# Patient Record
Sex: FEMALE | Race: WHITE | Employment: OTHER | URBAN - METROPOLITAN AREA
[De-identification: names, ages, dates, MRNs, and addresses within clinical notes are randomized per-mention and may not be internally consistent; named-entity substitution may affect disease eponyms.]

---

## 2017-03-01 ENCOUNTER — APPOINTMENT (OUTPATIENT)
Dept: GENERAL RADIOLOGY | Age: 82
DRG: 203 | End: 2017-03-01
Attending: EMERGENCY MEDICINE
Payer: MEDICARE

## 2017-03-01 ENCOUNTER — HOSPITAL ENCOUNTER (INPATIENT)
Age: 82
LOS: 2 days | Discharge: HOME HEALTH | DRG: 203 | End: 2017-03-03
Attending: EMERGENCY MEDICINE | Admitting: INTERNAL MEDICINE
Payer: MEDICARE

## 2017-03-01 DIAGNOSIS — R09.02 HYPOXIA: ICD-10-CM

## 2017-03-01 DIAGNOSIS — J18.9 COMMUNITY ACQUIRED PNEUMONIA: ICD-10-CM

## 2017-03-01 DIAGNOSIS — R06.2 WHEEZING: Primary | ICD-10-CM

## 2017-03-01 DIAGNOSIS — R60.9 EDEMA, UNSPECIFIED TYPE: ICD-10-CM

## 2017-03-01 PROBLEM — J84.112 IDIOPATHIC PULMONARY FIBROSIS (HCC): Status: ACTIVE | Noted: 2017-03-01

## 2017-03-01 PROBLEM — N18.30 CKD (CHRONIC KIDNEY DISEASE) STAGE 3, GFR 30-59 ML/MIN (HCC): Status: ACTIVE | Noted: 2017-03-01

## 2017-03-01 PROBLEM — I10 HTN (HYPERTENSION): Status: ACTIVE | Noted: 2017-03-01

## 2017-03-01 LAB
ANION GAP BLD CALC-SCNC: 10 MMOL/L (ref 5–15)
APTT PPP: 26.9 SEC (ref 22.1–32.5)
BASOPHILS # BLD AUTO: 0 K/UL (ref 0–0.1)
BASOPHILS # BLD: 0 % (ref 0–1)
BNP SERPL-MCNC: 617 PG/ML (ref 0–450)
BUN SERPL-MCNC: 20 MG/DL (ref 6–20)
BUN/CREAT SERPL: 14 (ref 12–20)
CALCIUM SERPL-MCNC: 8.5 MG/DL (ref 8.5–10.1)
CHLORIDE SERPL-SCNC: 99 MMOL/L (ref 97–108)
CK SERPL-CCNC: 41 U/L (ref 26–192)
CO2 SERPL-SCNC: 25 MMOL/L (ref 21–32)
CREAT SERPL-MCNC: 1.41 MG/DL (ref 0.55–1.02)
EOSINOPHIL # BLD: 0.1 K/UL (ref 0–0.4)
EOSINOPHIL NFR BLD: 1 % (ref 0–7)
ERYTHROCYTE [DISTWIDTH] IN BLOOD BY AUTOMATED COUNT: 20.5 % (ref 11.5–14.5)
GLUCOSE SERPL-MCNC: 173 MG/DL (ref 65–100)
HCT VFR BLD AUTO: 33.8 % (ref 35–47)
HGB BLD-MCNC: 10.1 G/DL (ref 11.5–16)
INR PPP: 1.4 (ref 0.9–1.1)
LACTATE SERPL-SCNC: 1.3 MMOL/L (ref 0.4–2)
LYMPHOCYTES # BLD AUTO: 17 % (ref 12–49)
LYMPHOCYTES # BLD: 2.2 K/UL (ref 0.8–3.5)
MCH RBC QN AUTO: 23.9 PG (ref 26–34)
MCHC RBC AUTO-ENTMCNC: 29.9 G/DL (ref 30–36.5)
MCV RBC AUTO: 80.1 FL (ref 80–99)
MONOCYTES # BLD: 0.8 K/UL (ref 0–1)
MONOCYTES NFR BLD AUTO: 6 % (ref 5–13)
NEUTS SEG # BLD: 9.6 K/UL (ref 1.8–8)
NEUTS SEG NFR BLD AUTO: 76 % (ref 32–75)
PLATELET # BLD AUTO: 243 K/UL (ref 150–400)
POTASSIUM SERPL-SCNC: 3.9 MMOL/L (ref 3.5–5.1)
PROTHROMBIN TIME: 14.7 SEC (ref 9–11.1)
RBC # BLD AUTO: 4.22 M/UL (ref 3.8–5.2)
RBC MORPH BLD: ABNORMAL
RBC MORPH BLD: ABNORMAL
SODIUM SERPL-SCNC: 134 MMOL/L (ref 136–145)
THERAPEUTIC RANGE,PTTT: NORMAL SECS (ref 58–77)
TROPONIN I SERPL-MCNC: <0.04 NG/ML
WBC # BLD AUTO: 12.7 K/UL (ref 3.6–11)

## 2017-03-01 PROCEDURE — 74011000250 HC RX REV CODE- 250: Performed by: EMERGENCY MEDICINE

## 2017-03-01 PROCEDURE — 87040 BLOOD CULTURE FOR BACTERIA: CPT | Performed by: EMERGENCY MEDICINE

## 2017-03-01 PROCEDURE — 65270000029 HC RM PRIVATE

## 2017-03-01 PROCEDURE — 93005 ELECTROCARDIOGRAM TRACING: CPT

## 2017-03-01 PROCEDURE — 82550 ASSAY OF CK (CPK): CPT | Performed by: EMERGENCY MEDICINE

## 2017-03-01 PROCEDURE — 96374 THER/PROPH/DIAG INJ IV PUSH: CPT

## 2017-03-01 PROCEDURE — 85730 THROMBOPLASTIN TIME PARTIAL: CPT | Performed by: EMERGENCY MEDICINE

## 2017-03-01 PROCEDURE — 36415 COLL VENOUS BLD VENIPUNCTURE: CPT | Performed by: EMERGENCY MEDICINE

## 2017-03-01 PROCEDURE — 85610 PROTHROMBIN TIME: CPT | Performed by: EMERGENCY MEDICINE

## 2017-03-01 PROCEDURE — 94762 N-INVAS EAR/PLS OXIMTRY CONT: CPT

## 2017-03-01 PROCEDURE — 85025 COMPLETE CBC W/AUTO DIFF WBC: CPT | Performed by: EMERGENCY MEDICINE

## 2017-03-01 PROCEDURE — 80048 BASIC METABOLIC PNL TOTAL CA: CPT | Performed by: EMERGENCY MEDICINE

## 2017-03-01 PROCEDURE — 83605 ASSAY OF LACTIC ACID: CPT | Performed by: EMERGENCY MEDICINE

## 2017-03-01 PROCEDURE — 99285 EMERGENCY DEPT VISIT HI MDM: CPT

## 2017-03-01 PROCEDURE — 77030013140 HC MSK NEB VYRM -A

## 2017-03-01 PROCEDURE — 94640 AIRWAY INHALATION TREATMENT: CPT

## 2017-03-01 PROCEDURE — 84484 ASSAY OF TROPONIN QUANT: CPT | Performed by: EMERGENCY MEDICINE

## 2017-03-01 PROCEDURE — 74011000258 HC RX REV CODE- 258: Performed by: EMERGENCY MEDICINE

## 2017-03-01 PROCEDURE — 74011250636 HC RX REV CODE- 250/636: Performed by: EMERGENCY MEDICINE

## 2017-03-01 PROCEDURE — 96375 TX/PRO/DX INJ NEW DRUG ADDON: CPT

## 2017-03-01 PROCEDURE — 83880 ASSAY OF NATRIURETIC PEPTIDE: CPT | Performed by: EMERGENCY MEDICINE

## 2017-03-01 PROCEDURE — 96361 HYDRATE IV INFUSION ADD-ON: CPT

## 2017-03-01 PROCEDURE — 71010 XR CHEST PORT: CPT

## 2017-03-01 RX ORDER — ONDANSETRON 2 MG/ML
4 INJECTION INTRAMUSCULAR; INTRAVENOUS
Status: COMPLETED | OUTPATIENT
Start: 2017-03-01 | End: 2017-03-01

## 2017-03-01 RX ORDER — SODIUM CHLORIDE 0.9 % (FLUSH) 0.9 %
5-10 SYRINGE (ML) INJECTION EVERY 8 HOURS
Status: DISCONTINUED | OUTPATIENT
Start: 2017-03-01 | End: 2017-03-03 | Stop reason: HOSPADM

## 2017-03-01 RX ORDER — SODIUM CHLORIDE 0.9 % (FLUSH) 0.9 %
5-10 SYRINGE (ML) INJECTION AS NEEDED
Status: DISCONTINUED | OUTPATIENT
Start: 2017-03-01 | End: 2017-03-03 | Stop reason: HOSPADM

## 2017-03-01 RX ORDER — NALOXONE HYDROCHLORIDE 0.4 MG/ML
0.4 INJECTION, SOLUTION INTRAMUSCULAR; INTRAVENOUS; SUBCUTANEOUS AS NEEDED
Status: DISCONTINUED | OUTPATIENT
Start: 2017-03-01 | End: 2017-03-03 | Stop reason: HOSPADM

## 2017-03-01 RX ADMIN — ALBUTEROL SULFATE 1 DOSE: 2.5 SOLUTION RESPIRATORY (INHALATION) at 22:43

## 2017-03-01 RX ADMIN — ONDANSETRON 4 MG: 2 INJECTION INTRAMUSCULAR; INTRAVENOUS at 22:43

## 2017-03-01 RX ADMIN — CEFTRIAXONE SODIUM 1 G: 1 INJECTION, POWDER, FOR SOLUTION INTRAMUSCULAR; INTRAVENOUS at 23:37

## 2017-03-01 RX ADMIN — SODIUM CHLORIDE 1000 ML: 900 INJECTION, SOLUTION INTRAVENOUS at 22:40

## 2017-03-01 NOTE — IP AVS SNAPSHOT
Current Discharge Medication List  
  
Take these medications at their scheduled times Dose & Instructions Dispensing Information Comments Morning Noon Evening Bedtime  
 amLODIPine 5 mg tablet Commonly known as:  Jessika Fraction Your next dose is: Today, Tomorrow Other:  ____________ Dose:  5 mg Take 1 Tab by mouth daily. Quantity:  30 Tab Refills:  0  
     
   
   
   
  
 azithromycin 250 mg tablet Commonly known as:  Concetta Maloney Your next dose is: Today, Tomorrow Other:  ____________ Dose:  500 mg Take 2 Tabs by mouth daily for 3 days. Quantity:  3 Tab Refills:  0  
     
   
   
   
  
 BYSTOLIC 5 mg tablet Generic drug:  nebivolol Your next dose is: Today, Tomorrow Other:  ____________ Dose:  5 mg Take 5 mg by mouth daily. Indications: hypertension Refills:  0  
     
   
   
   
  
 ferrous sulfate 325 mg (65 mg iron) tablet Your next dose is: Today, Tomorrow Other:  ____________ Dose:  325 mg Take 325 mg by mouth Daily (before breakfast). Refills:  0  
     
   
   
   
  
 folic acid 1 mg tablet Commonly known as:  Ann Marie Your next dose is: Today, Tomorrow Other:  ____________ Dose:  1 mg Take 1 Tab by mouth daily. Quantity:  30 Tab Refills:  0  
     
   
   
   
  
 guaiFENesin  mg ER tablet Commonly known as:  Robert & Robert Your next dose is: Today, Tomorrow Other:  ____________ Dose:  600 mg Take 1 Tab by mouth every twelve (12) hours for 5 days. Can use over the counter forms Quantity:  10 Tab Refills:  0  
     
   
   
   
  
 predniSONE 5 mg tablet Commonly known as:  Pee Wallace Your next dose is: Today, Tomorrow Other:  ____________ Dose:  5 mg Take 5 mg by mouth daily. Refills:  0 PREMARIN 0.625 mg tablet Generic drug:  conjugated estrogens Your next dose is: Today, Tomorrow Other:  ____________ Dose:  0.625 mg Take 0.625 mg by mouth daily. Refills:  0 PROTONIX 40 mg tablet Generic drug:  pantoprazole Your next dose is: Today, Tomorrow Other:  ____________ Dose:  40 mg Take 40 mg by mouth daily. Refills:  0 REMICADE 100 mg injection Generic drug:  inFLIXimab Your next dose is: Today, Tomorrow Other:  ____________ Dose:  5 mg/kg 5 mg/kg by IntraVENous route once. Every 6 weeks Refills:  0  
     
   
   
   
  
 warfarin 2.5 mg tablet Commonly known as:  COUMADIN Your next dose is: Today, Tomorrow Other:  ____________ Dose:  5 mg Take 5 mg by mouth daily. Refills:  0 Take these medications as needed Dose & Instructions Dispensing Information Comments Morning Noon Evening Bedtime  
 albuterol-ipratropium 2.5 mg-0.5 mg/3 ml Nebu Commonly known as:  [place] Your next dose is: Today, Tomorrow Other:  ____________ Dose:  3 mL  
3 mL by Nebulization route every six (6) hours as needed. Quantity:  30 Nebule Refills:  0 Nebulizer & Compressor machine Your next dose is: Today, Tomorrow Other:  ____________ Dose:  1 Each  
1 Each by Does Not Apply route three (3) times daily as needed. Quantity:  1 Each Refills:  0 Where to Get Your Medications Information about where to get these medications is not yet available ! Ask your nurse or doctor about these medications  
  albuterol-ipratropium 2.5 mg-0.5 mg/3 ml Nebu  
 azithromycin 250 mg tablet  
 guaiFENesin  mg ER tablet Nebulizer & Compressor machine

## 2017-03-01 NOTE — IP AVS SNAPSHOT
[address] [place] [address] 
[phone] Patient: Obi Craft MRN: [id number] NSK:[id number] You are allergic to the following Allergen Reactions Ciprofloxacin Other (comments) \"Makes her act weird\" per son Recent Documentation Height  
  
  
  
  
  
 1.676 m Unresulted Labs Order Current Status LEGIONELLA PNEUMOPHILA AG, URINE  In process MYCOPLASMA AB, IGG/IGM In process S. PNEUMO AG, URINE/CSF ONLY In process CULTURE, BLOOD, PAIRED Preliminary result Emergency Contacts Name Discharge Info Relation Home Work Mobile Fabián Coburn  Child [2]   [phone] About your hospitalization You were admitted on:  March 1, 2017 You last received care in the:  [facility] 4M POST SURG ORT 1 You were discharged on:  March 3, 2017 Unit phone number:  [phone] Why you were hospitalized Your primary diagnosis was:  Bronchitis Your diagnoses also included:  Cap (Community Acquired Pneumonia), Idiopathic Pulmonary Fibrosis (Hcc), Anemia, Unspecified, Esophageal Reflux, Leukocytosis, Pulmonary Embolism (Hcc), Hx Of Deep Venous Thrombosis, Anemia, Rheumatoid Arthritis (Hcc), Htn (Hypertension), Ckd (Chronic Kidney Disease) Stage 3, Gfr 30-59 Ml/Min, Ipf (Idiopathic Pulmonary Fibrosis) (Hcc), Hypertension, Acid Reflux, Arthritis, Hx Pulmonary Embolism, Acute On Chronic Respiratory Failure With Hypoxia (Hcc) Providers Seen During Your Hospitalizations Provider Role Specialty Primary office phone Eh Miller MD Attending Provider Emergency Medicine [phone] Tianna Boss MD Attending Provider Internal Medicine [phone] Lizz Maria MD Attending Provider Internal Medicine [phone] Jon Doan DO Attending Provider Internal Medicine [phone] Your Primary Care Physician (PCP) Primary Care Physician Office Phone Office Fax Cesar Nieto [phone] Follow-up Information Follow up With Details Comments Contact Info Laurel Wilkinson MD   [address] [place] [address] [address] [facility] 
[phone] Johana Shea MD Schedule an appointment as soon as possible for a visit  [address] [facility] [address] [address] 
[phone] Current Discharge Medication List  
  
START taking these medications Dose & Instructions Dispensing Information Comments Morning Noon Evening Bedtime  
 albuterol-ipratropium 2.5 mg-0.5 mg/3 ml Nebu Commonly known as:  Meron Yañez Your next dose is: Today, Tomorrow Other:  _________ Dose:  3 mL  
3 mL by Nebulization route every six (6) hours as needed. Quantity:  30 Nebule Refills:  0  
     
   
   
   
  
 azithromycin 250 mg tablet Commonly known as:  Kandice Koenig Your next dose is: Today, Tomorrow Other:  _________ Dose:  500 mg Take 2 Tabs by mouth daily for 3 days. Quantity:  3 Tab Refills:  0  
     
   
   
   
  
 guaiFENesin  mg ER tablet Commonly known as:  Robert & Robert Your next dose is: Today, Tomorrow Other:  _________ Dose:  600 mg Take 1 Tab by mouth every twelve (12) hours for 5 days. Can use over the counter forms Quantity:  10 Tab Refills:  0 Nebulizer & Compressor machine Your next dose is: Today, Tomorrow Other:  _________ Dose:  1 Each  
1 Each by Does Not Apply route three (3) times daily as needed. Quantity:  1 Each Refills:  0 CONTINUE these medications which have NOT CHANGED Dose & Instructions Dispensing Information Comments Morning Noon Evening Bedtime  
 amLODIPine 5 mg tablet Commonly known as:  Charles Morgan Your next dose is: Today, Tomorrow Other:  _________ Dose:  5 mg Take 1 Tab by mouth daily. Quantity:  30 Tab Refills:  0  
     
   
   
   
  
 BYSTOLIC 5 mg tablet Generic drug:  nebivolol Your next dose is: Today, Tomorrow Other:  _________ Dose:  5 mg Take 5 mg by mouth daily. Indications: hypertension Refills:  0  
     
   
   
   
  
 ferrous sulfate 325 mg (65 mg iron) tablet Your next dose is: Today, Tomorrow Other:  _________ Dose:  325 mg Take 325 mg by mouth Daily (before breakfast). Refills:  0  
     
   
   
   
  
 folic acid 1 mg tablet Commonly known as:  [employer] Your next dose is: Today, Tomorrow Other:  _________ Dose:  1 mg Take 1 Tab by mouth daily. Quantity:  30 Tab Refills:  0  
     
   
   
   
  
 predniSONE 5 mg tablet Commonly known as:  Rach Brink Your next dose is: Today, Tomorrow Other:  _________ Dose:  5 mg Take 5 mg by mouth daily. Refills:  0 PREMARIN 0.625 mg tablet Generic drug:  conjugated estrogens Your next dose is: Today, Tomorrow Other:  _________ Dose:  0.625 mg Take 0.625 mg by mouth daily. Refills:  0 PROTONIX 40 mg tablet Generic drug:  pantoprazole Your next dose is: Today, Tomorrow Other:  _________ Dose:  40 mg Take 40 mg by mouth daily. Refills:  0 REMICADE 100 mg injection Generic drug:  inFLIXimab Your next dose is: Today, Tomorrow Other:  _________ Dose:  5 mg/kg 5 mg/kg by IntraVENous route once. Every 6 weeks Refills:  0  
     
   
   
   
  
 warfarin 2.5 mg tablet Commonly known as:  COUMADIN Your next dose is: Today, Tomorrow Other:  _________ Dose:  5 mg Take 5 mg by mouth daily. Refills:  0 STOP taking these medications   
 methotrexate 2.5 mg tablet Commonly known as:  Harley Praelizabeth Where to Get Your Medications Information on where to get these meds will be given to you by the nurse or doctor. ! Ask your nurse or doctor about these medications  
  albuterol-ipratropium 2.5 mg-0.5 mg/3 ml Nebu  
 azithromycin 250 mg tablet  
 guaiFENesin  mg ER tablet Nebulizer & Compressor machine Discharge Instructions Patient Discharge Instructions Freddy Wright / [id number] : 1933 Admitted 3/1/2017 Discharged: 3/2/2017 Primary Diagnoses Problem List as of 3/2/2017  Date Reviewed: 3/1/2017 Codes Class Noted - Resolved IPF (idiopathic pulmonary fibrosis) ([facility] [address] Hypertension Acid reflux Arthritis Hx pulmonary embolism * (Principal)Bronchitis CKD (chronic kidney disease) stage 3, GFR 30-59 ml/min Hx of deep venous thrombosis Rheumatoid arthritis ([facility] [address] Leukocytosis Acute on chronic respiratory failure with hypoxia (HCC) Esophageal reflux Anemia, unspecified Take Home Medications · It is important that you take the medication exactly as they are prescribed. · Keep your medication in the bottles provided by the pharmacist and keep a list of the medication names, dosages, and times to be taken in your wallet. · Do not take other medications without consulting your doctor. What to do at [facility] Recommended diet: Regular Diet Recommended activity: Activity as tolerated and PT/OT per Home Health If you experience worse breathing, please follow up with a pulmonologist. 
 
Follow-up with your PCP in a few weeks Interstitial Lung Disease: Care Instructions Your Care Instructions Interstitial lung disease is a long-term (chronic) lung disease. It happens because of damage between the air sacs in the lung. The damage scars the lung and causes breathing problems.  
People with interstitial lung disease get breathless during exercise and may have a dry cough. These problems may get worse slowly or very quickly. Interstitial lung disease can be caused by breathing in dust from asbestos and silica. It also can be caused by infections and some medicines. Sometimes doctors cannot find the cause. You may get medicine to treat the problem. Corticosteroids can sometimes reduce the swelling of lung tissue and prevent more damage. Oxygen treatment may help your condition. Follow-up care is a key part of your treatment and safety. Be sure to make and go to all appointments, and call your doctor if you are having problems. Its also a good idea to know your test results and keep a list of the medicines you take. How can you care for yourself at home? · Do not smoke. Smoking makes interstitial lung disease worse. If you need help quitting, talk to your doctor about stop-smoking programs and medicines. These can increase your chances of quitting for good. · Take your medicines exactly as prescribed. Call your doctor if you have any problems with your medicine. · Get flu and pneumococcal shots. These help prevent lung infection. · Make an exercise plan with help from your doctor or other health professional. Exercise can help you breathe more easily. · Think about joining a support group. This can help you cope with problems caused by interstitial lung disease. When should you call for help? Call your doctor now or seek immediate medical care if: 
· Your shortness of breath gets worse. · You cough up blood. · You have severe chest pain. Watch closely for changes in your health, and be sure to contact your doctor if you have any problems. Where can you learn more? Go to http://luis e-bridget.info/. Enter L709 in the search box to learn more about \"Interstitial Lung Disease: Care Instructions. \" Current as of: May 23, 2016 Content Version: 11.1 © 5207-5780 [employer].  Care instructions adapted under license by [address] (which disclaims liability or warranty for this information). If you have questions about a medical condition or this instruction, always ask your healthcare professional. [address] any warranty or liability for your use of this information. Information obtained by : 
I understand that if any problems occur once I am at home I am to contact my physician. I understand and acknowledge receipt of the instructions indicated above. Physician's or R.N.'s Signature                                                                  Date/Time Patient or Representative Signature                                                          Date/Time Discharge Orders None Swing [employer] Announcement We are excited to announce that we are making your provider's discharge notes available to you in Swing [employer] Swing. You will see these notes when they are completed and signed by the physician that discharged you from your recent hospital stay. If you have any questions or concerns about any information you see in Swing [employer] Swing, please call the Health Information Department where you were seen or reach out to your Primary Care Provider for more information about your plan of care. Introducing [facility]! [facility] introduces Swing [employer] Swing patient portal. Now you can access parts of your medical record, email your doctor's office, and request medication refills online. 1. In your internet browser, go to [email or web address] 2. Click on the First Time User? Click Here link in the Sign In box. You will see the New Member Sign Up page. 3. Enter your Let Access Code exactly as it appears below. You will not need to use this code after youve completed the sign-up process. If you do not sign up before the expiration date, you must request a new code. · Let Access Code: [facility] Expires: 5/30/2017 11:10 PM 
 
4. Enter the last four digits of your Social Security Number (xxxx) and Date of Birth (mm/dd/yyyy) as indicated and click Submit. You will be taken to the next sign-up page. 5. Create a Let ID. This will be your Let login ID and cannot be changed, so think of one that is secure and easy to remember. 6. Create a Let password. You can change your password at any time. 7. Enter your Password Reset Question and Answer. This can be used at a later time if you forget your password. 8. Enter your e-mail address. You will receive e-mail notification when new information is available in [address] 9. Click Sign Up. You can now view and download portions of your medical record. 10. Click the Download Summary menu link to download a portable copy of your medical information. If you have questions, please visit the Frequently Asked Questions section of the Let website. Remember, Let is NOT to be used for urgent needs. For medical emergencies, dial 911. Now available from your iPhone and Android! General Information Please provide this summary of care documentation to your next provider. Patient Signature:  ____________________________________________________________ Date:  ____________________________________________________________  
  
Saleem Rodrigues Provider Signature:  ____________________________________________________________ Date:  ____________________________________________________________

## 2017-03-02 ENCOUNTER — APPOINTMENT (OUTPATIENT)
Dept: CT IMAGING | Age: 82
DRG: 203 | End: 2017-03-02
Attending: INTERNAL MEDICINE
Payer: MEDICARE

## 2017-03-02 PROBLEM — I10 HTN (HYPERTENSION): Status: RESOLVED | Noted: 2017-03-01 | Resolved: 2017-03-02

## 2017-03-02 PROBLEM — J84.112 IDIOPATHIC PULMONARY FIBROSIS (HCC): Status: RESOLVED | Noted: 2017-03-01 | Resolved: 2017-03-02

## 2017-03-02 PROBLEM — J18.9 CAP (COMMUNITY ACQUIRED PNEUMONIA): Status: RESOLVED | Noted: 2017-03-01 | Resolved: 2017-03-02

## 2017-03-02 PROBLEM — J40 BRONCHITIS: Status: ACTIVE | Noted: 2017-03-02

## 2017-03-02 LAB
ALBUMIN SERPL BCP-MCNC: 2.9 G/DL (ref 3.5–5)
ALBUMIN/GLOB SERPL: 0.8 {RATIO} (ref 1.1–2.2)
ALP SERPL-CCNC: 40 U/L (ref 45–117)
ALT SERPL-CCNC: 16 U/L (ref 12–78)
ANION GAP BLD CALC-SCNC: 10 MMOL/L (ref 5–15)
APPEARANCE UR: CLEAR
AST SERPL W P-5'-P-CCNC: 13 U/L (ref 15–37)
ATRIAL RATE: 102 BPM
BACTERIA URNS QL MICRO: NEGATIVE /HPF
BASOPHILS # BLD AUTO: 0 K/UL (ref 0–0.1)
BASOPHILS # BLD: 0 % (ref 0–1)
BILIRUB SERPL-MCNC: 0.4 MG/DL (ref 0.2–1)
BILIRUB UR QL: NEGATIVE
BUN SERPL-MCNC: 16 MG/DL (ref 6–20)
BUN/CREAT SERPL: 12 (ref 12–20)
CALCIUM SERPL-MCNC: 7.9 MG/DL (ref 8.5–10.1)
CALCULATED R AXIS, ECG10: -10 DEGREES
CALCULATED T AXIS, ECG11: 35 DEGREES
CHLORIDE SERPL-SCNC: 104 MMOL/L (ref 97–108)
CO2 SERPL-SCNC: 26 MMOL/L (ref 21–32)
COLOR UR: ABNORMAL
CREAT SERPL-MCNC: 1.3 MG/DL (ref 0.55–1.02)
DIAGNOSIS, 93000: NORMAL
EOSINOPHIL # BLD: 0 K/UL (ref 0–0.4)
EOSINOPHIL NFR BLD: 0 % (ref 0–7)
EPITH CASTS URNS QL MICRO: ABNORMAL /LPF
ERYTHROCYTE [DISTWIDTH] IN BLOOD BY AUTOMATED COUNT: 20.8 % (ref 11.5–14.5)
FLUAV AG NPH QL IA: NEGATIVE
FLUBV AG NOSE QL IA: NEGATIVE
GLOBULIN SER CALC-MCNC: 3.7 G/DL (ref 2–4)
GLUCOSE SERPL-MCNC: 115 MG/DL (ref 65–100)
GLUCOSE UR STRIP.AUTO-MCNC: NEGATIVE MG/DL
HCT VFR BLD AUTO: 30.4 % (ref 35–47)
HGB BLD-MCNC: 8.8 G/DL (ref 11.5–16)
HGB UR QL STRIP: ABNORMAL
HYALINE CASTS URNS QL MICRO: ABNORMAL /LPF (ref 0–5)
INR PPP: 1.4 (ref 0.9–1.1)
KETONES UR QL STRIP.AUTO: NEGATIVE MG/DL
LEUKOCYTE ESTERASE UR QL STRIP.AUTO: NEGATIVE
LYMPHOCYTES # BLD AUTO: 16 % (ref 12–49)
LYMPHOCYTES # BLD: 1.4 K/UL (ref 0.8–3.5)
MAGNESIUM SERPL-MCNC: 1.9 MG/DL (ref 1.6–2.4)
MCH RBC QN AUTO: 23.2 PG (ref 26–34)
MCHC RBC AUTO-ENTMCNC: 28.9 G/DL (ref 30–36.5)
MCV RBC AUTO: 80.2 FL (ref 80–99)
MONOCYTES # BLD: 1 K/UL (ref 0–1)
MONOCYTES NFR BLD AUTO: 11 % (ref 5–13)
NEUTS SEG # BLD: 6.6 K/UL (ref 1.8–8)
NEUTS SEG NFR BLD AUTO: 73 % (ref 32–75)
NITRITE UR QL STRIP.AUTO: NEGATIVE
PH UR STRIP: 5.5 [PH] (ref 5–8)
PLATELET # BLD AUTO: 215 K/UL (ref 150–400)
POTASSIUM SERPL-SCNC: 3.8 MMOL/L (ref 3.5–5.1)
PROT SERPL-MCNC: 6.6 G/DL (ref 6.4–8.2)
PROT UR STRIP-MCNC: NEGATIVE MG/DL
PROTHROMBIN TIME: 14.2 SEC (ref 9–11.1)
Q-T INTERVAL, ECG07: 358 MS
QRS DURATION, ECG06: 76 MS
QTC CALCULATION (BEZET), ECG08: 457 MS
RBC # BLD AUTO: 3.79 M/UL (ref 3.8–5.2)
RBC #/AREA URNS HPF: ABNORMAL /HPF (ref 0–5)
SODIUM SERPL-SCNC: 140 MMOL/L (ref 136–145)
SP GR UR REFRACTOMETRY: 1.01 (ref 1–1.03)
UA: UC IF INDICATED,UAUC: ABNORMAL
UROBILINOGEN UR QL STRIP.AUTO: 0.2 EU/DL (ref 0.2–1)
VENTRICULAR RATE, ECG03: 98 BPM
WBC # BLD AUTO: 9 K/UL (ref 3.6–11)
WBC URNS QL MICRO: ABNORMAL /HPF (ref 0–4)

## 2017-03-02 PROCEDURE — 87899 AGENT NOS ASSAY W/OPTIC: CPT | Performed by: INTERNAL MEDICINE

## 2017-03-02 PROCEDURE — 74011636637 HC RX REV CODE- 636/637: Performed by: INTERNAL MEDICINE

## 2017-03-02 PROCEDURE — 77030011256 HC DRSG MEPILEX <16IN NO BORD MOLN -A

## 2017-03-02 PROCEDURE — 81001 URINALYSIS AUTO W/SCOPE: CPT | Performed by: INTERNAL MEDICINE

## 2017-03-02 PROCEDURE — 87449 NOS EACH ORGANISM AG IA: CPT | Performed by: INTERNAL MEDICINE

## 2017-03-02 PROCEDURE — 87804 INFLUENZA ASSAY W/OPTIC: CPT | Performed by: INTERNAL MEDICINE

## 2017-03-02 PROCEDURE — 94640 AIRWAY INHALATION TREATMENT: CPT

## 2017-03-02 PROCEDURE — 77010033678 HC OXYGEN DAILY

## 2017-03-02 PROCEDURE — 74011250636 HC RX REV CODE- 250/636: Performed by: EMERGENCY MEDICINE

## 2017-03-02 PROCEDURE — 85025 COMPLETE CBC W/AUTO DIFF WBC: CPT | Performed by: INTERNAL MEDICINE

## 2017-03-02 PROCEDURE — 74011000250 HC RX REV CODE- 250: Performed by: INTERNAL MEDICINE

## 2017-03-02 PROCEDURE — 80053 COMPREHEN METABOLIC PANEL: CPT | Performed by: INTERNAL MEDICINE

## 2017-03-02 PROCEDURE — 36415 COLL VENOUS BLD VENIPUNCTURE: CPT | Performed by: INTERNAL MEDICINE

## 2017-03-02 PROCEDURE — 86738 MYCOPLASMA ANTIBODY: CPT | Performed by: INTERNAL MEDICINE

## 2017-03-02 PROCEDURE — 97162 PT EVAL MOD COMPLEX 30 MIN: CPT

## 2017-03-02 PROCEDURE — 83735 ASSAY OF MAGNESIUM: CPT | Performed by: INTERNAL MEDICINE

## 2017-03-02 PROCEDURE — 65270000029 HC RM PRIVATE

## 2017-03-02 PROCEDURE — 85610 PROTHROMBIN TIME: CPT | Performed by: INTERNAL MEDICINE

## 2017-03-02 PROCEDURE — 74011250637 HC RX REV CODE- 250/637: Performed by: INTERNAL MEDICINE

## 2017-03-02 PROCEDURE — 77030012856

## 2017-03-02 PROCEDURE — 71250 CT THORAX DX C-: CPT

## 2017-03-02 PROCEDURE — 97530 THERAPEUTIC ACTIVITIES: CPT

## 2017-03-02 PROCEDURE — 97116 GAIT TRAINING THERAPY: CPT

## 2017-03-02 PROCEDURE — 77030013140 HC MSK NEB VYRM -A

## 2017-03-02 RX ORDER — NEBULIZER AND COMPRESSOR
1 EACH MISCELLANEOUS
Qty: 1 EACH | Refills: 0 | Status: ON HOLD | OUTPATIENT
Start: 2017-03-02 | End: 2017-03-08

## 2017-03-02 RX ORDER — IPRATROPIUM BROMIDE AND ALBUTEROL SULFATE 2.5; .5 MG/3ML; MG/3ML
3 SOLUTION RESPIRATORY (INHALATION)
Status: DISCONTINUED | OUTPATIENT
Start: 2017-03-02 | End: 2017-03-03 | Stop reason: HOSPADM

## 2017-03-02 RX ORDER — TOLTERODINE 2 MG/1
4 CAPSULE, EXTENDED RELEASE ORAL DAILY
Status: DISCONTINUED | OUTPATIENT
Start: 2017-03-02 | End: 2017-03-02

## 2017-03-02 RX ORDER — LANOLIN ALCOHOL/MO/W.PET/CERES
325 CREAM (GRAM) TOPICAL
Status: DISCONTINUED | OUTPATIENT
Start: 2017-03-02 | End: 2017-03-03 | Stop reason: HOSPADM

## 2017-03-02 RX ORDER — AZITHROMYCIN 250 MG/1
500 TABLET, FILM COATED ORAL DAILY
Qty: 3 TAB | Refills: 0 | Status: SHIPPED | OUTPATIENT
Start: 2017-03-02 | End: 2017-03-05

## 2017-03-02 RX ORDER — NEBIVOLOL 5 MG/1
5 TABLET ORAL DAILY
Status: DISCONTINUED | OUTPATIENT
Start: 2017-03-02 | End: 2017-03-03 | Stop reason: HOSPADM

## 2017-03-02 RX ORDER — GUAIFENESIN 600 MG/1
600 TABLET, EXTENDED RELEASE ORAL EVERY 12 HOURS
Status: DISCONTINUED | OUTPATIENT
Start: 2017-03-02 | End: 2017-03-03 | Stop reason: HOSPADM

## 2017-03-02 RX ORDER — GUAIFENESIN 600 MG/1
600 TABLET, EXTENDED RELEASE ORAL EVERY 12 HOURS
Qty: 10 TAB | Refills: 0 | Status: ON HOLD | OUTPATIENT
Start: 2017-03-02 | End: 2017-03-08

## 2017-03-02 RX ORDER — WARFARIN SODIUM 5 MG/1
5 TABLET ORAL EVERY EVENING
Status: COMPLETED | OUTPATIENT
Start: 2017-03-02 | End: 2017-03-02

## 2017-03-02 RX ORDER — IPRATROPIUM BROMIDE AND ALBUTEROL SULFATE 2.5; .5 MG/3ML; MG/3ML
3 SOLUTION RESPIRATORY (INHALATION)
Qty: 30 NEBULE | Refills: 0 | Status: SHIPPED | OUTPATIENT
Start: 2017-03-02

## 2017-03-02 RX ORDER — AZITHROMYCIN 250 MG/1
500 TABLET, FILM COATED ORAL DAILY
Status: DISCONTINUED | OUTPATIENT
Start: 2017-03-02 | End: 2017-03-03 | Stop reason: HOSPADM

## 2017-03-02 RX ORDER — FOLIC ACID 1 MG/1
1 TABLET ORAL DAILY
Status: DISCONTINUED | OUTPATIENT
Start: 2017-03-02 | End: 2017-03-03 | Stop reason: HOSPADM

## 2017-03-02 RX ORDER — AMLODIPINE BESYLATE 5 MG/1
5 TABLET ORAL DAILY
Status: DISCONTINUED | OUTPATIENT
Start: 2017-03-02 | End: 2017-03-03 | Stop reason: HOSPADM

## 2017-03-02 RX ORDER — METHOTREXATE 2.5 MG/1
17.5 TABLET ORAL
COMMUNITY
End: 2017-03-03

## 2017-03-02 RX ORDER — NEBIVOLOL 5 MG/1
10 TABLET ORAL DAILY
Status: DISCONTINUED | OUTPATIENT
Start: 2017-03-02 | End: 2017-03-02

## 2017-03-02 RX ORDER — WARFARIN 2.5 MG/1
5 TABLET ORAL
COMMUNITY

## 2017-03-02 RX ORDER — ALBUTEROL SULFATE 0.83 MG/ML
2.5 SOLUTION RESPIRATORY (INHALATION)
Status: DISCONTINUED | OUTPATIENT
Start: 2017-03-02 | End: 2017-03-03 | Stop reason: HOSPADM

## 2017-03-02 RX ORDER — NEBIVOLOL 5 MG/1
5 TABLET ORAL DAILY
Status: DISCONTINUED | OUTPATIENT
Start: 2017-03-03 | End: 2017-03-02

## 2017-03-02 RX ORDER — PANTOPRAZOLE SODIUM 40 MG/1
40 TABLET, DELAYED RELEASE ORAL
Status: DISCONTINUED | OUTPATIENT
Start: 2017-03-02 | End: 2017-03-03 | Stop reason: HOSPADM

## 2017-03-02 RX ORDER — PREDNISONE 5 MG/1
5 TABLET ORAL DAILY
Status: DISCONTINUED | OUTPATIENT
Start: 2017-03-02 | End: 2017-03-03 | Stop reason: HOSPADM

## 2017-03-02 RX ADMIN — GUAIFENESIN 600 MG: 600 TABLET, EXTENDED RELEASE ORAL at 09:45

## 2017-03-02 RX ADMIN — AMLODIPINE BESYLATE 5 MG: 5 TABLET ORAL at 09:45

## 2017-03-02 RX ADMIN — AZITHROMYCIN 500 MG: 250 TABLET, FILM COATED ORAL at 22:08

## 2017-03-02 RX ADMIN — IPRATROPIUM BROMIDE AND ALBUTEROL SULFATE 3 ML: .5; 2.5 SOLUTION RESPIRATORY (INHALATION) at 13:32

## 2017-03-02 RX ADMIN — GUAIFENESIN 600 MG: 600 TABLET, EXTENDED RELEASE ORAL at 22:07

## 2017-03-02 RX ADMIN — WARFARIN SODIUM 5 MG: 5 TABLET ORAL at 17:10

## 2017-03-02 RX ADMIN — PREDNISONE 5 MG: 5 TABLET ORAL at 09:45

## 2017-03-02 RX ADMIN — Medication 10 ML: at 06:00

## 2017-03-02 RX ADMIN — IPRATROPIUM BROMIDE AND ALBUTEROL SULFATE 3 ML: .5; 2.5 SOLUTION RESPIRATORY (INHALATION) at 20:22

## 2017-03-02 RX ADMIN — IRON 325 MG: 65 TABLET ORAL at 09:45

## 2017-03-02 RX ADMIN — Medication 10 ML: at 22:00

## 2017-03-02 RX ADMIN — PANTOPRAZOLE SODIUM 40 MG: 40 TABLET, DELAYED RELEASE ORAL at 09:45

## 2017-03-02 RX ADMIN — NEBIVOLOL HYDROCHLORIDE 5 MG: 5 TABLET ORAL at 09:45

## 2017-03-02 RX ADMIN — AZITHROMYCIN MONOHYDRATE 500 MG: 500 INJECTION, POWDER, LYOPHILIZED, FOR SOLUTION INTRAVENOUS at 00:11

## 2017-03-02 RX ADMIN — IPRATROPIUM BROMIDE AND ALBUTEROL SULFATE 3 ML: .5; 2.5 SOLUTION RESPIRATORY (INHALATION) at 07:04

## 2017-03-02 RX ADMIN — IPRATROPIUM BROMIDE AND ALBUTEROL SULFATE 3 ML: .5; 2.5 SOLUTION RESPIRATORY (INHALATION) at 02:15

## 2017-03-02 RX ADMIN — Medication 10 ML: at 17:09

## 2017-03-02 RX ADMIN — GUAIFENESIN 600 MG: 600 TABLET, EXTENDED RELEASE ORAL at 00:44

## 2017-03-02 RX ADMIN — FOLIC ACID 1 MG: 1 TABLET ORAL at 09:45

## 2017-03-02 NOTE — CDMP QUERY
Dr. Milana Amaya:    The diagnosis of acute respiratory failure has been documented for your patient. Currently, the documentation does not meet criteria for this diagnosis, and may be challenged by an external reviewer. Please remember to include the clinical indicators to support this diagnosis. Current Documentation: RA sats 91-95%; O2 2L NC, sats 96-98%. RR 18-24. ER MD notes:  \"Effort normal and breath sounds normal. No stridor. No respiratory distress. \"  H/P notes \"No accessory muscle use but with increased WOB. \"  3/2 exam notes \"no acute distress. Krishnan Scott Krishnan Scott No accessory muscle use, clear breath sounds without wheezes rales or rhonchi\"    Criteria:    1. Difficulty breathing: RR >/= 28, air hunger, use of accessory muscles, inability to speak in full sentences, cyanosis    AND    2.  Blood gas abnormality   pH <7.35 or >7.45   pO2 < 60 (or 10mm below COPD pt's baseline)   pCO2 > 50 (or 10mm above COPD pt's baseline)      or       SpO2 < 90%  while on room air   SpO2 < 95% while on supplemental [id number] [facility], [address] [place] [address]  [email or web address]

## 2017-03-02 NOTE — PROGRESS NOTES
[facility]  [address] [facility], [address] [place] [address]  [phone]      Medical Progress Note      NAME: Marlene Tarango   :  1933  MRM:  [id number]    Date/Time: 3/2/2017  9:18 AM       Assessment and Plan:     Bronchitis / Leukocytosis - POA, appears to be better this AM.  Likely viral but complete Azithro course. NOT sepsis. Consult pulmonary to see if any further inpatient workup needed. Getting guaifenesin and duonebs. Acute respiratory failure with hypoxia - Uncertain diagnosis yet. Check 6 minute walk for hypoxia. Unclear if chronic, with fibrosis. Wean oxygen if tolerated. IPF (idiopathic pulmonary fibrosis) - Noted on CT chest.  Patient states she has never been told of this Dx and does not have a pulmonologist.  Could this be rheumatoid? MTX damage? Consult pulmonology. Anemia, unspecified - Worse this AM after hydration. Continue PO iron. Check serologies. Dehydration / CKD (chronic kidney disease) stage 3 - POA, a bit better after hydration. Hx pulmonary embolism / Hx of deep venous thrombosis - On coumadin, but INR only 1.4    Hypertension - Continue norvasc, bystolic    Esophageal reflux - Continue PPI    Rheumatoid arthritis - On low dose prednisone, holding methotrexate       Subjective:     Chief Complaint:  Breathing much better    ROS:  (bold if positive, if negative)    Cough SOB/LAND  Tolerating PT  Tolerating Diet        Objective:     Last 24hrs VS reviewed since prior progress note.  Most recent are:    Visit Vitals    /69 (BP 1 Location: Left arm, BP Patient Position: At rest)    Pulse 88    Temp 98.7 °F (37.1 °C)    Resp 20    Ht 5' 6\" (1.676 m)    Wt 90.7 kg (200 lb)    SpO2 98%    BMI 32.28 kg/m2     SpO2 Readings from Last 6 Encounters:   17 98%   11/18/15 93%   07/31/15 95%   13 99%    O2 Flow Rate (L/min): 2 l/min   No intake or output data in the 24 hours ending 17 0918     Physical Exam:    Gen:  Obese, in no acute distress  HEENT:  Pink conjunctivae, PERRL, hearing intact to voice, moist mucous membranes  Neck:  Supple, without masses, thyroid non-tender  Resp:  No accessory muscle use, clear breath sounds without wheezes rales or rhonchi  Card:  No murmurs, normal S1, S2 without thrills, bruits or peripheral edema  Abd:  Soft, non-tender, non-distended, normoactive bowel sounds are present, no mass  Lymph:  No cervical or inguinal adenopathy  Musc:  No cyanosis or clubbing  Skin:  No rashes or ulcers, skin turgor is good  Neuro:  Cranial nerves are grossly intact, mild motor weakness, follows commands appropriately  Psych:  Good insight, oriented to person, place and time, alert    Telemetry reviewed:   normal sinus rhythm  __________________________________________________________________  Medications Reviewed: (see below)  Medications:     Current Facility-Administered Medications   Medication Dose Route Frequency    cefTRIAXone (ROCEPHIN) 1 g in 0.9% sodium chloride (MBP/ADV) 50 mL  1 g IntraVENous Q24H    azithromycin (ZITHROMAX) tablet 500 mg  500 mg Oral DAILY    guaiFENesin ER (MUCINEX) tablet 600 mg  600 mg Oral Q12H    albuterol-ipratropium (DUO-NEB) 2.5 MG-0.5 MG/3 ML  3 mL Nebulization Q6H RT    albuterol (PROVENTIL VENTOLIN) nebulizer solution 2.5 mg  2.5 mg Nebulization Q4H PRN    amLODIPine (NORVASC) tablet 5 mg  5 mg Oral DAILY    ferrous sulfate tablet 325 mg  325 mg Oral ACB    folic acid (FOLVITE) tablet 1 mg  1 mg Oral DAILY    nebivolol (BYSTOLIC) tablet 10 mg  10 mg Oral DAILY    pantoprazole (PROTONIX) tablet 40 mg  40 mg Oral DAILY    predniSONE (DELTASONE) tablet 5 mg  5 mg Oral DAILY    sodium chloride (NS) flush 5-10 mL  5-10 mL IntraVENous Q8H    sodium chloride (NS) flush 5-10 mL  5-10 mL IntraVENous PRN    naloxone (NARCAN) injection 0.4 mg  0.4 mg IntraVENous PRN    Warfarin: pharmacy dosing daily   Other Rx Dosing/Monitoring        Lab Data Reviewed: (see below)  Lab Review:     Recent Labs      03/02/17 0511 03/01/17 2242   WBC  9.0  12.7*   HGB  8.8*  10.1*   HCT  30.4*  33.8*   PLT  215  243     Recent Labs      03/02/17 0511 03/01/17 2242   NA  140  134*   K  3.8  3.9   CL  104  99   CO2  26  25   GLU  115*  173*   BUN  16  20   CREA  1.30*  1.41*   CA  7.9*  8.5   MG  1.9   --    ALB  2.9*   --    TBILI  0.4   --    SGOT  13*   --    ALT  16   --    INR   --   1.4*     No results found for: GLUCPOC  No results for input(s): PH, PCO2, PO2, HCO3, FIO2 in the last 72 hours. Recent Labs      03/01/17 2242   INR  1.4*     All Micro Results     Procedure Component Value Units Date/Time    RESPIRATORY VIRAL PANEL, PCR [[id number]]     Order Status:  Sent Specimen:  Sputum     MYCOPLASMA AB, IGG/IGM [[id number]] Collected:  03/02/17 0511    Order Status:  Completed Specimen:  Serum Updated:  03/02/17 0547    INFLUENZA A & B AG (RAPID TEST) [[id number]] Collected:  03/02/17 0010    Order Status:  Completed Specimen:  Nasopharyngeal from Nasal washing Updated:  03/02/17 0105     Influenza A Antigen NEGATIVE         Influenza B Antigen NEGATIVE        CULTURE, RESPIRATORY/SPUTUM/BRONCH Minnette Hoe STAIN [[id number]]     Order Status:  Sent Specimen:  Sputum     LEGIONELLA PNEUMOPHILA AG, URINE  [[id number]]     Order Status:  Sent Specimen:  Urine from Urine     S. Corena Countess ONLY [[id number]]     Order Status:  Sent Specimen:  Other     CULTURE, BLOOD, PAIRED [[id number]] Collected:  03/01/17 2245    Order Status:  Completed Specimen:  Blood Updated:  03/01/17 2250          I have reviewed notes of prior 24hr.     Other pertinent lab: none    Total time spent with patient: [address] discussed with: Patient, Care Manager, Nursing Staff and >50% of time spent in counseling and coordination of care    Discussed:  Care Plan    Prophylaxis:  H2B/PPI    Disposition:  Home w/Family           ___________________________________________________    Attending Physician: Ana Benitez MD

## 2017-03-02 NOTE — CONSULTS
Name: [facility]: [facility]   : 1933 Admit Date: 3/1/2017   Phone: [phone]  Room: [facility]/01   PCP: Tylor Porras MD  MRN: [id number]   Date: 3/2/2017  Code: DNR        HPI:    Chart and notes reviewed. Data reviewed. I review the patient's current medications in the medical record at each encounter. I have evaluated and examined the patient. 9:53 AM       History was obtained from patient. I was asked by Airam Sanchez MD to see Nandiniadria Walker in consultation for a chief complaint of ILD. Ms. Brittany Quniteros is a pleasant 80year old female who presented to the [facility] ED last night with worsening SOB and weakness. Her symptoms began several days ago, but became much worse yesterday and she had a hard time getting out of bed. She denies fever or chills. Denies CP. Reports cough with white sputum. Denies abd pain/N/V. Denies LE pain/swelling. Patient reports history of ILD. States it was diagnosed ~ 4 years ago in [place]. She does not recall the name of her pulmonologist.  She denies having lung biopsy. She denies use of home O2, inhalers, or nebs. She is a never smoker and denies second hand smoke exposure. 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 She has history of rheumatoid arthritis for which she takes methotrexate, Remicade, and prednisone. She was evaluated by Dr. Edmund Nunn from our group in  and it was recommended that she stop methotrexate at that time. Chest CT is personally visualized. There are subpleural reticular opacities with a basilar predominance, not significantly changed since .     WBC 9.0  Hgb 8.8  Creat 1.30 - has CKD at baseline  Lactic acid 1.3  proBNP 617  Trop negative  Rapid influenza negative  INR 1.4    Past Medical History:   Diagnosis Date    Acid reflux     Arthritis     Hx pulmonary embolism     Hypertension     IPF (idiopathic pulmonary fibrosis) ([facility] [address]        Past Surgical History:   Procedure Laterality Date    HX HYSTERECTOMY      HX KNEE REPLACEMENT      right    HX LUMBAR FUSION      HX TONSILLECTOMY         No family history on file. Social History   Substance Use Topics    Smoking status: Never Smoker    Smokeless tobacco: Never Used    Alcohol use No       Allergies   Allergen Reactions    Ciprofloxacin Other (comments)     \"Makes her act weird\" per son       Current Facility-Administered Medications   Medication Dose Route Frequency    cefTRIAXone (ROCEPHIN) 1 g in 0.9% sodium chloride (MBP/ADV) 50 mL  1 g IntraVENous Q24H    azithromycin (ZITHROMAX) tablet 500 mg  500 mg Oral DAILY    guaiFENesin ER (MUCINEX) tablet 600 mg  600 mg Oral Q12H    albuterol-ipratropium (DUO-NEB) 2.5 MG-0.5 MG/3 ML  3 mL Nebulization Q6H RT    albuterol (PROVENTIL VENTOLIN) nebulizer solution 2.5 mg  2.5 mg Nebulization Q4H PRN    amLODIPine (NORVASC) tablet 5 mg  5 mg Oral DAILY    ferrous sulfate tablet 325 mg  325 mg Oral ACB    folic acid (FOLVITE) tablet 1 mg  1 mg Oral DAILY    pantoprazole (PROTONIX) tablet 40 mg  40 mg Oral ACB    predniSONE (DELTASONE) tablet 5 mg  5 mg Oral DAILY    nebivolol (BYSTOLIC) tablet 5 mg  5 mg Oral DAILY    sodium chloride (NS) flush 5-10 mL  5-10 mL IntraVENous Q8H    sodium chloride (NS) flush 5-10 mL  5-10 mL IntraVENous PRN    naloxone (NARCAN) injection 0.4 mg  0.4 mg IntraVENous PRN    Warfarin: pharmacy dosing daily   Other Rx Dosing/Monitoring         REVIEW OF SYSTEMS   Negative except as stated in the HPI. Physical Exam:   Visit Vitals    /69 (BP 1 Location: Left arm, BP Patient Position: At rest)    Pulse 88    Temp 98.7 °F (37.1 °C)    Resp 20    Ht 5' 6\" (1.676 m)    Wt 90.7 kg (200 lb)    SpO2 98%    BMI 32.28 kg/m2       General:  Alert, cooperative, no distress, appears stated age. Head:  Normocephalic, without obvious abnormality, atraumatic. Eyes:  Conjunctivae/corneas clear. Nose: Nares normal. Septum midline. Mucosa normal.    Throat: Lips, mucosa, and tongue normal.    Neck: Supple, symmetrical, trachea midline, no adenopathy. Lungs:   Fine inspiratory crackles in the bases bilaterally. Chest wall:  No tenderness or deformity. Heart:  Regular rate and rhythm, S1, S2 normal, no murmur, click, rub or gallop. Abdomen:   Soft, non-tender. Bowel sounds normal. No masses,  No organomegaly. Extremities: Extremities normal, atraumatic, no cyanosis or edema. Venous stasis changes and varicosities noted. Pulses: 2+ and symmetric all extremities. Skin: Skin color, texture, turgor normal. No rashes or lesions   Lymph nodes: Cervical, supraclavicular nodes normal.   Neurologic: Grossly nonfocal       Lab Results   Component Value Date/Time    Sodium 140 03/02/2017 05:11 AM    Potassium 3.8 03/02/2017 05:11 AM    Chloride 104 03/02/2017 05:11 AM    CO2 26 03/02/2017 05:11 AM    BUN 16 03/02/2017 05:11 AM    Creatinine 1.30 03/02/2017 05:11 AM    Glucose 115 03/02/2017 05:11 AM    Calcium 7.9 03/02/2017 05:11 AM    Magnesium 1.9 03/02/2017 05:11 AM    Phosphorus 3.0 07/31/2015 04:05 AM    Lactic acid 1.3 03/01/2017 10:45 PM       Lab Results   Component Value Date/Time    WBC 9.0 03/02/2017 05:11 AM    HGB 8.8 03/02/2017 05:11 AM    PLATELET 673 45/50/0023 05:11 AM    MCV 80.2 03/02/2017 05:11 AM       Lab Results   Component Value Date/Time    INR 1.4 03/01/2017 10:42 PM    aPTT 26.9 03/01/2017 10:42 PM    AST (SGOT) 13 03/02/2017 05:11 AM    Alk.  phosphatase 40 03/02/2017 05:11 AM    Protein, total 6.6 03/02/2017 05:11 AM    Albumin 2.9 03/02/2017 05:11 AM    Globulin 3.7 03/02/2017 05:11 AM       Lab Results   Component Value Date/Time    Iron 144 07/29/2015 02:40 AM    TIBC 307 07/29/2015 02:40 AM    Iron % saturation 47 07/29/2015 02:40 AM    Ferritin 232 07/29/2015 02:40 AM       Lab Results   Component Value Date/Time    Sed rate (ESR) 80 05/20/2013 05:00 AM        No results found for: PH, PHI, PCO2, PCO2I, PO2, PO2I, HCO3, HCO3I, FIO2, FIO2I    Lab Results   Component Value Date/Time    CK 57 11/18/2015 05:10 PM    CK-MB Index 2.6 11/18/2015 05:10 PM    Troponin-I, Qt. <0.04 03/01/2017 10:42 PM        Lab Results   Component Value Date/Time    Culture result: NO SIGNIFICANT GROWTH 11/18/2015 07:00 PM       No results found for: TOXA1, RPR, HBCM, HBSAG, HAAB, HCAB, HCAB1, HAAT, G6PD, CRYAC, HIVGT, HIVR, HIV1, HIV12, HIVPC, HIVRPI    Lab Results   Component Value Date/Time    CK 57 11/18/2015 05:10 PM    CK 46 05/18/2013 12:50 PM       Lab Results   Component Value Date/Time    Color YELLOW/STRAW 11/18/2015 07:00 PM    Appearance CLOUDY 11/18/2015 07:00 PM    Specific gravity 1.020 05/18/2013 02:10 PM    pH (UA) 5.5 11/18/2015 07:00 PM    Protein 100 11/18/2015 07:00 PM    Glucose NEGATIVE  11/18/2015 07:00 PM    Ketone NEGATIVE  11/18/2015 07:00 PM    Bilirubin NEGATIVE  11/18/2015 07:00 PM    Blood LARGE 11/18/2015 07:00 PM    Urobilinogen 0.2 11/18/2015 07:00 PM    Nitrites NEGATIVE  11/18/2015 07:00 PM    Leukocyte Esterase SMALL 11/18/2015 07:00 PM    WBC  11/18/2015 07:00 PM    RBC  11/18/2015 07:00 PM    Bacteria NEGATIVE  11/18/2015 07:00 PM       IMPRESSION  · Acute bronchitis: likely viral  · ILD: stable; may be secondary to RA  · Anemia with history of iron deficiency  · Hx of PE/DVT with subtherapeutic INR  · RA  · GERD  · HTN    PLAN  · Check 6 MWT; not on home O2 at baseline. No hypoxia documented at admission  · Check sputum sample and viral panel  · Zithromax for 5 day course; can stop CTX  · Mucinex  · Duonebs  · Patient has been advised to stop methotrexate in the past as it may be contributing to her ILD. She has elected to continue taking it. Her ILD appears stable at this time. · Patient would benefit from outpatient pulmonary follow up with complete PFTs. · GI prophylaxis: Protonix  · DVT prophylaxis: Coumadin; pharmacy to dose.   INR not therapeutic currently        Thank you for allowing us to participate in the care of this patient. Patient is stable from a pulmonary standpoint. We will sign off and arrange for outpatient follow up in 2-4 weeks. Please call with questions.     Nithin Birmingham

## 2017-03-02 NOTE — ROUTINE PROCESS
Primary Nurse Otilia Langston RN performed a dual skin assessment on this patient Impairment noted- see wound doc flow sheet  Fermin score is 19

## 2017-03-02 NOTE — H&P
[place] [address]  [phone]    Hospitalist Admission Note      NAME:  Andrew Marcos   :   1933   MRN:  [id number]     PCP:  Blaine Pallas, MD     Date/Time:  3/1/2017 11:50 PM        Subjective:     CHIEF COMPLAINT: coughing, dyspnea    HISTORY OF PRESENT ILLNESS:     Ms. Aida Gutiérrez is a 80 y.o. female w/ hx of idiopathic pulmonary fibrosis, HTN, PE/DVT presents with cough. Started 3 days ago, intermittent, worsening, moderate in severity, associated with nonproductive cough. No fevers. Son with whom the patient lives also noted confusion this morning. ED workup showed SIRS criteria including leukocytosis. CXR showed interstitial edema and bibasilar atelectasis. Past Medical History:   Diagnosis Date    Acid reflux     Arthritis     COPD     Interstitial Lung Disease    Embolism (HCC)     Hypertension         Past Surgical History:   Procedure Laterality Date    HX HYSTERECTOMY      HX KNEE REPLACEMENT      right    HX LUMBAR FUSION      HX TONSILLECTOMY         Social History   Substance Use Topics    Smoking status: Never Smoker    Smokeless tobacco: Never Used    Alcohol use No         Family History: + for HTN      Allergies   Allergen Reactions    Ciprofloxacin Other (comments)     \"Makes her act weird\" per son        Prior to Admission medications    Medication Sig Start Date End Date Taking? Authorizing Provider   folic acid (FOLVITE) 1 mg tablet Take 1 Tab by mouth daily. 7/31/15  Yes Asha Morales MD   warfarin (COUMADIN) 2.5 mg tablet Take 2 Tabs by mouth daily. Follow up with PCP within 3 days for INR check. Titrate dose as needed 7/31/15  Yes Asha Morales MD   ferrous sulfate 325 mg (65 mg iron) tablet Take 325 mg by mouth Daily (before breakfast). Yes Historical Provider   pantoprazole (PROTONIX) 40 mg tablet Take 40 mg by mouth daily.    Yes Historical Provider   nebivolol (BYSTOLIC) 5 mg tablet Take 10 mg by mouth daily. Indications: HYPERTENSION   Yes Phys MD Nicolás   predniSONE (DELTASONE) 5 mg tablet Take 5 mg by mouth daily. Yes Phys MD Nicolás   oxyCODONE-acetaminophen (PERCOCET) 5-325 mg per tablet Take 1-2 Tabs by mouth every four (4) hours as needed for Pain (take with food). Max Daily Amount: 12 Tabs. 11/18/15   Faizan Crane PA-C   amLODIPine (NORVASC) 5 mg tablet Take 1 Tab by mouth daily. 7/31/15   Marilyn Lozano MD   tolterodine ER (DETROL LA) 4 mg ER capsule Take 4 mg by mouth daily. Phys MD Nicolás   inFLIXimab (REMICADE) 100 mg injection 5 mg/kg by IntraVENous route once. Every 6 weeks    John Monzon MD       Review of Systems:  (bold if positive, if negative)    Gen:  Eyes:  ENT:  CVS:  edemaPulm:  Cough, dyspneawheezingGI:  nausea, emesis,  :    MS:  Skin:  Psych:  Endo:    Hem:  Renal:    Neuro:            Objective:      VITALS:    Vital signs reviewed; most recent are:    Visit Vitals    /65    Pulse (!) 103    Temp 98.1 °F (36.7 °C)    Resp 22    Ht 5' 6\" (1.676 m)    Wt 90.7 kg (200 lb)    SpO2 96%    BMI 32.28 kg/m2     SpO2 Readings from Last 6 Encounters:   03/02/17 96%   11/18/15 93%   07/31/15 95%   05/21/13 99%        No intake or output data in the 24 hours ending 03/02/17 0056         Exam:     Physical Exam:    Gen:  Well-developed, well-nourished, in minimal distress  HEENT:  No scleral icterus, hearing intact to voice, moist mucous membranes  Neck:  Supple, without masses. Thyroid non-tender  Resp:  No accessory muscle use but with increased WOB. Mildly diminished with exp wheezing and rales. Card: tachycardic, reg rhythm. Normal S1 and S2 without murmurs, rubs, or gallops. 1+ peripheral lower extremity edema. No JVD. Abd:  Normoactive bowel sounds. Soft, non-tender, non-distended. No rebound, no guarding.   Musc:  No cyanosis or clubbing  Skin:  No rashes or ulcers; turgor intact   Neuro:  Cranial nerves are grossly intact, no focal motor weakness, follows commands appropriately  Psych:  Good insight, normal affect. Alert, oriented x 3. Answers questions appropriately       Labs:    Recent Labs      03/01/17   2242   WBC  12.7*   HGB  10.1*   HCT  33.8*   PLT  243     Recent Labs      03/01/17 2242   NA  134*   K  3.9   CL  99   CO2  25   GLU  173*   BUN  20   CREA  1.41*   CA  8.5     No components found for: GLPOC  No results for input(s): PH, PCO2, PO2, HCO3, FIO2 in the last 72 hours. Recent Labs      03/01/17   2242   INR  1.4*     No results found for: SDES  Lab Results   Component Value Date/Time    Culture result: NO SIGNIFICANT GROWTH 11/18/2015 07:00 PM     All other current labs reviewed in the computer. Imaging/Studies:    CXR:  interstitial edema and bibasilar atelectasis. Imaging personally reviewed    EKG: poor quality, but probably sinus tach. Q waves inf leads  EKG personally reviewed         Assessment / Plan:       80 y.o. female with hx of  idiopathic pulmonary fibrosis, HTN, PE/DVT presents with cough and dyspnea, admitted for presumed PNA      Cough / Dyspnea: CXR equivocal. DDx include PNA vs pulmonary edema vs worsening pulmonary fibrosis.  Only mildly elevated proBN however in the setting of obesity  -- agree with empiric abx, will continue CTX and azithromycin  -- add scheduled nebs  -- chest CT   -- Send sputum sample, PNA workup (Legionella and Strep Pneumo urine ag, Mycoplasma IgM)  -- may benefit from gentle diuresis pending CT and evolving clinical status      HTN (hypertension) (3/1/2017)  -- cont Norvasc and nebivolol      CKD (chronic kidney disease) stage 3, GFR 30-59 ml/min (3/1/2017):   -- monitor Cr     DVT (deep venous thrombosis) / Pulmonary embolism was presumed from VQ scan from July 2015  -- cont warfarin, follow INR closely on concomitant azithromycin (severe drug interaction)      Esophageal reflux (5/18/2013)  -- cont PPI      Anemia, unspecified (5/18/2013)  -- monitor Hg, transfuse PRN      Leukocytosis (5/21/2013) / SIRS: on chronic prednisone, likely reactive. No sepsis.  Lactate WNR  -- monitor on IV abx      Rheumatoid arthritis ([address] (7/26/2015)  -- outpatient follow up       Code Status: DNR, d/w pt and son     Surrogate decision maker: son       Previous notes and lab results reviewed, including d/c summary, prior CT chest reports      Total time spent with patient: 79 Minutes     Risk of deterioration: [address] discussed with: ED provider, Patient, Nursing Staff and >50% of time spent in counseling and coordination of care    Discussed:  Code Status, Care Plan and D/C Planning    Prophylaxis:  Coumadin    Disposition:  Home w/Family       ___________________________________________________    Attending Physician: Jani Martin MD

## 2017-03-02 NOTE — PROGRESS NOTES
RT unable to do 6 Minute Walk Test with patient due to her fall risk level. Please consult Physical Therapy. Thank you.

## 2017-03-02 NOTE — WOUND CARE
Wound Care Consult:  Initial wound and skin care consult by [facility]. Patient is alert and oriented, resting in bed. Son at bedside. Patient stated she is wet with urine because she had just awoke from a nap. Mobility team member at bedside to assist with incontinence care. Per patients son she sits at home in recliner mostly and does wear an adult brief for incontinence of urine. Assessment:  All skin folds and bony prominences assessed. Bilateral heels are dry and intact, no redness noted. Floated on pillow upon initial assessment. Gluteal cleft with MASD superficial partial thickness skin loss, pink and macerated wound base ~ 0.4 cm x 0.2 cm x 0.1 cm. Kaylin wound redness noted but blanchable. Bilateral buttock with redness all areas are blanchable and intact. Patient skin is dry and flaky. No further skin breakdown noted. Treatment:  Patient cleaned for incontinence of urine and bed pad changed. Lotion applied to dry skin and buttock  Purwick applied for incontinence care  Patient turned on side and pillows to offload bony prominences and feet. Waffle cushion for offloading while sitting up in chair. Recommendations/Plan:  Encourage patient to turn Q 2 hrs as tolerated and prn  Encourage patient to shift Q 30 min while up in chair. Please re consult as needed.    Wade Ambrocio RN

## 2017-03-02 NOTE — ED PROVIDER NOTES
HPI Comments: 80 y.o. female with past medical history significant for HTN, Embolism, Hysterectomy who presents from home via EMS with chief complaint of cough. Pt reports 1 week hx of worsening cough accompanied by mild SOB. Pt notes tonight, nausea with vomiting (\"3x\"). EMS reports pt was \"90%\" room air. She was given a nebulizer treatment and Zofran. Pt reports the regular use of Coumadin. NKDA. No known fever. There are no other acute medical concerns at this time. Old chart review: Pt admitted 7/26/15-7/31/15 for suspected pulmonary embolism and BLE DVT. Pt discharged home on Coumadin, Norvasc, Lovenox and Folic acid. PCP: Harriet Pace MD    Note written by Andrew Hubbard, as dictated by Riddhi New MD 10:27 PM    The history is provided by the patient and the EMS personnel. No  was used. Past Medical History:   Diagnosis Date    Acid reflux     Arthritis     COPD     Interstitial Lung Disease    Embolism (HCC)     Hypertension        Past Surgical History:   Procedure Laterality Date    HX HYSTERECTOMY      HX KNEE REPLACEMENT      right    HX LUMBAR FUSION      HX TONSILLECTOMY           No family history on file. Social History     Social History    Marital status:      Spouse name: N/A    Number of children: N/A    Years of education: N/A     Occupational History    Not on file. Social History Main Topics    Smoking status: Never Smoker    Smokeless tobacco: Never Used    Alcohol use No    Drug use: No    Sexual activity: Not on file     Other Topics Concern    Not on file     Social History Narrative         ALLERGIES: Ciprofloxacin    Review of Systems   Constitutional: Negative for fever. HENT: Negative for facial swelling and nosebleeds. Eyes: Negative for pain. Respiratory: Positive for cough and shortness of breath. Negative for chest tightness. Cardiovascular: Negative for chest pain and leg swelling. Gastrointestinal: Positive for nausea and vomiting. Negative for abdominal pain and diarrhea. Endocrine: Negative for polyuria. Genitourinary: Negative for difficulty urinating and flank pain. Musculoskeletal: Negative for arthralgias and back pain. Skin: Negative for color change. Allergic/Immunologic: Negative for immunocompromised state. Neurological: Negative for dizziness and headaches. Hematological: Does not bruise/bleed easily. Psychiatric/Behavioral: Negative for agitation. All other systems reviewed and are negative. There were no vitals filed for this visit. Physical Exam   Constitutional: She is oriented to person, place, and time. She appears well-developed and well-nourished. Frail and elderly appearing   HENT:   Head: Normocephalic and atraumatic. Right Ear: External ear normal.   Left Ear: External ear normal.   Nose: Nose normal.   Mouth/Throat: Oropharynx is clear and moist.   Eyes: EOM are normal. Pupils are equal, round, and reactive to light. No scleral icterus. Neck: Normal range of motion. Neck supple. No JVD present. No tracheal deviation present. No thyromegaly present. Cardiovascular: Normal rate, regular rhythm, normal heart sounds and intact distal pulses. Exam reveals no friction rub. No murmur heard. Pulmonary/Chest: Effort normal and breath sounds normal. No stridor. No respiratory distress. She has no rhonchi. She has no rales. She exhibits no tenderness. Expiratory wheezes b/l (right > left)  Crackles at the bases  O2 sats 90% on room air, 94% on 2L. Abdominal: Soft. Bowel sounds are normal. She exhibits no distension. There is no tenderness. There is no rebound and no guarding. Musculoskeletal: Normal range of motion. She exhibits no edema or tenderness. Lymphadenopathy:     She has no cervical adenopathy. Neurological: She is alert and oriented to person, place, and time. She has normal reflexes. No cranial nerve deficit. Coordination normal.   Good pulses. Skin: Skin is warm and dry. No rash noted. No erythema. Psychiatric: She has a normal mood and affect. Her behavior is normal. Judgment and thought content normal.   Nursing note and vitals reviewed. Note written by Andrew Dominguez, as dictated by Holly Marquez MD 10:31 PM    MDM  Number of Diagnoses or Management Options  Community acquired pneumonia:   Edema, unspecified type:   Hypoxia:   Wheezing:   Diagnosis management comments: 59-year-old white female presents to the emergency department with cough, vomiting. Patient reports she's had a cough for about a week. She feels congested. She has wheezing in the right lung. Check x-rays to evaluate for pneumonia. We'll also check basic blood work. We'll give albuterol nebulizer treatment. We'll reassess when testing his back. Patient agrees with this plan. Amount and/or Complexity of Data Reviewed  Clinical lab tests: ordered and reviewed  Tests in the radiology section of CPT®: ordered and reviewed  Tests in the medicine section of CPT®: ordered and reviewed  Decide to obtain previous medical records or to obtain history from someone other than the patient: yes  Obtain history from someone other than the patient: yes  Review and summarize past medical records: yes  Independent visualization of images, tracings, or specimens: yes    Risk of Complications, Morbidity, and/or Mortality  Presenting problems: high  Diagnostic procedures: high  Management options: high    Critical Care  Total time providing critical care: 30-74 minutes (Total critical care time spend exclusive of procedures:  30 min)    Patient Progress  Patient progress: improved    ED Course       Procedures     ED EKG interpretation:  Rhythm: atrial fib; and irregular. Rate (approx.): 98; Axis: normal; No ST elevations or depressions.   Note written by Andrew Dominguez, as dictated by Holly Marquez MD 10:34 PM    PROGRESS NOTE:  11:26 PM  Chest XR showed pneumonia with interstitial edema and CHF. WBC slightly elevated. Will treat her for PNA, pulmonary edema and CHF. . Will admit for further evaluation. CONSULT NOTE:  11:29 PM Vito Riddle MD spoke with Dr. Jemima Ferreira, Consult for Hospitalist.  Discussed available diagnostic tests and clinical findings. Dr. Jemima Ferreira will see and admit patient.     PROGRESS NOTE:  11:32 PM  Updated patient on plan and family also updated    Pt on nasal O2 for hypoxia    11:37 PM  On reeval, she's feeling better after neb treatment

## 2017-03-02 NOTE — ROUTINE PROCESS
TRANSFER - IN REPORT:    Verbal report received from Ezzie Bamberger, [facility] (name) on Ramesh Briggs  being received from ED (unit) for routine progression of care      Report consisted of patients Situation, Background, Assessment and   Recommendations(SBAR). Information from the following report(s) SBAR, Kardex, ED Summary, Intake/Output, MAR, Accordion, Recent Results and Med Rec Status was reviewed with the receiving nurse. Opportunity for questions and clarification was provided. Assessment completed upon patients arrival to unit and care assumed.

## 2017-03-02 NOTE — PROGRESS NOTES
3/2/2017 5:32 PM Discussed HH with pt, choice given, pt selected [address]. Referral sent to [address] via [employer]. 3/2/2017 5:18 PM O2 order sent to [facility] via [employer]. Pt's O2 will not be able to be delivered to [facility] until 3/3. Pt cannot discharge today, pt, pt's attending and RN are aware. 3/2/2017  3:58 PM Signed nebulizer face to face sent to [facility]. Pt's nebulizer will be delivered to [facility] this evening. Awaiting 6 MWT results. 3/2/2017 2:02 PM Nebulizer script faxed to [facility] due to Medicare competitive bidding to [phone]. Spoke with rep at [facility] pt's nebulizer will not be able to be delivered to [facility] until after 5PM today. 3/2/2017  8:24 AM Met with pt. Charted address and phone numbers confirmed. Pt lives with her son in [place]. There are 3 steps to enter and there is a chair lift to the 2nd floor where pt's bedroom is located. Pt has had HH in the past but this agency was located in [place]. Pt owns a RW and wheelchair. Pt has an aide come in 2 days a week to assist her with bathing and light housekeeping. Pt has rx coverage. Pt does not drive, pt's son or hairdresser transports her to \[facility]". Pt's son will transport pt home. No discharge needs identified at this time. CM will follow. VALERIY Shankar  Care Management Interventions  PCP Verified by CM: Yes (Joe Pond )  Mode of Transport at Discharge:  Other (see comment) (Pt's son )  Current Support Network: Relative's Home, Other  Confirm Follow Up Transport: Family

## 2017-03-02 NOTE — PROGRESS NOTES
Spiritual Care Partner Volunteer visited patient in 407 on 3.2. 17.   Documented by:    [id number] Desi Fontana M.Div M.S, Vic Gutierres5 available at [phone])

## 2017-03-02 NOTE — PROGRESS NOTES
[facility]: MED RECONCILIATION    Comments/Recommendations:   Patient was alert and oriented  Allergies were verified with patient  Patient not great historian, but when prompted with medication name, patient could identify dose and frequency  Patient takes 7 pills of the methotrexate 2.5mg every Thursday  Patient's son is a pharmacist and manages patient's medications  Counseled patient on warfarin (INR goal, consistent diet, drug interactions)    Medications added:     · Premarin 0.625mg  · Methotrexate 2.5mg tablets    Medications removed:    · Oxycodone-APAP 5/325mg tablet (1-2 tablets every 4 hours as needed for pain)  · Tolterodine ER 4mg capsule (once daily)    Medications adjusted:    · Nebivolol changed from taking 2 tablets to 1 tablet daily    Information obtained from: patient, Rx Query    Significant PMH/Disease States:   Past Medical History:   Diagnosis Date    Acid reflux     Arthritis     Hx pulmonary embolism     Hypertension     IPF (idiopathic pulmonary fibrosis) ([facility]      Chief Complaint for this Admission:   Chief Complaint   Patient presents with    Nausea    Shortness of Breath     Allergies: Ciprofloxacin    Prior to Admission Medications:   Prior to Admission Medications   Prescriptions Last Dose Informant Patient Reported? Taking? amLODIPine (NORVASC) 5 mg tablet 3/1/2017 at am Self No Yes   Sig: Take 1 Tab by mouth daily. conjugated estrogens (PREMARIN) 0.625 mg tablet 3/1/2017 at am Self Yes Yes   Sig: Take 0.625 mg by mouth daily. ferrous sulfate 325 mg (65 mg iron) tablet 3/1/2017 at am Self Yes Yes   Sig: Take 325 mg by mouth Daily (before breakfast). folic acid (FOLVITE) 1 mg tablet 3/1/2017 at am Self No Yes   Sig: Take 1 Tab by mouth daily. inFLIXimab (REMICADE) 100 mg injection 2017 Self Yes Yes   Si mg/kg by IntraVENous route once. Every 6 weeks   methotrexate (RHEUMATREX) 2.5 mg tablet 2017 Self Yes Yes   Sig: Take 17.5 mg by mouth Every Thursday. nebivolol (BYSTOLIC) 5 mg tablet 9/1/6607 at am Self Yes Yes   Sig: Take 5 mg by mouth daily. Indications: hypertension   pantoprazole (PROTONIX) 40 mg tablet 3/1/2017 at 0900 Self Yes Yes   Sig: Take 40 mg by mouth daily. predniSONE (DELTASONE) 5 mg tablet 3/1/2017 at 0900 Self Yes Yes   Sig: Take 5 mg by mouth daily. warfarin (COUMADIN) 2.5 mg tablet 3/1/2017 at am Self Yes Yes   Sig: Take 5 mg by mouth daily.       Facility-Administered Medications: None     Thank you,  [employer] [facility]  Class of 2017

## 2017-03-02 NOTE — ED NOTES
TRANSFER - OUT REPORT:    Verbal report given to Christi Zhang RN(name) on Des Vaughan  being transferred to [facility](unit) for routine progression of care       Report consisted of patients Situation, Background, Assessment and   Recommendations(SBAR). Information from the following report(s) SBAR, Kardex, Intake/Output, Recent Results and Cardiac Rhythm Sinus Tach was reviewed with the receiving nurse. Lines:   Peripheral IV 03/01/17 Right Wrist (Active)   Site Assessment Clean, dry, & intact 3/1/2017 10:40 PM   Phlebitis Assessment 0 3/1/2017 10:40 PM   Infiltration Assessment 0 3/1/2017 10:40 PM   Dressing Status Clean, dry, & intact 3/1/2017 10:40 PM   Dressing Type Tape;Transparent 3/1/2017 10:40 PM   Hub Color/Line Status Pink;Flushed 3/1/2017 10:40 PM       Peripheral IV 03/01/17 Left Wrist (Active)   Site Assessment Clean, dry, & intact 3/1/2017 10:41 PM   Phlebitis Assessment 0 3/1/2017 10:41 PM   Infiltration Assessment 0 3/1/2017 10:41 PM   Dressing Type Tape;Transparent 3/1/2017 10:41 PM   Hub Color/Line Status Pink;Flushed 3/1/2017 10:41 PM        Opportunity for questions and clarification was provided.       Patient transported with:   Monitor  O2 @ 2 liters     Patient to be transported after CT scan

## 2017-03-02 NOTE — PROGRESS NOTES
Problem: Mobility Impaired (Adult and Pediatric)  Goal: *Acute Goals and Plan of Care (Insert Text)  Physical Therapy Goals  Initiated 3/2/2017  1. Patient will move from supine to sit and sit to supine in bed with modified independence within 7 day(s). 2. Patient will transfer from bed to chair and chair to bed with minimal assistance/contact guard assist using the least restrictive device within 7 day(s). 3. Patient will perform sit to stand with supervision/set-up within 7 day(s). 4. Patient will ambulate with minimal assistance/contact guard assist for 50 feet with the least restrictive device within 7 day(s). 5. Patient will ascend/descend 2 stairs with 1 handrail(s) with minimal assistance/contact guard assist within 7 day(s). PHYSICAL THERAPY EVALUATION  Patient: Manuela Connelly (80 y.o. female)  Date: 3/2/2017  Primary Diagnosis: CAP (community acquired pneumonia)        Precautions:   Fall      ASSESSMENT :  Based on the objective data described below, the patient presents with generalized weakness, decreased bed mobility, decreased transfers and functional ambulation, decreased O2 sats with activity, and increased risk for falls following admission for pneumonia. Patient was received in bed alert, oriented and agreeable to PT. She was found incontinent in bed and PT assisted her with hygiene needs. She stood and ambulated with rolling walker for 15 feet X2 with seated rest in between due to fatigue, weakness and SOB. Patient ambulated with and without O2 with improved sats with 2 liters. Patient noted to be blue around lips after ambulation without O2. Instructed her in pursed lip breathing.    Documentation for home O2:     ROOM AIR     AT REST    O2 SATS  94 HR  93   ROOM AIR WITH ACTIVITY 02 SATS  88 HR  92   (2    ) LITERS OF O2 WITH ACTIVITY O2 SATS  94 HR  86   (2    )LITERS OF 02 PATIENT LEFT COMFORTABLY  SITTING/SUPINE 02 SATS  98 HR  73   Patient's son arrived during PT and is very helpful with patient. Patient insists on returning home with her son and daughter in law. PT offered her a rollator but she prefers her rolling walker. She will need HHPT, home O2, and assistance at all times with gait due to high fall risk. Son is aware and a gait belt was given to him. .     Patient will benefit from skilled intervention to address the above impairments. Patients rehabilitation potential is considered to be Fair  Factors which may influence rehabilitation potential include:   [ ]         None noted  [ ]         Mental ability/status  [X]         Medical condition  [ ]         Home/family situation and support systems  [ ]         Safety awareness  [ ]         Pain tolerance/management  [ ]         Other:        PLAN :  Recommendations and Planned Interventions:  [X]           Bed Mobility Training             [ ]    Neuromuscular Re-Education  [X]           Transfer Training                   [ ]    Orthotic/Prosthetic Training  [X]           Gait Training                         [ ]    Modalities  [ ]           Therapeutic Exercises           [ ]    Edema Management/Control  [ ]           Therapeutic Activities            [ ]    Patient and Family Training/Education  [ ]           Other (comment):     Frequency/Duration: Patient will be followed by physical therapy  5 times a week to address goals. Discharge Recommendations: Home Health  Further Equipment Recommendations for Discharge: portable oxygen       SUBJECTIVE:   Patient stated I feel better than when I came in here.       OBJECTIVE DATA SUMMARY:   HISTORY:    Past Medical History:   Diagnosis Date    Acid reflux      Arthritis      Hx pulmonary embolism      Hypertension      IPF [facility]idiopathic pulmonary fibrosis) ([facility])       Past Surgical History:   Procedure Laterality Date    HX HYSTERECTOMY        HX KNEE REPLACEMENT         right    HX LUMBAR FUSION        HX TONSILLECTOMY         Prior Level of Function/Home Situation: limited mobility with walker and uses wheelchair. Has HHA 20 hours weekly. Personal factors and/or comorbidities impacting plan of care:      Home Situation  Home Environment: Private residence  # Steps to Enter: 3  Rails to Enter: Yes  One/Two Story Residence: Two story  Lift Chair Available: Yes  Living Alone: No  Support Systems: Family member(s)  Patient Expects to be Discharged to[de-identified] Private residence  Current DME Used/Available at Home: Wheelchair, Walker, rolling, Grab bars, Shower chair, Lift chair     EXAMINATION/PRESENTATION/DECISION MAKING:   Critical Behavior:  Neurologic State: Alert  Orientation Level: Oriented X4     Safety/Judgement: Insight into deficits  Skin:  Not fully observed  Strength:    Strength: Generally decreased, functional (BLE)                    Tone & Sensation:                  Sensation: Intact               Range Of Motion:  AROM: Generally decreased, functional (LLE less than RLE)                             Functional Mobility:  Bed Mobility:     Supine to Sit: Additional time; Moderate assistance        Transfers:  Sit to Stand: Additional time;Assist x2;Minimum assistance  Stand to Sit: Additional time;Assist x2;Minimum assistance        Bed to Chair: Additional time;Assist x2;Minimum assistance              Balance:   Sitting: Intact  Standing: Intact; With support  Ambulation/Gait Training:  Distance (ft): 15 Feet (ft) (x2 with seated rest in between)  Assistive Device: Walker, rolling;Gait belt  Ambulation - Level of Assistance: Minimal assistance;Assist x2        Gait Abnormalities: Decreased step clearance        Base of Support: Widened     Speed/Giselle: Pace decreased (<100 feet/min)  Step Length: Right shortened                                                             Functional Measure:  Tinetti test:      Sitting Balance: 1  Arises: 0  Attempts to Rise: 2  Immediate Standing Balance: 1  Standing Balance: 1  Nudged: 0  Eyes Closed: 0  Turn 360 Degrees - Continuous/Discontinuous: 0  Turn 360 Degrees - Steady/Unsteady: 0  Sitting Down: 1  Balance Score: 6  Indication of Gait: 1  R Step Length/Height: 0  L Step Length/Height: 1  R Foot Clearance: 1  L Foot Clearance: 1  Step Symmetry: 1  Step Continuity: 1  Path: 1  Trunk: 1  Walking Time: 0  Gait Score: 8  Total Score: 14         Tinetti Test and G-code impairment scale:  Percentage of Impairment CH     0%    CI     1-19% CJ     20-39% CK     40-59% CL     60-79% CM     80-99% CN      100%   Tinetti  Score 0-28 28 23-27 17-22 12-16 6-11 1-5 0          Tinetti Tool Score Risk of Falls  <19 = High Fall Risk  19-24 = Moderate Fall Risk  25-28 = Low Fall Risk  Tinetti ME. Performance-Oriented Assessment of Mobility Problems in Elderly Patients. [facility]; [id number]. (Scoring Description: PT Bulletin Feb. 10, 1993)     Older adults: Steffanie Bustamante et al, 2009; n = 1000 [facility] elderly evaluated with ABC, PATRICIA, ADL, and IADL)  · Mean PATRICIA score for males aged 69-68 years = 26.21(3.40)  · Mean PATRICIA score for females age 69-68 years = 25.16(4.30)  · Mean PATRICIA score for males over 80 years = 23.29(6.02)  · Mean PATRICIA score for females over 80 years = 17.20(8.32)            Physical Therapy Evaluation Charge Determination   History Examination Presentation Decision-Making   MEDIUM  Complexity : 1-2 comorbidities / personal factors will impact the outcome/ POC  MEDIUM Complexity : 3 Standardized tests and measures addressing body structure, function, activity limitation and / or participation in recreation  MEDIUM Complexity : Evolving with changing characteristics  MEDIUM Complexity : FOTO score of 26-74      Based on the above components, the patient evaluation is determined to be of the following complexity level: MEDIUM     Pain:  Pain Scale 1: Numeric (0 - 10)  Pain Intensity 1: 0              Activity Tolerance:   Limited by weakness, SOB and low O2 sats.   Please refer to the flowsheet for vital signs taken during this treatment. After treatment:   [X]         Patient left in no apparent distress sitting up in chair  [ ]         Patient left in no apparent distress in bed  [X]         Call bell left within reach  [X]         Nursing notified  [X]         Caregiver present  [ ]         Bed alarm activated      COMMUNICATION/EDUCATION:   The patients plan of care was discussed with: Registered Nurse and . [X]         Fall prevention education was provided and the patient/caregiver indicated understanding. [ ]         Patient/family have participated as able in goal setting and plan of care. [X]         Patient/family agree to work toward stated goals and plan of care. [ ]         Patient understands intent and goals of therapy, but is neutral about his/her participation. [ ]         Patient is unable to participate in goal setting and plan of care.      Thank you for this referral.  Analy Story, PT   Time Calculation: 40 mins

## 2017-03-02 NOTE — PROGRESS NOTES
Home Care Face to Face Encounter    Patients Name: Gabe Arteaga    YOB: 1933    Primary Diagnosis: Interstitial lung disease3    Date of Face to Face:  March 2, 2017                                  Face to Face Encounter findings are related to primary reason for home care:   yes. 1. I certify that the patient needs intermittent care as follows: skilled nursing care:  skilled observation/assessment, patient education and complex care plan management  physical therapy: strengthening, transfer training, balance training and pt/caregiver education  occupational therapy:  ADL safety (ie. cooking, bathing, dressing) and pt/caregiver education   Home oxygen at 2L per minute by nasal cannula continuous with portable oxygen    2. I certify that this patient is homebound, that is: 1) patient requires the use of a walker and wheelchair device, special transportation, or assistance of another to leave the home; or 2) patient's condition makes leaving the home medically contraindicated; and 3) patient has a normal inability to leave the home and leaving the home requires considerable and taxing effort. Patient may leave the home for infrequent and short duration for medical reasons, and occasional absences for non-medical reasons. Homebound status is due to the following functional limitations: Patient with increased shortness of breath and elevated heart rate with ambulation greater than 20 feet limiting patient's ability to ambulate safely within the community. Patient with strength deficits limiting the performance of all ADL's without caregiver assistance or the use of an assistive device. Patient with poor safety awareness and is at risk for falls without assistance of another person and the use of an assistive device. Patient with poor ambulation endurance limiting their safe ability to ascend/descend the required number of steps to leave the home.   Patient with increased shortness of breath with all exertional activity limiting ambulation outside the home. Patient with strength deficits limiting the ability to carry portable O2 for distances outside the home without the assistance of a caregiver. 3. I certify that this patient is under my care and that I, or a nurse practitioner or [facility], or clinical nurse specialist, or certified nurse midwife, working with me, had a Face-to-Face Encounter that meets the physician Face-to-Face Encounter requirements. The following are the clinical findings from the [address] [place] [address] encounter that support the need for skilled services and is a summary of the encounter: Patient cannot walk more than 20 feet without dyspnea and rest.  Underlying interstitial lung disease is severe, progressive, and has no effective treatments. Steroids and nebulization medications provide no relief. See PT note for oxygen desaturations.     See discharge summary      Adriane Franks MD  3/2/2017

## 2017-03-02 NOTE — ED TRIAGE NOTES
Patient has had grandchildren visiting recently and \"not feeling well\". Has been short of breath recently with wheezes at home.   Neb/zofran en route by ems

## 2017-03-02 NOTE — DISCHARGE SUMMARY
Physician Discharge Summary     Patient ID:  Lesli Stout  [id number]  80 y.o.  7/23/1933    Admit date: 3/1/2017    Discharge date and time: 3/2/2017    Admission Diagnoses: CAP (community acquired pneumonia)    Discharge Diagnoses:    Principal Diagnosis   Bronchitis                                             Other Diagnoses    Acute on chronic respiratory failure with hypoxia ([facility] (5/18/2013)    Esophageal reflux (5/18/2013)    Anemia, unspecified (5/18/2013)    Leukocytosis (5/21/2013)    Rheumatoid arthritis ([facility] (7/26/2015)    Hx of deep venous thrombosis (7/31/2015)    CKD (chronic kidney disease) stage 3, GFR 30-59 ml/min (3/1/2017)    IPF (idiopathic pulmonary fibrosis) ([facility]) ()    Hypertension ()    Acid reflux ()    Arthritis ()    Hx pulmonary embolism ()    Hospital Course:   Bronchitis / Leukocytosis - POA, appears to be better this AM. Likely viral, but complete Azithro course. NOT sepsis. Consulted pulmonary, who did not think any further inpatient workup needed. Rx guaifenesin and duonebs. Rx nebulizer     Acute respiratory failure with hypoxia - Check 6 minute walk for hypoxia. Unclear if chronic, with fibrosis. Wean oxygen if tolerated. She absolutely refuses to consider using home oxygen, even after I explained it can improve and prolong her life. Pulmonary wants her to consider full outpatient PFTs     IPF (idiopathic pulmonary fibrosis) - Noted on CT chest. Patient states she has never been told of this Dx and does not have a pulmonologist. Could this be rheumatoid? MTX damage? Consult pulmonology.     Anemia, unspecified - Worse this AM after hydration. Continue PO iron. Checking serologies.     Dehydration / CKD (chronic kidney disease) stage 3 - POA, a bit better after hydration.     Debiliated patient - Appears to be near her baseline, using a walker, and sometimes wheelchair.     Hx pulmonary embolism / Hx of deep venous thrombosis - On coumadin, but INR only 1.4     Hypertension - Continue norvasc, bystolic     Esophageal reflux - Continue PPI     Rheumatoid arthritis - On low dose prednisone, holding methotrexate     PCP: Tylor Porras MD    Consults: Pulmonary/Intensive care    Significant Diagnostic Studies: See Hospital Course    Discharged home in improved condition. Discharge Exam:   /69 (BP 1 Location: Left arm, BP Patient Position: At rest)    Pulse 88    Temp 98.7 °F (37.1 °C)    Resp 20    Ht 5' 6\" (1.676 m)    Wt 90.7 kg (200 lb)    SpO2 98%    BMI 32.28 kg/m2      Gen: Obese, in no acute distress  HEENT: Pink conjunctivae, PERRL, hearing intact to voice, moist mucous membranes  Neck: Supple, without masses, thyroid non-tender  Resp: No accessory muscle use, clear breath sounds without wheezes rales or rhonchi  Card: No murmurs, normal S1, S2 without thrills, bruits or peripheral edema  Abd: Soft, non-tender, non-distended, normoactive bowel sounds are present, no mass  Lymph: No cervical or inguinal adenopathy  Musc: No cyanosis or clubbing  Skin: No rashes or ulcers, skin turgor is good  Neuro: Cranial nerves are grossly intact, mild motor weakness, follows commands appropriately  Psych: Good insight, oriented to person, place and time, alert    Patient Instructions:   Current Discharge Medication List      START taking these medications    Details   albuterol-ipratropium (DUO-NEB) 2.5 mg-0.5 mg/3 ml nebu 3 mL by Nebulization route every six (6) hours as needed. Qty: 30 Nebule, Refills: 0      azithromycin (ZITHROMAX) 250 mg tablet Take 2 Tabs by mouth daily for 3 days. Qty: 3 Tab, Refills: 0      guaiFENesin ER (MUCINEX) 600 mg ER tablet Take 1 Tab by mouth every twelve (12) hours for 5 days. Can use over the counter forms  Qty: 10 Tab, Refills: 0      Nebulizer & Compressor machine 1 Each by Does Not Apply route three (3) times daily as needed.   Qty: 1 Each, Refills: 0         CONTINUE these medications which have NOT CHANGED    Details   conjugated estrogens (PREMARIN) 0.625 mg tablet Take 0.625 mg by mouth daily. warfarin (COUMADIN) 2.5 mg tablet Take 5 mg by mouth daily. amLODIPine (NORVASC) 5 mg tablet Take 1 Tab by mouth daily. Qty: 30 Tab, Refills: 0      folic acid (FOLVITE) 1 mg tablet Take 1 Tab by mouth daily. Qty: 30 Tab, Refills: 0      ferrous sulfate 325 mg (65 mg iron) tablet Take 325 mg by mouth Daily (before breakfast). pantoprazole (PROTONIX) 40 mg tablet Take 40 mg by mouth daily. nebivolol (BYSTOLIC) 5 mg tablet Take 5 mg by mouth daily. Indications: hypertension      predniSONE (DELTASONE) 5 mg tablet Take 5 mg by mouth daily. inFLIXimab (REMICADE) 100 mg injection 5 mg/kg by IntraVENous route once. Every 6 weeks         STOP taking these medications       methotrexate (RHEUMATREX) 2.5 mg tablet Comments:   Reason for Stopping:             Activity: Activity as tolerated  Diet: Regular Diet  Wound Care: None needed    Follow-up with your PCP in a few weeks.   Follow-up tests/labs - lung function studies with outpatient pulmonologist    Signed:  Bossman Degroot MD  3/2/2017  1:34 PM

## 2017-03-02 NOTE — ROUTINE PROCESS
Bedside and Verbal shift change report given to CHAZ Dumont (oncoming nurse) by Tanner Lazar RN (offgoing nurse). Report included the following information SBAR, Kardex, Procedure Summary, Intake/Output, MAR, Accordion, Recent Results and Med Rec Status.

## 2017-03-02 NOTE — PROGRESS NOTES
[facility] Dosing Services: 3/2/17  Consult for Warfarin Dosing by Pharmacy by Dr. Caty Villalba provided for this 80 y.o. female for indication of Hx of DVT/PE  Day of Therapy: resumed from home (PTA: Warfarin 5mg po every day)  Dose to achieve an INR goal of 2-3    Order entered for  Warfarin  5 (mg) ordered to be given today at 18:00. Significant drug interactions: Zithromax  Previous dose given 5mg yesterday taken at home   PT/INR Lab Results   Component Value Date/Time    INR 1.4 03/02/2017 11:46 AM      Platelets Lab Results   Component Value Date/Time    PLATELET 247 30/78/9796 05:11 AM      H/H Lab Results   Component Value Date/Time    HGB 8.8 03/02/2017 05:11 AM        Pharmacy to follow daily and will provide subsequent Warfarin dosing based on clinical status.   Samuel Mascorro  Contact information [phone]

## 2017-03-03 VITALS
TEMPERATURE: 97.6 F | DIASTOLIC BLOOD PRESSURE: 66 MMHG | BODY MASS INDEX: 32.14 KG/M2 | HEART RATE: 72 BPM | HEIGHT: 66 IN | WEIGHT: 200 LBS | RESPIRATION RATE: 16 BRPM | OXYGEN SATURATION: 95 % | SYSTOLIC BLOOD PRESSURE: 166 MMHG

## 2017-03-03 LAB
B PERT DNA SPEC QL NAA+PROBE: NOT DETECTED
C PNEUM DNA SPEC QL NAA+PROBE: NOT DETECTED
ERYTHROCYTE [DISTWIDTH] IN BLOOD BY AUTOMATED COUNT: 20.8 % (ref 11.5–14.5)
FLUAV H1 2009 PAND RNA SPEC QL NAA+PROBE: NOT DETECTED
FLUAV H1 RNA SPEC QL NAA+PROBE: NOT DETECTED
FLUAV H3 RNA SPEC QL NAA+PROBE: NOT DETECTED
FLUAV SUBTYP SPEC NAA+PROBE: NOT DETECTED
FLUBV RNA SPEC QL NAA+PROBE: NOT DETECTED
FLUID CULTURE, SPNG2: NORMAL
HADV DNA SPEC QL NAA+PROBE: NOT DETECTED
HCOV 229E RNA SPEC QL NAA+PROBE: NOT DETECTED
HCOV HKU1 RNA SPEC QL NAA+PROBE: NOT DETECTED
HCOV NL63 RNA SPEC QL NAA+PROBE: NOT DETECTED
HCOV OC43 RNA SPEC QL NAA+PROBE: NOT DETECTED
HCT VFR BLD AUTO: 32.5 % (ref 35–47)
HGB BLD-MCNC: 9.8 G/DL (ref 11.5–16)
HMPV RNA SPEC QL NAA+PROBE: NOT DETECTED
HPIV1 RNA SPEC QL NAA+PROBE: NOT DETECTED
HPIV2 RNA SPEC QL NAA+PROBE: NOT DETECTED
HPIV3 RNA SPEC QL NAA+PROBE: NOT DETECTED
HPIV4 RNA SPEC QL NAA+PROBE: NOT DETECTED
INR PPP: 1.3 (ref 0.9–1.1)
L PNEUMO1 AG UR QL IA: NEGATIVE
M PNEUMO DNA SPEC QL NAA+PROBE: NOT DETECTED
M PNEUMO IGG SER IA-ACNC: 202 U/ML (ref 0–99)
M PNEUMO IGM SER IA-ACNC: <770 U/ML (ref 0–769)
MCH RBC QN AUTO: 24.1 PG (ref 26–34)
MCHC RBC AUTO-ENTMCNC: 30.2 G/DL (ref 30–36.5)
MCV RBC AUTO: 80 FL (ref 80–99)
ORGANISM ID, SPNG3: NORMAL
PLATELET # BLD AUTO: 231 K/UL (ref 150–400)
PLEASE NOTE, SPNG4: NORMAL
PROTHROMBIN TIME: 13.3 SEC (ref 9–11.1)
RBC # BLD AUTO: 4.06 M/UL (ref 3.8–5.2)
RSV RNA SPEC QL NAA+PROBE: DETECTED
RV+EV RNA SPEC QL NAA+PROBE: NOT DETECTED
S PNEUM AG SPEC QL LA: NEGATIVE
SPECIMEN SOURCE: NORMAL
SPECIMEN SOURCE: NORMAL
SPECIMEN, SPNG1: NORMAL
WBC # BLD AUTO: 8 K/UL (ref 3.6–11)

## 2017-03-03 PROCEDURE — 94640 AIRWAY INHALATION TREATMENT: CPT

## 2017-03-03 PROCEDURE — 74011000250 HC RX REV CODE- 250: Performed by: INTERNAL MEDICINE

## 2017-03-03 PROCEDURE — 36415 COLL VENOUS BLD VENIPUNCTURE: CPT | Performed by: INTERNAL MEDICINE

## 2017-03-03 PROCEDURE — 87633 RESP VIRUS 12-25 TARGETS: CPT | Performed by: PHYSICIAN ASSISTANT

## 2017-03-03 PROCEDURE — 85027 COMPLETE CBC AUTOMATED: CPT | Performed by: INTERNAL MEDICINE

## 2017-03-03 PROCEDURE — 74011250637 HC RX REV CODE- 250/637: Performed by: INTERNAL MEDICINE

## 2017-03-03 PROCEDURE — 74011636637 HC RX REV CODE- 636/637: Performed by: INTERNAL MEDICINE

## 2017-03-03 PROCEDURE — 85610 PROTHROMBIN TIME: CPT | Performed by: INTERNAL MEDICINE

## 2017-03-03 PROCEDURE — 77010033678 HC OXYGEN DAILY

## 2017-03-03 RX ADMIN — IPRATROPIUM BROMIDE AND ALBUTEROL SULFATE 3 ML: .5; 2.5 SOLUTION RESPIRATORY (INHALATION) at 01:14

## 2017-03-03 RX ADMIN — NEBIVOLOL HYDROCHLORIDE 5 MG: 5 TABLET ORAL at 08:31

## 2017-03-03 RX ADMIN — AMLODIPINE BESYLATE 5 MG: 5 TABLET ORAL at 08:31

## 2017-03-03 RX ADMIN — GUAIFENESIN 600 MG: 600 TABLET, EXTENDED RELEASE ORAL at 08:31

## 2017-03-03 RX ADMIN — PANTOPRAZOLE SODIUM 40 MG: 40 TABLET, DELAYED RELEASE ORAL at 06:58

## 2017-03-03 RX ADMIN — Medication 10 ML: at 06:59

## 2017-03-03 RX ADMIN — PREDNISONE 5 MG: 5 TABLET ORAL at 08:31

## 2017-03-03 RX ADMIN — IRON 325 MG: 65 TABLET ORAL at 06:58

## 2017-03-03 RX ADMIN — FOLIC ACID 1 MG: 1 TABLET ORAL at 08:31

## 2017-03-03 NOTE — DISCHARGE SUMMARY
Physician Discharge Summary     Patient ID:  Latha Renteria  [id number]  80 y.o.  7/23/1933    Admit date: 3/1/2017    Discharge date and time: 3/3/2017    Admission Diagnoses: CAP (community acquired pneumonia)    Discharge Diagnoses:    Principal Diagnosis   Bronchitis                                             Other Diagnoses  Principal Problem:    Bronchitis (3/2/2017)    Active Problems:    Acute on chronic respiratory failure with hypoxia ([facility] [address] (5/18/2013)      Esophageal reflux (5/18/2013)      Anemia, unspecified (5/18/2013)      Leukocytosis (5/21/2013)      Rheumatoid arthritis ([facility] [address] (7/26/2015)      Hx of deep venous thrombosis (7/31/2015)      CKD (chronic kidney disease) stage 3, GFR 30-59 ml/min (3/1/2017)      IPF (idiopathic pulmonary fibrosis) (HCC) ()      Hypertension ()      Acid reflux ()      Arthritis ()      Hx pulmonary embolism ()         Hospital Course:     Bronchitis / Leukocytosis - POA, appears to be better this AM. Likely viral but complete Azithro course. NOT sepsis. OP pulm follow-up established. Home oxygen with exertion.     Acute respiratory failure with hypoxia - Uncertain diagnosis yet. Has hypoxia with exertion. Unclear if chronic, with fibrosis. Home oxygen for now     IPF (idiopathic pulmonary fibrosis) - Noted on CT chest. ILD v. UIP v. MTX induced. Pulm follow-up with close rheum follow-up for MTX alternative DMARD     Anemia, unspecified - Continue PO iron.      Dehydration / CKD (chronic kidney disease) stage 3 - POA, better after hydration.     Hx pulmonary embolism / Hx of deep venous thrombosis - On coumadin     Hypertension - Continue norvasc, bystolic     Esophageal reflux - Continue PPI     Rheumatoid arthritis - On low dose prednisone, holding methotrexate until eval by Rheum. Risk of worsening UIP v. RA flare favors stopping.  Still on remicade and pred    PCP: Sherrill Kirby MD    Consults: Pulmonary/Intensive care    Significant Diagnostic Studies: See Hospital Course    Discharged home in improved condition. Discharge Exam:  Visit Vitals    /66 (BP 1 Location: Left arm)    Pulse 72    Temp 97.6 °F (36.4 °C)    Resp 16    Ht 5' 6\" (1.676 m)    Wt 90.7 kg (200 lb)    SpO2 95%    Breastfeeding No    BMI 32.28 kg/m2     Physical Exam:    Gen: Well-developed, well-nourished, in no acute distress  HEENT:  Pink conjunctivae, hearing intact to voice, moist mucous membranes  Neck: Supple  Resp: No accessory muscle use, clear breath sounds without wheezes rales or rhonchi  Card: No murmurs, normal S1, S2 without thrills or peripheral edema  Abd:  Soft, non-tender, non-distended, normoactive bowel sounds are present  Musc: No cyanosis or clubbing  Skin: No rashes or ulcers, skin turgor is good  Neuro:  Cranial nerves are grossly intact,  strength is 5/5 bilaterally, hip flexion is 5/5 bilaterally, follows commands appropriately  Psych:  Good insight, oriented to person, place and time, alert    Disposition: home    Patient Instructions:   Current Discharge Medication List      START taking these medications    Details   albuterol-ipratropium (DUO-NEB) 2.5 mg-0.5 mg/3 ml nebu 3 mL by Nebulization route every six (6) hours as needed. Qty: 30 Nebule, Refills: 0      azithromycin (ZITHROMAX) 250 mg tablet Take 2 Tabs by mouth daily for 3 days. Qty: 3 Tab, Refills: 0      guaiFENesin ER (MUCINEX) 600 mg ER tablet Take 1 Tab by mouth every twelve (12) hours for 5 days. Can use over the counter forms  Qty: 10 Tab, Refills: 0      Nebulizer & Compressor machine 1 Each by Does Not Apply route three (3) times daily as needed. Qty: 1 Each, Refills: 0         CONTINUE these medications which have NOT CHANGED    Details   conjugated estrogens (PREMARIN) 0.625 mg tablet Take 0.625 mg by mouth daily. warfarin (COUMADIN) 2.5 mg tablet Take 5 mg by mouth daily. amLODIPine (NORVASC) 5 mg tablet Take 1 Tab by mouth daily.   Qty: 30 Tab, Refills: 0      folic acid (FOLVITE) 1 mg tablet Take 1 Tab by mouth daily. Qty: 30 Tab, Refills: 0      ferrous sulfate 325 mg (65 mg iron) tablet Take 325 mg by mouth Daily (before breakfast). pantoprazole (PROTONIX) 40 mg tablet Take 40 mg by mouth daily. nebivolol (BYSTOLIC) 5 mg tablet Take 5 mg by mouth daily. Indications: hypertension      predniSONE (DELTASONE) 5 mg tablet Take 5 mg by mouth daily. inFLIXimab (REMICADE) 100 mg injection 5 mg/kg by IntraVENous route once.  Every 6 weeks         STOP taking these medications       methotrexate (RHEUMATREX) 2.5 mg tablet Comments:   Reason for Stopping:             Activity: Activity as tolerated  Diet: Resume previous diet  Wound Care: None needed    Follow-up with Pulm as scheduled    Signed:  Roxi Glover DO  3/3/2017  10:04 AM    Approximately 25 mins was spent in coordination, counseling, and execution of patient's discharge

## 2017-03-03 NOTE — PROGRESS NOTES
3/3/2017 10:30 AM [employer] was sent pt's discharge clinicals and notified of pt's discharge today via [employer]. Pt's O2 tank was delivered to her in the room. Discussed home O2 with pt's son who is understanding pt's home O2 concentrator will be delivered to pt's home today after discharge. CM will follow.  VALERIY Lozano

## 2017-03-03 NOTE — DISCHARGE INSTRUCTIONS
Patient Discharge Instructions    Latha Renteria / [id number] : 1933    Admitted 3/1/2017 Discharged: 3/2/2017     Primary Diagnoses  Problem List as of 3/2/2017  Date Reviewed: 3/1/2017          Codes Class Noted - Resolved   IPF (idiopathic pulmonary fibrosis) (HCC)   Hypertension   Acid reflux   Arthritis   Hx pulmonary embolism   * (Principal)Bronchitis   CKD (chronic kidney disease) stage 3, GFR 30-59 ml/min   Hx of deep venous thrombosis   Rheumatoid arthritis (HCC)   Leukocytosis   Acute on chronic respiratory failure with hypoxia (HCC)   Esophageal reflux   Anemia, unspecified          Take Home Medications     · It is important that you take the medication exactly as they are prescribed. · Keep your medication in the bottles provided by the pharmacist and keep a list of the medication names, dosages, and times to be taken in your wallet. · Do not take other medications without consulting your doctor. What to do at Home    Recommended diet: Regular Diet    Recommended activity: Activity as tolerated and PT/OT per Home Health    If you experience worse breathing, please follow up with a pulmonologist.    Follow-up with your PCP in a few weeks      Interstitial Lung Disease: Care Instructions  Your Care Instructions  Interstitial lung disease is a long-term (chronic) lung disease. It happens because of damage between the air sacs in the lung. The damage scars the lung and causes breathing problems. People with interstitial lung disease get breathless during exercise and may have a dry cough. These problems may get worse slowly or very quickly. Interstitial lung disease can be caused by breathing in dust from asbestos and silica. It also can be caused by infections and some medicines. Sometimes doctors cannot find the cause. You may get medicine to treat the problem. Corticosteroids can sometimes reduce the swelling of lung tissue and prevent more damage.  Oxygen treatment may help your condition. Follow-up care is a key part of your treatment and safety. Be sure to make and go to all appointments, and call your doctor if you are having problems. Its also a good idea to know your test results and keep a list of the medicines you take. How can you care for yourself at home? · Do not smoke. Smoking makes interstitial lung disease worse. If you need help quitting, talk to your doctor about stop-smoking programs and medicines. These can increase your chances of quitting for good. · Take your medicines exactly as prescribed. Call your doctor if you have any problems with your medicine. · Get flu and pneumococcal shots. These help prevent lung infection. · Make an exercise plan with help from your doctor or other health professional. Exercise can help you breathe more easily. · Think about joining a support group. This can help you cope with problems caused by interstitial lung disease. When should you call for help? Call your doctor now or seek immediate medical care if:  · Your shortness of breath gets worse. · You cough up blood. · You have severe chest pain. Watch closely for changes in your health, and be sure to contact your doctor if you have any problems. Where can you learn more? Go to http://luis e-bridget.info/. Enter R158 in the search box to learn more about \"Interstitial Lung Disease: Care Instructions. \"  Current as of: May 23, 2016  Content Version: 11.1  © 9273-1690 [employer]. Care instructions adapted under license by [employer] (which disclaims liability or warranty for this information). If you have questions about a medical condition or this instruction, always ask your healthcare professional. [place] [address] any warranty or liability for your use of this information. Information obtained by :  I understand that if any problems occur once I am at home I am to contact my physician.     I understand and acknowledge receipt of the instructions indicated above.                                                                                                                                            Physician's or R.N.'s Signature                                                                  Date/Time                                                                                                                                              Patient or Representative Signature                                                          Date/Time

## 2017-03-03 NOTE — PROGRESS NOTES
Bedside and Verbal shift change report given to Dana Hong (oncoming nurse) by Manley Bosworth (offgoing nurse). Report included the following information SBAR, Kardex, MAR and Recent Results.

## 2017-03-03 NOTE — ROUTINE PROCESS
Pt received discharge instructions, scripts, oxygen tank, and nebulizer machine - Son was in room and we discussed follow up appointments, use of equiment, home delivery of Oxygen and importance of use - Both Patient and Son verbalized understanding  - voiced no concerns at this time

## 2017-03-06 LAB
BACTERIA SPEC CULT: NORMAL
SERVICE CMNT-IMP: NORMAL

## 2017-03-07 ENCOUNTER — HOSPITAL ENCOUNTER (INPATIENT)
Age: 82
LOS: 8 days | Discharge: NURSING, REHAB OR LONG-TERM CARE | DRG: 853 | End: 2017-03-15
Attending: EMERGENCY MEDICINE | Admitting: INTERNAL MEDICINE
Payer: MEDICARE

## 2017-03-07 ENCOUNTER — APPOINTMENT (OUTPATIENT)
Dept: ULTRASOUND IMAGING | Age: 82
DRG: 853 | End: 2017-03-07
Attending: EMERGENCY MEDICINE
Payer: MEDICARE

## 2017-03-07 ENCOUNTER — APPOINTMENT (OUTPATIENT)
Dept: GENERAL RADIOLOGY | Age: 82
DRG: 853 | End: 2017-03-07
Attending: EMERGENCY MEDICINE
Payer: MEDICARE

## 2017-03-07 ENCOUNTER — APPOINTMENT (OUTPATIENT)
Dept: CT IMAGING | Age: 82
DRG: 853 | End: 2017-03-07
Attending: EMERGENCY MEDICINE
Payer: MEDICARE

## 2017-03-07 DIAGNOSIS — K81.9 CHOLECYSTITIS: Primary | ICD-10-CM

## 2017-03-07 DIAGNOSIS — N17.9 AKI (ACUTE KIDNEY INJURY) (HCC): ICD-10-CM

## 2017-03-07 DIAGNOSIS — D72.829 LEUKOCYTOSIS, UNSPECIFIED TYPE: ICD-10-CM

## 2017-03-07 PROBLEM — D72.825 BANDEMIA: Status: ACTIVE | Noted: 2017-03-07

## 2017-03-07 PROBLEM — R11.2 NAUSEA & VOMITING: Status: ACTIVE | Noted: 2017-03-07

## 2017-03-07 PROBLEM — R19.7 DIARRHEA: Status: ACTIVE | Noted: 2017-03-07

## 2017-03-07 PROBLEM — K81.0 ACUTE CHOLECYSTITIS: Status: ACTIVE | Noted: 2017-03-07

## 2017-03-07 PROBLEM — J40 BRONCHITIS: Status: RESOLVED | Noted: 2017-03-02 | Resolved: 2017-03-07

## 2017-03-07 PROBLEM — R53.1 WEAKNESS: Status: ACTIVE | Noted: 2017-03-07

## 2017-03-07 PROBLEM — D72.825 BANDEMIA: Status: RESOLVED | Noted: 2017-03-07 | Resolved: 2017-03-07

## 2017-03-07 PROBLEM — R53.81 DEBILITATED PATIENT: Status: ACTIVE | Noted: 2017-03-07

## 2017-03-07 PROBLEM — E87.1 HYPONATREMIA: Status: ACTIVE | Noted: 2017-03-07

## 2017-03-07 LAB
ALBUMIN SERPL BCP-MCNC: 2.4 G/DL (ref 3.5–5)
ALBUMIN/GLOB SERPL: 0.4 {RATIO} (ref 1.1–2.2)
ALP SERPL-CCNC: 91 U/L (ref 45–117)
ALT SERPL-CCNC: 22 U/L (ref 12–78)
ANION GAP BLD CALC-SCNC: 14 MMOL/L (ref 5–15)
AST SERPL W P-5'-P-CCNC: 29 U/L (ref 15–37)
BILIRUB SERPL-MCNC: 1.1 MG/DL (ref 0.2–1)
BUN SERPL-MCNC: 31 MG/DL (ref 6–20)
BUN/CREAT SERPL: 14 (ref 12–20)
CALCIUM SERPL-MCNC: 8.6 MG/DL (ref 8.5–10.1)
CHLORIDE SERPL-SCNC: 97 MMOL/L (ref 97–108)
CO2 SERPL-SCNC: 21 MMOL/L (ref 21–32)
CREAT SERPL-MCNC: 2.14 MG/DL (ref 0.55–1.02)
GLOBULIN SER CALC-MCNC: 5.5 G/DL (ref 2–4)
GLUCOSE SERPL-MCNC: 155 MG/DL (ref 65–100)
INR PPP: 1.9 (ref 0.9–1.1)
LACTATE SERPL-SCNC: 1.5 MMOL/L (ref 0.4–2)
LIPASE SERPL-CCNC: 119 U/L (ref 73–393)
NRBC # BLD: 0 K/UL (ref 0–0.01)
NRBC BLD-RTO: 0 PER 100 WBC
POTASSIUM SERPL-SCNC: 3.9 MMOL/L (ref 3.5–5.1)
PROT SERPL-MCNC: 7.9 G/DL (ref 6.4–8.2)
PROTHROMBIN TIME: 19.8 SEC (ref 9–11.1)
SODIUM SERPL-SCNC: 132 MMOL/L (ref 136–145)

## 2017-03-07 PROCEDURE — 74011250636 HC RX REV CODE- 250/636: Performed by: EMERGENCY MEDICINE

## 2017-03-07 PROCEDURE — 93005 ELECTROCARDIOGRAM TRACING: CPT

## 2017-03-07 PROCEDURE — 87493 C DIFF AMPLIFIED PROBE: CPT | Performed by: EMERGENCY MEDICINE

## 2017-03-07 PROCEDURE — 71010 XR CHEST PORT: CPT

## 2017-03-07 PROCEDURE — 36415 COLL VENOUS BLD VENIPUNCTURE: CPT | Performed by: EMERGENCY MEDICINE

## 2017-03-07 PROCEDURE — 65660000000 HC RM CCU STEPDOWN

## 2017-03-07 PROCEDURE — 99285 EMERGENCY DEPT VISIT HI MDM: CPT

## 2017-03-07 PROCEDURE — 74011000250 HC RX REV CODE- 250: Performed by: INTERNAL MEDICINE

## 2017-03-07 PROCEDURE — 85610 PROTHROMBIN TIME: CPT | Performed by: EMERGENCY MEDICINE

## 2017-03-07 PROCEDURE — 76705 ECHO EXAM OF ABDOMEN: CPT

## 2017-03-07 PROCEDURE — 83605 ASSAY OF LACTIC ACID: CPT | Performed by: EMERGENCY MEDICINE

## 2017-03-07 PROCEDURE — 74176 CT ABD & PELVIS W/O CONTRAST: CPT

## 2017-03-07 PROCEDURE — 85025 COMPLETE CBC W/AUTO DIFF WBC: CPT | Performed by: EMERGENCY MEDICINE

## 2017-03-07 PROCEDURE — 74011250636 HC RX REV CODE- 250/636: Performed by: INTERNAL MEDICINE

## 2017-03-07 PROCEDURE — C9113 INJ PANTOPRAZOLE SODIUM, VIA: HCPCS | Performed by: INTERNAL MEDICINE

## 2017-03-07 PROCEDURE — 96375 TX/PRO/DX INJ NEW DRUG ADDON: CPT

## 2017-03-07 PROCEDURE — 83690 ASSAY OF LIPASE: CPT | Performed by: EMERGENCY MEDICINE

## 2017-03-07 PROCEDURE — 96365 THER/PROPH/DIAG IV INF INIT: CPT

## 2017-03-07 PROCEDURE — 74011000258 HC RX REV CODE- 258: Performed by: EMERGENCY MEDICINE

## 2017-03-07 PROCEDURE — 80053 COMPREHEN METABOLIC PANEL: CPT | Performed by: EMERGENCY MEDICINE

## 2017-03-07 RX ORDER — IPRATROPIUM BROMIDE AND ALBUTEROL SULFATE 2.5; .5 MG/3ML; MG/3ML
3 SOLUTION RESPIRATORY (INHALATION)
Status: DISCONTINUED | OUTPATIENT
Start: 2017-03-07 | End: 2017-03-10

## 2017-03-07 RX ORDER — ONDANSETRON 2 MG/ML
4 INJECTION INTRAMUSCULAR; INTRAVENOUS
Status: DISCONTINUED | OUTPATIENT
Start: 2017-03-07 | End: 2017-03-15 | Stop reason: HOSPADM

## 2017-03-07 RX ORDER — SODIUM CHLORIDE 9 MG/ML
100 INJECTION, SOLUTION INTRAVENOUS CONTINUOUS
Status: DISCONTINUED | OUTPATIENT
Start: 2017-03-07 | End: 2017-03-09

## 2017-03-07 RX ORDER — ACETAMINOPHEN 325 MG/1
650 TABLET ORAL
Status: DISCONTINUED | OUTPATIENT
Start: 2017-03-07 | End: 2017-03-15 | Stop reason: HOSPADM

## 2017-03-07 RX ORDER — HYDROCORTISONE SODIUM SUCCINATE 100 MG/2ML
100 INJECTION, POWDER, FOR SOLUTION INTRAMUSCULAR; INTRAVENOUS EVERY 6 HOURS
Status: DISCONTINUED | OUTPATIENT
Start: 2017-03-07 | End: 2017-03-08

## 2017-03-07 RX ORDER — NALOXONE HYDROCHLORIDE 0.4 MG/ML
0.4 INJECTION, SOLUTION INTRAMUSCULAR; INTRAVENOUS; SUBCUTANEOUS AS NEEDED
Status: DISCONTINUED | OUTPATIENT
Start: 2017-03-07 | End: 2017-03-15 | Stop reason: HOSPADM

## 2017-03-07 RX ORDER — MORPHINE SULFATE 2 MG/ML
1 INJECTION, SOLUTION INTRAMUSCULAR; INTRAVENOUS
Status: DISCONTINUED | OUTPATIENT
Start: 2017-03-07 | End: 2017-03-08

## 2017-03-07 RX ORDER — DIPHENHYDRAMINE HYDROCHLORIDE 50 MG/ML
12.5 INJECTION, SOLUTION INTRAMUSCULAR; INTRAVENOUS
Status: DISCONTINUED | OUTPATIENT
Start: 2017-03-07 | End: 2017-03-15 | Stop reason: HOSPADM

## 2017-03-07 RX ORDER — FENTANYL CITRATE 50 UG/ML
25 INJECTION, SOLUTION INTRAMUSCULAR; INTRAVENOUS
Status: COMPLETED | OUTPATIENT
Start: 2017-03-07 | End: 2017-03-07

## 2017-03-07 RX ADMIN — Medication 1 MG: at 23:53

## 2017-03-07 RX ADMIN — SODIUM CHLORIDE 1000 ML: 900 INJECTION, SOLUTION INTRAVENOUS at 18:52

## 2017-03-07 RX ADMIN — SODIUM CHLORIDE 100 ML/HR: 900 INJECTION, SOLUTION INTRAVENOUS at 20:00

## 2017-03-07 RX ADMIN — SODIUM CHLORIDE 100 ML/HR: 900 INJECTION, SOLUTION INTRAVENOUS at 23:53

## 2017-03-07 RX ADMIN — SODIUM CHLORIDE 500 ML: 900 INJECTION, SOLUTION INTRAVENOUS at 16:21

## 2017-03-07 RX ADMIN — HYDROCORTISONE SODIUM SUCCINATE 100 MG: 100 INJECTION, POWDER, FOR SOLUTION INTRAMUSCULAR; INTRAVENOUS at 23:53

## 2017-03-07 RX ADMIN — HYDROCORTISONE SODIUM SUCCINATE 100 MG: 100 INJECTION, POWDER, FOR SOLUTION INTRAMUSCULAR; INTRAVENOUS at 19:58

## 2017-03-07 RX ADMIN — PIPERACILLIN SODIUM,TAZOBACTAM SODIUM 3.38 G: 3; .375 INJECTION, POWDER, FOR SOLUTION INTRAVENOUS at 17:04

## 2017-03-07 RX ADMIN — SODIUM CHLORIDE 40 MG: 9 INJECTION INTRAMUSCULAR; INTRAVENOUS; SUBCUTANEOUS at 22:38

## 2017-03-07 RX ADMIN — FENTANYL CITRATE 25 MCG: 50 INJECTION, SOLUTION INTRAMUSCULAR; INTRAVENOUS at 16:35

## 2017-03-07 NOTE — PROGRESS NOTES
[facility] Dosing Services: 3/7/17    Pharmacist Renal Dosing Progress Note for Dr Lilliana Hoyos      The following medication: Zosyn was automatically dose-adjusted per [facility] P&T Committee Protocol, with respect to renal function. Pt Weight:   Wt Readings from Last 1 Encounters:   03/07/17 90.7 kg (200 lb)         Previous Regimen  Zosyn 3.375gm ivpb q12h   Serum Creatinine Lab Results   Component Value Date/Time    Creatinine 2.14 03/07/2017 04:21 PM       Creatinine Clearance Estimated Creatinine Clearance: 22.6 mL/min (based on Cr of 2.14). BUN Lab Results   Component Value Date/Time    BUN 31 03/07/2017 04:21 PM       Dosage changed to:  3.375gm ivpb q8h    If CrCl falls below 20ml/min, then schedule will be reduced to q12h    Pharmacy to continue to monitor patient daily. Will make dosage adjustments based upon changing renal function.   Antoinette Lai, PHARMD. Contact information:  [phone]

## 2017-03-07 NOTE — IP AVS SNAPSHOT
[address] [place] [address] 104 1007 [facility] 
[phone] Patient: Tamika Medina MRN: [id number] ODC:[birth year] You are allergic to the following Allergen Reactions Ciprofloxacin Other (comments) \"Makes her act weird\" per son Recent Documentation Height Weight Breastfeeding? BMI OB Status Smoking Status 1.626 m 88.5 kg No 33.47 kg/m2 Hysterectomy Never Smoker Emergency Contacts Name Discharge Info Relation Home Work Mobile Fabián Coburn DISCHARGE CAREGIVER [3] Child [2]   [phone] About your hospitalization You were admitted on:  March 7, 2017 You last received care in the:  [facility] 5M1 MED SURG 1 You were discharged on:  March 15, 2017 Unit phone number:  [phone] Why you were hospitalized Your primary diagnosis was:  Acute Cholecystitis Your diagnoses also included:  Bandemia, Leukocytosis, Rheumatoid Arthritis (Hcc), Ipf (Idiopathic Pulmonary Fibrosis) (Hcc), Hypertension, Hx Pulmonary Embolism, Hx Of Deep Venous Thrombosis, Esophageal Reflux, Ckd (Chronic Kidney Disease) Stage 3, Gfr 30-59 Ml/Min, Arthritis, Arf (Acute Renal Failure) (Hcc), Hyponatremia, Nausea & Vomiting, Diarrhea, Weakness, Debilitated Patient Providers Seen During Your Hospitalizations Provider Role Specialty Primary office phone Huang Arteaga MD Attending Provider Emergency Medicine [phone] Harish Betts MD Attending Provider Internal Medicine [phone] Eva Palmer MD Attending Provider Internal Medicine [phone] Gopi Brown MD Attending Provider Internal Medicine [phone] Your Primary Care Physician (PCP) Primary Care Physician Office Phone Office Fax Xenia Bell [phone] Follow-up Information Follow up With Details Comments Contact Info Dolly Menjivar MD  to schedule follow up after rehab [address] [place] [address] [address] [facility] 
[phone] Roberto Carlos Kahn MD Call to confirm follow up in 2 weeks [address] [facility] [address] 
[phone] [facility] ([phone]) Current Discharge Medication List  
  
START taking these medications Dose & Instructions Dispensing Information Comments Morning Noon Evening Bedtime  
 oxyCODONE IR 5 mg immediate release tablet Commonly known as:  Sherwin Geoff Your last dose was: Your next dose is:    
   
   
 Dose:  5 mg Take 1 Tab by mouth every six (6) hours as needed for up to 15 doses. Max Daily Amount: 20 mg.  
 Quantity:  15 Tab Refills:  0  
     
   
   
   
  
 senna-docusate 8.6-50 mg per tablet Commonly known as:  SENNA WITH DOCUSATE SODIUM Your last dose was: Your next dose is:    
   
   
 Dose:  1 Tab Take 1 Tab by mouth daily for 30 days. Quantity:  30 Tab Refills:  0 CONTINUE these medications which have CHANGED Dose & Instructions Dispensing Information Comments Morning Noon Evening Bedtime  
 amLODIPine 10 mg tablet Commonly known as:  Rita Jorge What changed:   
- medication strength 
- how much to take Your last dose was: Your next dose is:    
   
   
 Dose:  10 mg Take 1 Tab by mouth daily for 30 days. Quantity:  30 Tab Refills:  0 CONTINUE these medications which have NOT CHANGED Dose & Instructions Dispensing Information Comments Morning Noon Evening Bedtime  
 albuterol-ipratropium 2.5 mg-0.5 mg/3 ml Nebu Commonly known as:  Ernesto Bence Your last dose was: Your next dose is:    
   
   
 Dose:  3 mL  
3 mL by Nebulization route every six (6) hours as needed. Quantity:  30 Nebule Refills:  0  
     
   
   
   
  
 BYSTOLIC 5 mg tablet Generic drug:  nebivolol Your last dose was: Your next dose is:    
   
   
 Dose:  5 mg Take 5 mg by mouth daily. Indications: hypertension Refills:  0  
     
   
   
   
  
 ergocalciferol 50,000 unit capsule Commonly known as:  ERGOCALCIFEROL Your last dose was: Your next dose is:    
   
   
 Dose:  86332 Units Take 50,000 Units by mouth every thirty (30) days. Refills:  0  
     
   
   
   
  
 folic acid 1 mg tablet Commonly known as:  [employer] Your last dose was: Your next dose is:    
   
   
 Dose:  1 mg Take 1 Tab by mouth daily. Quantity:  30 Tab Refills:  0  
     
   
   
   
  
 mirabegron ER 50 mg ER tablet Commonly known as:  MYRBETRIQ Your last dose was: Your next dose is:    
   
   
 Dose:  50 mg Take 50 mg by mouth daily. Refills:  0  
     
   
   
   
  
 predniSONE 5 mg tablet Commonly known as:  Reese Funes Your last dose was: Your next dose is:    
   
   
 Dose:  5 mg Take 5 mg by mouth daily. Refills:  0 PROTONIX 40 mg tablet Generic drug:  pantoprazole Your last dose was: Your next dose is:    
   
   
 Dose:  40 mg Take 40 mg by mouth daily. Refills:  0  
     
   
   
   
  
 * warfarin 2.5 mg tablet Commonly known as:  COUMADIN Your last dose was: Your next dose is:    
   
   
 Dose:  5 mg Take 5 mg by mouth every Monday and Thursday. Refills:  0  
     
   
   
   
  
 * warfarin 2.5 mg tablet Commonly known as:  COUMADIN Your last dose was: Your next dose is:    
   
   
 Dose:  2.5 mg Take 2.5 mg by mouth five (5) days a week. Takes 2.5mg on Sunday, Tuesday, Wednesday, Friday, Saturday Refills:  0  
     
   
   
   
  
 * Notice: This list has 2 medication(s) that are the same as other medications prescribed for you. Read the directions carefully, and ask your doctor or other care provider to review them with you. STOP taking these medications DULCOLAX (BISACODYL) 5 mg EC tablet Generic drug:  bisacodyl REMICADE 100 mg injection Generic drug:  inFLIXimab Where to Get Your Medications Information on where to get these meds will be given to you by the nurse or doctor. ! Ask your nurse or doctor about these medications  
  amLODIPine 10 mg tablet  
 oxyCODONE IR 5 mg immediate release tablet  
 senna-docusate 8.6-50 mg per tablet Discharge Instructions HOSPITALIST DISCHARGE INSTRUCTIONS 
 
NAME: Obi Craft :  1933 MRN:  [id number] Date:     3/15/2017 ADMIT DATE: 3/7/2017 DISCHARGE DATE: 3/15/2017 PRINCIPAL ADMITTING DIAGNOSIS: 
Acute cholecystitis 
gallstones DISCHARGE DIAGNOSES: 
Principal Problem: 
  Acute cholecystitis (3/7/2017) Esophageal reflux (2013) ARF (acute renal failure) ([facility] [address] (2013) Rheumatoid arthritis ([facility] [address] (2015) Hx of deep venous thrombosis (2015) CKD (chronic kidney disease) stage 3, GFR 30-59 ml/min (3/1/2017) IPF (idiopathic pulmonary fibrosis) ([facility]) () Hypertension Arthritis Hx pulmonary embolism Weakness (3/7/2017) Debilitated patient (3/7/2017) MEDICATIONS: 
 
· It is important that medications are taken exactly as they are prescribed on the discharge medication instructions and keep them your  in the bottles provided by the pharmacist.  
· Keep a list of the medication names, dosages, and times to be taken at all times. · Do not take other medications without consulting your doctor. · Monitor INR weekly start 3/17 Recommended diet:  Regular Diet Recommended activity: Activity as tolerated Post discharge care: 
 
Notify follow up health care provider or return to the emergency department if you cannot get hold of your doctor if you feel worse or experience symptoms similar to those that brought you to hospital 
 
Follow-up Information Follow up With Details Comments Contact Info Meagan Johnson MD  to schedule follow up after rehab [address] [facility] 
[phone] Izaiah Middleton MD Call to confirm follow up in 2 weeks [address] [facility] [address] [facility] [address] [facility] 
[phone] Information obtained by : 
I understand that if any problems occur once I am at home I am to contact my physician and I understand and acknowledge receipt of the instructions indicated above. Physician's or R.N.'s Signature                                                                  Date/Time Patient or Representative Signature                                                          Date/Time Discharge Orders None [employer] Announcement We are excited to announce that we are making your provider's discharge notes available to you in [employer]. You will see these notes when they are completed and signed by the physician that discharged you from your recent hospital stay. If you have any questions or concerns about any information you see in [employer], please call the Health Information Department where you were seen or reach out to your Primary Care Provider for more information about your plan of care. Introducing [facility]! [facility] introduces [employer] patient portal. Now you can access parts of your medical record, email your doctor's office, and request medication refills online. 1. In your internet browser, go to [email or web address] 2. Click on the First Time User? Click Here link in the Sign In box. You will see the New Member Sign Up page. 3. Enter your [employer] Access Code exactly as it appears below. You will not need to use this code after youve completed the sign-up process. If you do not sign up before the expiration date, you must request a new code. · [employer] Access Code: [facility] Expires: 5/31/2017 12:10 AM 
 
4. Enter the last four digits of your Social Security Number (xxxx) and Date of Birth (mm/dd/yyyy) as indicated and click Submit. You will be taken to the next sign-up page. 5. Create a [employer] ID. This will be your [employer] login ID and cannot be changed, so think of one that is secure and easy to remember. 6. Create a [employer] password. You can change your password at any time. 7. Enter your Password Reset Question and Answer. This can be used at a later time if you forget your password. 8. Enter your e-mail address. You will receive e-mail notification when new information is available in [address] 9. Click Sign Up. You can now view and download portions of your medical record. 10. Click the Download Summary menu link to download a portable copy of your medical information. If you have questions, please visit the Frequently Asked Questions section of the [employer] website. Remember, [employer] is NOT to be used for urgent needs. For medical emergencies, dial 911. Now available from your iPhone and Android! General Information Please provide this summary of care documentation to your next provider. Patient Signature:  ____________________________________________________________ Date:  ____________________________________________________________  
  
Jewels Hagan Provider Signature:  ____________________________________________________________ Date:  ____________________________________________________________

## 2017-03-07 NOTE — IP AVS SNAPSHOT
Current Discharge Medication List  
  
START taking these medications Dose & Instructions Dispensing Information Comments Morning Noon Evening Bedtime  
 oxyCODONE IR 5 mg immediate release tablet Commonly known as:  Evelyne Scott Your last dose was: Your next dose is:    
   
   
 Dose:  5 mg Take 1 Tab by mouth every six (6) hours as needed for up to 15 doses. Max Daily Amount: 20 mg.  
 Quantity:  15 Tab Refills:  0  
     
   
   
   
  
 senna-docusate 8.6-50 mg per tablet Commonly known as:  SENNA WITH DOCUSATE SODIUM Your last dose was: Your next dose is:    
   
   
 Dose:  1 Tab Take 1 Tab by mouth daily for 30 days. Quantity:  30 Tab Refills:  0 CONTINUE these medications which have CHANGED Dose & Instructions Dispensing Information Comments Morning Noon Evening Bedtime  
 amLODIPine 10 mg tablet Commonly known as:  Lefty Peguero What changed:   
- medication strength 
- how much to take Your last dose was: Your next dose is:    
   
   
 Dose:  10 mg Take 1 Tab by mouth daily for 30 days. Quantity:  30 Tab Refills:  0 CONTINUE these medications which have NOT CHANGED Dose & Instructions Dispensing Information Comments Morning Noon Evening Bedtime  
 albuterol-ipratropium 2.5 mg-0.5 mg/3 ml Nebu Commonly known as:  Darlene Medicus Your last dose was: Your next dose is:    
   
   
 Dose:  3 mL  
3 mL by Nebulization route every six (6) hours as needed. Quantity:  30 Nebule Refills:  0  
     
   
   
   
  
 BYSTOLIC 5 mg tablet Generic drug:  nebivolol Your last dose was: Your next dose is:    
   
   
 Dose:  5 mg Take 5 mg by mouth daily. Indications: hypertension Refills:  0  
     
   
   
   
  
 ergocalciferol 50,000 unit capsule Commonly known as:  ERGOCALCIFEROL Your last dose was: Your next dose is:    
   
   
 Dose:  87990 Units Take 50,000 Units by mouth every thirty (30) days. Refills:  0  
     
   
   
   
  
 folic acid 1 mg tablet Commonly known as:  [employer] Your last dose was: Your next dose is:    
   
   
 Dose:  1 mg Take 1 Tab by mouth daily. Quantity:  30 Tab Refills:  0  
     
   
   
   
  
 mirabegron ER 50 mg ER tablet Commonly known as:  MYRBETRIQ Your last dose was: Your next dose is:    
   
   
 Dose:  50 mg Take 50 mg by mouth daily. Refills:  0  
     
   
   
   
  
 predniSONE 5 mg tablet Commonly known as:  Cleo Sarbjit Your last dose was: Your next dose is:    
   
   
 Dose:  5 mg Take 5 mg by mouth daily. Refills:  0 PROTONIX 40 mg tablet Generic drug:  pantoprazole Your last dose was: Your next dose is:    
   
   
 Dose:  40 mg Take 40 mg by mouth daily. Refills:  0  
     
   
   
   
  
 * warfarin 2.5 mg tablet Commonly known as:  COUMADIN Your last dose was: Your next dose is:    
   
   
 Dose:  5 mg Take 5 mg by mouth every Monday and Thursday. Refills:  0  
     
   
   
   
  
 * warfarin 2.5 mg tablet Commonly known as:  COUMADIN Your last dose was: Your next dose is:    
   
   
 Dose:  2.5 mg Take 2.5 mg by mouth five (5) days a week. Takes 2.5mg on Sunday, Tuesday, Wednesday, Friday, Saturday Refills:  0  
     
   
   
   
  
 * Notice: This list has 2 medication(s) that are the same as other medications prescribed for you. Read the directions carefully, and ask your doctor or other care provider to review them with you. STOP taking these medications DULCOLAX (BISACODYL) 5 mg EC tablet Generic drug:  bisacodyl REMICADE 100 mg injection Generic drug:  inFLIXimab Where to Get Your Medications Information on where to get these meds will be given to you by the nurse or doctor. ! Ask your nurse or doctor about these medications  
  amLODIPine 10 mg tablet  
 oxyCODONE IR 5 mg immediate release tablet  
 senna-docusate 8.6-50 mg per tablet

## 2017-03-07 NOTE — ED PROVIDER NOTES
HPI Comments: 80 y.o. female with past medical history significant for HTN, IPF and PE who presents from home via EMS with chief complaint of generalized weakness. Per son, pt was released from the hospital 4 days ago and was doing okay the next day, but 2 days ago began developing nausea and vomiting which lasted 24 hours and diarrhea which has persisted. Pt says that she has been having 3 episodes of diarrhea daily. Pt has been on a z-pack as a result of her recent hospitalization. Per son, pt has become extremely weak, soiling herself because she is unable to get up and go to the bathroom. Pt's son notes that the pt is usually able to get around and go to the bathroom on her own, with her walker. Pt's son also claims that the pt has been experiencing abdominal pain under the \"breast bone\", in addition to decreased appetite as well. Pt says that her SOB has improved. Per son, pt takes coumadin due to blood clots. Per son, pt had an INR of 1.8 yesterday which is where the pt's PCP likes to have the pt. Pt denies blood in stool. There are no other acute medical concerns at this time. PCP: Tylor Porras MD    Old Chart Review:   Pt was seen 3/1-3/3 for PNA and pulmonary fibrosis. Note written by Chaka Hogue. Tam Desai, as dictated by David Hopkins MD 3:30 PM      The history is provided by the patient and a relative (son). No  was used. Past Medical History:   Diagnosis Date    Acid reflux     Arthritis     Hx pulmonary embolism     Hypertension     IPF (idiopathic pulmonary fibrosis) (HCC)        Past Surgical History:   Procedure Laterality Date    HX HYSTERECTOMY      HX KNEE REPLACEMENT      right    HX LUMBAR FUSION      HX TONSILLECTOMY           No family history on file. Social History     Social History    Marital status:      Spouse name: N/A    Number of children: N/A    Years of education: N/A     Occupational History    Not on file. Social History Main Topics    Smoking status: Never Smoker    Smokeless tobacco: Never Used    Alcohol use No    Drug use: No    Sexual activity: Not on file     Other Topics Concern    Not on file     Social History Narrative         ALLERGIES: Ciprofloxacin    Review of Systems   Constitutional: Positive for appetite change. Gastrointestinal: Positive for abdominal pain, diarrhea, nausea and vomiting. Negative for blood in stool. Neurological: Positive for weakness (generalized). All other systems reviewed and are negative. Vitals:    03/07/17 1526   BP: 156/57   Pulse: 85   Resp: 15   Temp: 97.8 °F (36.6 °C)   SpO2: 94%   Weight: 90.7 kg (200 lb)            Physical Exam   Constitutional: She is oriented to person, place, and time. She appears well-developed and well-nourished. No distress. HENT:   Head: Normocephalic and atraumatic. Eyes: EOM are normal. Pupils are equal, round, and reactive to light. Neck: Normal range of motion. Neck supple. Cardiovascular: Normal rate, regular rhythm, normal heart sounds and intact distal pulses. Exam reveals no friction rub. No murmur heard. Pulmonary/Chest: Effort normal and breath sounds normal. No respiratory distress. She has no wheezes. She has no rales. She exhibits no tenderness. Abdominal: Soft. Bowel sounds are normal. She exhibits no distension. There is tenderness. There is no rebound and no guarding.   + ruq ttp   Musculoskeletal: Normal range of motion. She exhibits no edema. Neurological: She is alert and oriented to person, place, and time. Coordination normal.   Skin: Skin is warm and dry. She is not diaphoretic. No pallor. Psychiatric: She has a normal mood and affect. Her behavior is normal.   Nursing note and vitals reviewed.        MDM  Number of Diagnoses or Management Options  CIARAN (acute kidney injury) ([facility] [address]):   Cholecystitis:   Leukocytosis, unspecified type:   Diagnosis management comments: ? Gallstones causing ruq pain vs PNA vs PE. Check US first check cdiff though doubt as diarrhea is only 3 times a day. More likely side effect of abx,        Amount and/or Complexity of Data Reviewed  Clinical lab tests: ordered and reviewed  Tests in the radiology section of CPT®: reviewed and ordered  Discuss the patient with other providers: yes (Hospitalist and surgery)  Independent visualization of images, tracings, or specimens: yes (ekg)    Patient Progress  Patient progress: stable    ED Course       Procedures     EKG interpretation: (Preliminary)  Rhythm: normal sinus rhythm; and regular . Rate (approx.): 70; Axis: normal; P wave: normal; QRS interval: normal ; ST/T wave: non-specific changes    PROGRESS NOTE:  5:14 PM  Waiting on Lactate. Will give 1 L of fluid. BP is 160, lactate pending. Will hold off on the 30 cc's per kilo due to age and underlying medical problems. Will reassess after 1500 of fluid. CONSULT NOTE:  5:31 PM Hakeem Albert MD spoke with Dr. Migue Bangura MD, Consult for Surgery. Discussed available diagnostic tests and clinical findings. He is in agreement with care plans as outlined. Dr. Migue Bangura MD will see the pt, wants hospitalist to admit. 5:33 PM  Patient is being admitted to the hospital.  The results of their tests and reasons for their admission have been discussed with them and/or available family. They convey agreement and understanding for the need to be admitted and for their admission diagnosis. Consultation will be made now with the inpatient physician for hospitalization. CONSULT NOTE:  5:45 Madelene Bumpers, MD spoke with Dr. Yolande Ragland, Consult for Hospitalist.  Discussed available diagnostic tests and clinical findings. He is in agreement with care plans as outlined. Dr. Yolande Ragland will see the pt. Given very high white count, while some may be from being taken off methotrexate? Will get ct of abdomen to look for colitis from cdiff.  If negative white count likely just from cholecystitis

## 2017-03-07 NOTE — H&P
[facility]  [address]  [phone]    Hospitalist Admission Note      NAME:  Lesli Stout   :   1933   MRN:  [id number]     PCP:  Shaun Key MD     Date/Time:  3/7/2017 5:55 PM          Subjective:     CHIEF COMPLAINT: nausea vomiting     HISTORY OF PRESENT ILLNESS:     Ms. Tex Jones is a 80 y.o.  female who presented to the Emergency Department complaining of N/V and diarrhea. Symptoms started a few days after discharge from here for URI and hypoxia. She does report months of post-prandial discomfort. She is more weak than when she left here. ER workup shows high WBC count, and US suggests acute cholecystitis,and labs show ARF on CKD. We will admit her for managment      Past Medical History:   Diagnosis Date    Acid reflux     Arthritis     Hx pulmonary embolism     Hypertension     IPF (idiopathic pulmonary fibrosis) (HCC)         Past Surgical History:   Procedure Laterality Date    HX HYSTERECTOMY      HX KNEE REPLACEMENT      right    HX LUMBAR FUSION      HX TONSILLECTOMY         Social History   Substance Use Topics    Smoking status: Never Smoker    Smokeless tobacco: Never Used    Alcohol use No        History reviewed. No pertinent family history. Allergies   Allergen Reactions    Ciprofloxacin Other (comments)     \"Makes her act weird\" per son        Prior to Admission medications    Medication Sig Start Date End Date Taking? Authorizing Provider   warfarin (COUMADIN) 2.5 mg tablet Take 5 mg by mouth daily. Yes Historical Provider   guaiFENesin ER (MUCINEX) 600 mg ER tablet Take 1 Tab by mouth every twelve (12) hours for 5 days. Can use over the counter forms 3/2/17 3/7/17 Yes Grady Frye MD   Nebulizer & Compressor machine 1 Each by Does Not Apply route three (3) times daily as needed. 3/2/17  Yes Grady Frye MD   amLODIPine (NORVASC) 5 mg tablet Take 1 Tab by mouth daily.  7/31/15 Yes Jani Martin MD   folic acid (FOLVITE) 1 mg tablet Take 1 Tab by mouth daily. 7/31/15  Yes Jani Martin MD   pantoprazole (PROTONIX) 40 mg tablet Take 40 mg by mouth daily. Yes Historical Provider   nebivolol (BYSTOLIC) 5 mg tablet Take 5 mg by mouth daily. Indications: hypertension   Yes John Monzon MD   predniSONE (DELTASONE) 5 mg tablet Take 5 mg by mouth daily. Yes John Monzon MD   inFLIXimab (REMICADE) 100 mg injection 5 mg/kg by IntraVENous route once. Every 6 weeks   Yes John Monzon MD   albuterol-ipratropium (DUO-NEB) 2.5 mg-0.5 mg/3 ml nebu 3 mL by Nebulization route every six (6) hours as needed. 3/2/17   China Munoz MD   ferrous sulfate 325 mg (65 mg iron) tablet Take 325 mg by mouth Daily (before breakfast).     Historical Provider       Review of Systems:  (bold if positive, if negative)    Gen:  , fatigueEyes:  ENT:  CVS:  Pulm:  Cough, dyspneaGI:  Abdominal pain, nausea, emesis, diarrhea  :    MS:  arthritisSkin:  Psych:  Endo:    Hem:  Renal:    Neuro:  weakness      Objective:      VITALS:    Vital signs reviewed; most recent are:    Visit Vitals    /57 (BP 1 Location: Left arm, BP Patient Position: At rest)    Pulse 85    Temp 97.8 °F (36.6 °C)    Resp 15    Wt 90.7 kg (200 lb)    SpO2 96%    BMI 32.28 kg/m2     SpO2 Readings from Last 6 Encounters:   03/07/17 96%   03/03/17 95%   11/18/15 93%   07/31/15 95%   05/21/13 99%    O2 Flow Rate (L/min): 2 l/min   No intake or output data in the 24 hours ending 03/07/17 5445     Exam:     Physical Exam:    Gen:  Obese, frail, in no acute distress  HEENT:  Pink conjunctivae, PERRL, hearing intact to voice, dry mucous membranes  Neck:  Supple, without masses, thyroid non-tender  Resp:  No accessory muscle use, clear breath sounds without wheezes rales or rhonchi  Card:  No murmurs, tachycardic S1, S2 without thrills, bruits or peripheral edema  Abd:  Soft, non-tender, non-distended, normoactive bowel sounds are present, no mass  Lymph:  No cervical or inguinal adenopathy  Musc:  No cyanosis or clubbing  Skin:  No rashes or ulcers, skin turgor is reduced  Neuro:  Cranial nerves are grossly intact, general motor weakness, follows commands   Psych:  Good insight, oriented to person, place and time, alert     Labs:    Recent Labs      03/07/17   1621   WBC  40.4*   HGB  11.9   HCT  38.3   PLT  380     Recent Labs      03/07/17   1621   NA  132*   K  3.9   CL  97   CO2  21   GLU  155*   BUN  31*   CREA  2.14*   CA  8.6   ALB  2.4*   TBILI  1.1*   SGOT  29   ALT  22     No results found for: GLUCPOC  No results for input(s): PH, PCO2, PO2, HCO3, FIO2 in the last 72 hours. Recent Labs      03/07/17   1622   INR  1.9*     All Micro Results     Procedure Component Value Units Date/Time    CULTURE, BLOOD, PAIRED [[id number]]     Order Status:  Sent Specimen:  Blood     C. DIFFICILE (DNA) [[id number]]     Order Status:  Sent           I have reviewed previous records       Assessment and Plan:      Acute cholecystitis / Leukocytosis / Nausea & vomiting / Diarrhea - POA, new. Surgery consulted. Due to ARF and INR, likley drain at first, consider surgery soon. NPO.  IVF, pain meds. ARF (acute renal failure) / Hyponatremia / CKD (chronic kidney disease) stage 3 - POA due to poor PO intake and GI loss. Hydrate and follow. Chronic respiratory failure with hypoxia / IPF (idiopathic pulmonary fibrosis) - Noted on recent CT chest. Recent consult from pulmonologist.  Would get pre-op risk assessment. Perhpas rheumatoid? MTX damage? Oxygen as needed. Prn duonebs    Rheumatoid arthritis -On low dose prednisone, holding methotrexate. With cholecystitis, with start stress steroids. Hx of deep venous thrombosis / Hx pulmonary embolism - On coumadin, but INR only 1.9. Hold coumadin in case surgery is needed.     Hypertension - Hold norvasc, bystolic    Weakness / Debilitated patient - After surgery will need PT/OT again    Anemia, unspecified - Will worsen with hydration.  Hold PO iron.      Esophageal reflux - Continue PPI    Telemetry reviewed:   normal sinus rhythm    Risk of deterioration: high      Total time spent with patient: 79 [facility] discussed with: Patient, Nursing Staff and >50% of time spent in counseling and coordination of care    Discussed:  Care Plan       ___________________________________________________    Attending Physician: Pato Hollins MD

## 2017-03-08 ENCOUNTER — ANESTHESIA EVENT (OUTPATIENT)
Dept: SURGERY | Age: 82
DRG: 853 | End: 2017-03-08
Payer: MEDICARE

## 2017-03-08 LAB
ALBUMIN SERPL BCP-MCNC: 2.1 G/DL (ref 3.5–5)
ALBUMIN/GLOB SERPL: 0.4 {RATIO} (ref 1.1–2.2)
ALP SERPL-CCNC: 87 U/L (ref 45–117)
ALT SERPL-CCNC: 18 U/L (ref 12–78)
ANION GAP BLD CALC-SCNC: 15 MMOL/L (ref 5–15)
APPEARANCE UR: ABNORMAL
AST SERPL W P-5'-P-CCNC: 28 U/L (ref 15–37)
ATRIAL RATE: 72 BPM
BACTERIA URNS QL MICRO: NEGATIVE /HPF
BASOPHILS # BLD AUTO: 0 K/UL (ref 0–0.1)
BASOPHILS # BLD: 0 % (ref 0–1)
BILIRUB SERPL-MCNC: 0.8 MG/DL (ref 0.2–1)
BILIRUB UR QL: NEGATIVE
BUN SERPL-MCNC: 42 MG/DL (ref 6–20)
BUN/CREAT SERPL: 19 (ref 12–20)
C DIFF TOX GENS STL QL NAA+PROBE: NEGATIVE
CALCIUM SERPL-MCNC: 7.9 MG/DL (ref 8.5–10.1)
CALCULATED P AXIS, ECG09: 31 DEGREES
CALCULATED R AXIS, ECG10: -18 DEGREES
CALCULATED T AXIS, ECG11: 21 DEGREES
CHLORIDE SERPL-SCNC: 104 MMOL/L (ref 97–108)
CO2 SERPL-SCNC: 19 MMOL/L (ref 21–32)
COLOR UR: ABNORMAL
CREAT SERPL-MCNC: 2.18 MG/DL (ref 0.55–1.02)
CREAT UR-MCNC: 127.85 MG/DL
DIAGNOSIS, 93000: NORMAL
DIFFERENTIAL METHOD BLD: ABNORMAL
EOSINOPHIL # BLD: 0 K/UL (ref 0–0.4)
EOSINOPHIL NFR BLD: 0 % (ref 0–7)
EPITH CASTS URNS QL MICRO: ABNORMAL /LPF
ERYTHROCYTE [DISTWIDTH] IN BLOOD BY AUTOMATED COUNT: 20.8 % (ref 11.5–14.5)
ERYTHROCYTE [DISTWIDTH] IN BLOOD BY AUTOMATED COUNT: 20.9 % (ref 11.5–14.5)
GLOBULIN SER CALC-MCNC: 5 G/DL (ref 2–4)
GLUCOSE SERPL-MCNC: 140 MG/DL (ref 65–100)
GLUCOSE UR STRIP.AUTO-MCNC: NEGATIVE MG/DL
HCT VFR BLD AUTO: 34.2 % (ref 35–47)
HCT VFR BLD AUTO: 38.3 % (ref 35–47)
HGB BLD-MCNC: 10.8 G/DL (ref 11.5–16)
HGB BLD-MCNC: 11.9 G/DL (ref 11.5–16)
HGB UR QL STRIP: ABNORMAL
INR PPP: 2.2 (ref 0.9–1.1)
KETONES UR QL STRIP.AUTO: NEGATIVE MG/DL
LEUKOCYTE ESTERASE UR QL STRIP.AUTO: NEGATIVE
LYMPHOCYTES # BLD AUTO: 5 % (ref 12–49)
LYMPHOCYTES # BLD: 2 K/UL (ref 0.8–3.5)
MAGNESIUM SERPL-MCNC: 2.5 MG/DL (ref 1.6–2.4)
MCH RBC QN AUTO: 24.3 PG (ref 26–34)
MCH RBC QN AUTO: 24.5 PG (ref 26–34)
MCHC RBC AUTO-ENTMCNC: 31.1 G/DL (ref 30–36.5)
MCHC RBC AUTO-ENTMCNC: 31.6 G/DL (ref 30–36.5)
MCV RBC AUTO: 77.6 FL (ref 80–99)
MCV RBC AUTO: 78.2 FL (ref 80–99)
MONOCYTES # BLD: 1.2 K/UL (ref 0–1)
MONOCYTES NFR BLD AUTO: 3 % (ref 5–13)
NEUTS SEG # BLD: 37.2 K/UL (ref 1.8–8)
NEUTS SEG NFR BLD AUTO: 92 % (ref 32–75)
NITRITE UR QL STRIP.AUTO: NEGATIVE
P-R INTERVAL, ECG05: 186 MS
PATH REV BLD -IMP: ABNORMAL
PH UR STRIP: 5 [PH] (ref 5–8)
PHOSPHATE SERPL-MCNC: 4.8 MG/DL (ref 2.6–4.7)
PLATELET # BLD AUTO: 336 K/UL (ref 150–400)
PLATELET # BLD AUTO: 380 K/UL (ref 150–400)
POTASSIUM SERPL-SCNC: 3.6 MMOL/L (ref 3.5–5.1)
PROT SERPL-MCNC: 7.1 G/DL (ref 6.4–8.2)
PROT UR STRIP-MCNC: 100 MG/DL
PROTHROMBIN TIME: 22.7 SEC (ref 9–11.1)
Q-T INTERVAL, ECG07: 424 MS
QRS DURATION, ECG06: 90 MS
QTC CALCULATION (BEZET), ECG08: 464 MS
RBC # BLD AUTO: 4.41 M/UL (ref 3.8–5.2)
RBC # BLD AUTO: 4.9 M/UL (ref 3.8–5.2)
RBC #/AREA URNS HPF: ABNORMAL /HPF (ref 0–5)
RBC MORPH BLD: ABNORMAL
SODIUM SERPL-SCNC: 138 MMOL/L (ref 136–145)
SODIUM UR-SCNC: 32 MMOL/L
SP GR UR REFRACTOMETRY: 1.02 (ref 1–1.03)
UA: UC IF INDICATED,UAUC: ABNORMAL
URATE CRY URNS QL MICRO: ABNORMAL
UROBILINOGEN UR QL STRIP.AUTO: 0.2 EU/DL (ref 0.2–1)
VENTRICULAR RATE, ECG03: 72 BPM
WBC # BLD AUTO: 31 K/UL (ref 3.6–11)
WBC # BLD AUTO: 40.4 K/UL (ref 3.6–11)
WBC URNS QL MICRO: ABNORMAL /HPF (ref 0–4)

## 2017-03-08 PROCEDURE — 82570 ASSAY OF URINE CREATININE: CPT | Performed by: INTERNAL MEDICINE

## 2017-03-08 PROCEDURE — 81001 URINALYSIS AUTO W/SCOPE: CPT | Performed by: INTERNAL MEDICINE

## 2017-03-08 PROCEDURE — 77030032490 HC SLV COMPR SCD KNE COVD -B

## 2017-03-08 PROCEDURE — 74011000250 HC RX REV CODE- 250: Performed by: INTERNAL MEDICINE

## 2017-03-08 PROCEDURE — 80053 COMPREHEN METABOLIC PANEL: CPT | Performed by: INTERNAL MEDICINE

## 2017-03-08 PROCEDURE — 74011000258 HC RX REV CODE- 258: Performed by: INTERNAL MEDICINE

## 2017-03-08 PROCEDURE — 65660000000 HC RM CCU STEPDOWN

## 2017-03-08 PROCEDURE — 87086 URINE CULTURE/COLONY COUNT: CPT | Performed by: INTERNAL MEDICINE

## 2017-03-08 PROCEDURE — 74011250636 HC RX REV CODE- 250/636: Performed by: INTERNAL MEDICINE

## 2017-03-08 PROCEDURE — 85027 COMPLETE CBC AUTOMATED: CPT | Performed by: INTERNAL MEDICINE

## 2017-03-08 PROCEDURE — 77030034848

## 2017-03-08 PROCEDURE — 84100 ASSAY OF PHOSPHORUS: CPT | Performed by: INTERNAL MEDICINE

## 2017-03-08 PROCEDURE — 83735 ASSAY OF MAGNESIUM: CPT | Performed by: INTERNAL MEDICINE

## 2017-03-08 PROCEDURE — 84300 ASSAY OF URINE SODIUM: CPT | Performed by: INTERNAL MEDICINE

## 2017-03-08 PROCEDURE — C9113 INJ PANTOPRAZOLE SODIUM, VIA: HCPCS | Performed by: INTERNAL MEDICINE

## 2017-03-08 PROCEDURE — 74011250636 HC RX REV CODE- 250/636

## 2017-03-08 PROCEDURE — 85610 PROTHROMBIN TIME: CPT | Performed by: INTERNAL MEDICINE

## 2017-03-08 PROCEDURE — 94010 BREATHING CAPACITY TEST: CPT

## 2017-03-08 PROCEDURE — 36415 COLL VENOUS BLD VENIPUNCTURE: CPT | Performed by: INTERNAL MEDICINE

## 2017-03-08 RX ORDER — HYDROCORTISONE SODIUM SUCCINATE 100 MG/2ML
100 INJECTION, POWDER, FOR SOLUTION INTRAMUSCULAR; INTRAVENOUS EVERY 8 HOURS
Status: DISCONTINUED | OUTPATIENT
Start: 2017-03-08 | End: 2017-03-10

## 2017-03-08 RX ORDER — MORPHINE SULFATE 2 MG/ML
2 INJECTION, SOLUTION INTRAMUSCULAR; INTRAVENOUS
Status: COMPLETED | OUTPATIENT
Start: 2017-03-08 | End: 2017-03-08

## 2017-03-08 RX ORDER — BISACODYL 5 MG
5 TABLET, DELAYED RELEASE (ENTERIC COATED) ORAL DAILY PRN
COMMUNITY
End: 2017-03-15

## 2017-03-08 RX ORDER — HYDROMORPHONE HYDROCHLORIDE 1 MG/ML
1 INJECTION, SOLUTION INTRAMUSCULAR; INTRAVENOUS; SUBCUTANEOUS
Status: DISCONTINUED | OUTPATIENT
Start: 2017-03-08 | End: 2017-03-10

## 2017-03-08 RX ORDER — ERGOCALCIFEROL 1.25 MG/1
50000 CAPSULE ORAL
COMMUNITY

## 2017-03-08 RX ORDER — MORPHINE SULFATE 2 MG/ML
2 INJECTION, SOLUTION INTRAMUSCULAR; INTRAVENOUS
Status: DISCONTINUED | OUTPATIENT
Start: 2017-03-08 | End: 2017-03-08

## 2017-03-08 RX ORDER — WARFARIN 2.5 MG/1
2.5 TABLET ORAL
COMMUNITY

## 2017-03-08 RX ORDER — HYDROMORPHONE HYDROCHLORIDE 1 MG/ML
INJECTION, SOLUTION INTRAMUSCULAR; INTRAVENOUS; SUBCUTANEOUS
Status: COMPLETED
Start: 2017-03-08 | End: 2017-03-08

## 2017-03-08 RX ADMIN — PIPERACILLIN SODIUM,TAZOBACTAM SODIUM 3.38 G: 3; .375 INJECTION, POWDER, FOR SOLUTION INTRAVENOUS at 00:02

## 2017-03-08 RX ADMIN — PIPERACILLIN SODIUM,TAZOBACTAM SODIUM 3.38 G: 3; .375 INJECTION, POWDER, FOR SOLUTION INTRAVENOUS at 09:00

## 2017-03-08 RX ADMIN — HYDROCORTISONE SODIUM SUCCINATE 100 MG: 100 INJECTION, POWDER, FOR SOLUTION INTRAMUSCULAR; INTRAVENOUS at 05:45

## 2017-03-08 RX ADMIN — HYDROCORTISONE SODIUM SUCCINATE 100 MG: 100 INJECTION, POWDER, FOR SOLUTION INTRAMUSCULAR; INTRAVENOUS at 12:28

## 2017-03-08 RX ADMIN — SODIUM CHLORIDE 100 ML/HR: 900 INJECTION, SOLUTION INTRAVENOUS at 22:38

## 2017-03-08 RX ADMIN — PIPERACILLIN SODIUM,TAZOBACTAM SODIUM 3.38 G: 3; .375 INJECTION, POWDER, FOR SOLUTION INTRAVENOUS at 17:23

## 2017-03-08 RX ADMIN — HYDROMORPHONE HYDROCHLORIDE 1 MG: 1 INJECTION, SOLUTION INTRAMUSCULAR; INTRAVENOUS; SUBCUTANEOUS at 05:31

## 2017-03-08 RX ADMIN — Medication 2 MG: at 04:27

## 2017-03-08 RX ADMIN — SODIUM CHLORIDE 40 MG: 9 INJECTION INTRAMUSCULAR; INTRAVENOUS; SUBCUTANEOUS at 09:00

## 2017-03-08 RX ADMIN — Medication 1 MG: at 03:42

## 2017-03-08 RX ADMIN — SODIUM CHLORIDE 100 ML/HR: 900 INJECTION, SOLUTION INTRAVENOUS at 12:28

## 2017-03-08 RX ADMIN — PHYTONADIONE 10 MG: 10 INJECTION, EMULSION INTRAMUSCULAR; INTRAVENOUS; SUBCUTANEOUS at 16:40

## 2017-03-08 RX ADMIN — SODIUM CHLORIDE 40 MG: 9 INJECTION INTRAMUSCULAR; INTRAVENOUS; SUBCUTANEOUS at 22:39

## 2017-03-08 RX ADMIN — HYDROCORTISONE SODIUM SUCCINATE 100 MG: 100 INJECTION, POWDER, FOR SOLUTION INTRAMUSCULAR; INTRAVENOUS at 22:38

## 2017-03-08 NOTE — PROGRESS NOTES
Rounded on [employer] patients and provided Anointing of the Sick at request of family.     Michel King

## 2017-03-08 NOTE — ED NOTES
TRANSFER - OUT REPORT:    Verbal report given to Drew Russell RN (name) on Tamika Medina  being transferred to room 1439 [id number] (unit) for routine progression of care       Report consisted of patients Situation, Background, Assessment and   Recommendations(SBAR). Information from the following report(s) SBAR, Kardex, ED Summary, [facility] and Recent Results was reviewed with the receiving nurse. Lines:   Peripheral IV 03/07/17 Left Wrist (Active)   Site Assessment Clean, dry, & intact 3/7/2017  3:29 PM   Phlebitis Assessment 0 3/7/2017  3:29 PM   Infiltration Assessment 0 3/7/2017  3:29 PM   Dressing Status Clean, dry, & intact 3/7/2017  3:29 PM   Dressing Type Transparent 3/7/2017  3:29 PM        Opportunity for questions and clarification was provided.       Patient transported with:   O2 @ 2 liters

## 2017-03-08 NOTE — PROGRESS NOTES
Pt is a readmission with a RRAT score of 28 making pt high risk for another readmission in the future. Pt was recently discharged home from the surgical floor on 3/3/17. She was readmitted on 3/7/17. Address and phone numbers on demographics are correct. Pt resides with her son. There are 3 entry steps into their home. Once inside,there is a chair lift to the second floor. Pt.'s bedroom is on the second floor. At home,pt also has a rolling walker & wheelchair. Pt has a personal care aide who assists pt twice/week with bathing and light housekeeping duties. Pt does not drive. Son transports pt to her appointments. Pt is on continuous home oxygen 2 liters nc serviced through [employer]. She also has a home nebulizer. Home Health is set up for pt through [facility]. Clinicals faxed to [facility] for resumption of home health services when discharged. In talking with pt ,she could not remember if home health had been out to see her or what she may want or need in terms of discharge planning. .She deferred me to talk with her son,Fabián who stepped off the unit. When son returns to the unit,please notify me so I can meet with son also. Home number for son is [phone]. As pt is a readmission with a RRAT score of 28(making her high risk for a future readmission),please consider a Palliative Medicine consult. Nursing also informed me that pt @ home was a DNR and is interested in obtaining a durable DNR(DDNR). Thank you. Alexandre FormanRN    Addendum-If in the future,if pt chooses to transition into home [address] [place] [address] [address] now has a hospice and can help pt transition into this service when needed. Thank you.   Aditya Galvan

## 2017-03-08 NOTE — CONSULTS
[id number]  YOB: 1933     Assessment & Plan:   1. CIARAN  · Due to IVVD  · Get PVR  · IVF  · UA: No blood , no protein  2. CKD 3  · CR 1.3 to 1.4 mg%  3. Acute Cholecystitis  4. HTN  · Not on ELLIS inhibition  5. GERD  6. RA              Subjective:   CHIEF COMPLAIN:arf  HPI:  Ms. Adolph Zuñiga is a 80 y.o.  female who presented to the Emergency Department complaining of N/V and diarrhea. Symptoms started a few days after discharge from here for URI and hypoxia. Resp culture: RSV. She has lost appetite. She took 1 Advil. Not on ELLIS agents. Not eating well. No urinary complaints except she has noted decreased urine out put. She does report months of post-prandial discomfort. She is more weak than when she left here. ER workup shows high WBC count, and US suggests acute cholecystitis. She has ILD and thought to be due to RA. Review of Systems  A comprehensive review of systems was negative except for that written in the HPI. Past Medical History:   Diagnosis Date    Acid reflux     Arthritis     Hx pulmonary embolism     Hypertension     IPF (idiopathic pulmonary fibrosis) (HCC)       Past Surgical History:   Procedure Laterality Date    HX HYSTERECTOMY      HX KNEE REPLACEMENT      right    HX LUMBAR FUSION      HX TONSILLECTOMY         Social History     Social History    Marital status:      Spouse name: N/A    Number of children: N/A    Years of education: N/A     Occupational History    Not on file. Social History Main Topics    Smoking status: Never Smoker    Smokeless tobacco: Never Used    Alcohol use No    Drug use: No    Sexual activity: Not on file     Other Topics Concern    Not on file     Social History Narrative      History reviewed. No pertinent family history. Prior to Admission medications    Medication Sig Start Date End Date Taking?  Authorizing Provider   warfarin (COUMADIN) 2.5 mg tablet Take 2.5 mg by mouth five (5) days a week. Takes 2.5mg on , Tuesday, Wednesday, Friday, Saturday   Yes Historical Provider   ergocalciferol (ERGOCALCIFEROL) 50,000 unit capsule Take 50,000 Units by mouth every thirty (30) days. Yes Historical Provider   bisacodyl (DULCOLAX, BISACODYL,) 5 mg EC tablet Take 5 mg by mouth daily as needed for Constipation. Yes Historical Provider   mirabegron ER (MYRBETRIQ) 50 mg ER tablet Take 50 mg by mouth daily. Yes Historical Provider   warfarin (COUMADIN) 2.5 mg tablet Take 5 mg by mouth every Monday and Thursday. Yes Historical Provider   amLODIPine (NORVASC) 5 mg tablet Take 1 Tab by mouth daily. 7/31/15  Yes Gianfranco Garcia MD   folic acid (FOLVITE) 1 mg tablet Take 1 Tab by mouth daily. 7/31/15  Yes Gianfranco Garcia MD   pantoprazole (PROTONIX) 40 mg tablet Take 40 mg by mouth daily. Yes Historical Provider   nebivolol (BYSTOLIC) 5 mg tablet Take 5 mg by mouth daily. Indications: hypertension   Yes John Monzon MD   predniSONE (DELTASONE) 5 mg tablet Take 5 mg by mouth daily. Yes John Monzon MD   inFLIXimab (REMICADE) 100 mg injection 5 mg/kg by IntraVENous route once. Every 6 weeks   Yes John Monzon MD   albuterol-ipratropium (DUO-NEB) 2.5 mg-0.5 mg/3 ml nebu 3 mL by Nebulization route every six (6) hours as needed. 3/2/17   Dylon Vásquez MD     Allergies   Allergen Reactions    Ciprofloxacin Other (comments)     \"Makes her act weird\" per son       Objective:     Vitals:  Blood pressure 121/79, pulse 69, temperature 97.9 °F (36.6 °C), resp. rate 12, height 5' 4\" (1.626 m), weight 86.1 kg (189 lb 13.1 oz), SpO2 96 %, not currently breastfeeding.   Temp (24hrs), Av.1 °F (36.7 °C), Min:97.8 °F (36.6 °C), Max:98.5 °F (36.9 °C)      Intake and Output:     1901 -  0700  In: 1100 [I.V.:1100]  Out: 0     Physical Exam:                Patient is intubated:  no    Physical Examination:   GENERAL ASSESSMENT: cachetic  SKIN: dry skin  HEAD: normocephalic  EYES: normal eyes  NECK: normal  CHEST: rales BASAL  HEART: regular rate and rhythm, normal S1/S2  ABDOMEN: soft, non-distended, no masses  :Arroyo: no  EXTREMITY: deformity ra  NEURO: gross motor exam normal by observation      ECG/rhythm[de-identified] Rev:yes  Xray/CT/US/MRI REV:yes  Data Review   Recent Results (from the past 72 hour(s))   CBC WITH AUTOMATED DIFF    Collection Time: 03/07/17  4:21 PM   Result Value Ref Range    WBC 40.4 (H) 3.6 - 11.0 K/uL    RBC 4.90 3.80 - 5.20 M/uL    HGB 11.9 11.5 - 16.0 g/dL    HCT 38.3 35.0 - 47.0 %    MCV 78.2 (L) 80.0 - 99.0 FL    MCH 24.3 (L) 26.0 - 34.0 PG    MCHC 31.1 30.0 - 36.5 g/dL    RDW 20.9 (H) 11.5 - 14.5 %    PLATELET 012 491 - 608 K/uL    NEUTROPHILS 92 (H) 32 - 75 %    LYMPHOCYTES 5 (L) 12 - 49 %    MONOCYTES 3 (L) 5 - 13 %    EOSINOPHILS 0 0 - 7 %    BASOPHILS 0 0 - 1 %    ABS. NEUTROPHILS 37.2 (H) 1.8 - 8.0 K/UL    ABS. LYMPHOCYTES 2.0 0.8 - 3.5 K/UL    ABS. MONOCYTES 1.2 (H) 0.0 - 1.0 K/UL    ABS. EOSINOPHILS 0.0 0.0 - 0.4 K/UL    ABS. BASOPHILS 0.0 0.0 - 0.1 K/UL    DF MANUAL      RBC COMMENTS ANISOCYTOSIS  2+        RBC COMMENTS OVALOCYTES  PRESENT        RBC COMMENTS MIMI CELLS  PRESENT        RBC COMMENTS HYPOCHROMIA  1+       METABOLIC PANEL, COMPREHENSIVE    Collection Time: 03/07/17  4:21 PM   Result Value Ref Range    Sodium 132 (L) 136 - 145 mmol/L    Potassium 3.9 3.5 - 5.1 mmol/L    Chloride 97 97 - 108 mmol/L    CO2 21 21 - 32 mmol/L    Anion gap 14 5 - 15 mmol/L    Glucose 155 (H) 65 - 100 mg/dL    BUN 31 (H) 6 - 20 MG/DL    Creatinine 2.14 (H) 0.55 - 1.02 MG/DL    BUN/Creatinine ratio 14 12 - 20      GFR est AA 27 (L) >60 ml/min/1.73m2    GFR est non-AA 22 (L) >60 ml/min/1.73m2    Calcium 8.6 8.5 - 10.1 MG/DL    Bilirubin, total 1.1 (H) 0.2 - 1.0 MG/DL    ALT (SGPT) 22 12 - 78 U/L    AST (SGOT) 29 15 - 37 U/L    Alk.  phosphatase 91 45 - 117 U/L    Protein, total 7.9 6.4 - 8.2 g/dL    Albumin 2.4 (L) 3.5 - 5.0 g/dL    Globulin 5.5 (H) 2.0 - 4.0 g/dL    A-G Ratio 0.4 (L) 1.1 - 2.2     LIPASE    Collection Time: 03/07/17  4:21 PM   Result Value Ref Range    Lipase 119 73 - 393 U/L   NUCLEATED RBC    Collection Time: 03/07/17  4:21 PM   Result Value Ref Range    NRBC 0.0 0  WBC    ABSOLUTE NRBC 0.00 0.00 - 0.01 K/uL   PROTHROMBIN TIME + INR    Collection Time: 03/07/17  4:22 PM   Result Value Ref Range    INR 1.9 (H) 0.9 - 1.1      Prothrombin time 19.8 (H) 9.0 - 11.1 sec   EKG, 12 LEAD, INITIAL    Collection Time: 03/07/17  4:42 PM   Result Value Ref Range    Ventricular Rate 72 BPM    Atrial Rate 72 BPM    P-R Interval 186 ms    QRS Duration 90 ms    Q-T Interval 424 ms    QTC Calculation (Bezet) 464 ms    Calculated P Axis 31 degrees    Calculated R Axis -18 degrees    Calculated T Axis 21 degrees    Diagnosis       Sinus rhythm with premature supraventricular complexes and with occasional   premature ventricular complexes  Moderate voltage criteria for LVH, may be normal variant  Inferior infarct (cited on or before 01-MAR-2017)  Abnormal ECG  When compared with ECG of 01-MAR-2017 22:34,  Sinus rhythm has replaced Atrial fibrillation     LACTIC ACID, PLASMA    Collection Time: 03/07/17  5:38 PM   Result Value Ref Range    Lactic acid 1.5 0.4 - 2.0 MMOL/L   CBC W/O DIFF    Collection Time: 03/08/17  5:29 AM   Result Value Ref Range    WBC 31.0 (H) 3.6 - 11.0 K/uL    RBC 4.41 3.80 - 5.20 M/uL    HGB 10.8 (L) 11.5 - 16.0 g/dL    HCT 34.2 (L) 35.0 - 47.0 %    MCV 77.6 (L) 80.0 - 99.0 FL    MCH 24.5 (L) 26.0 - 34.0 PG    MCHC 31.6 30.0 - 36.5 g/dL    RDW 20.8 (H) 11.5 - 14.5 %    PLATELET 593 718 - 485 K/uL   MAGNESIUM    Collection Time: 03/08/17  5:29 AM   Result Value Ref Range    Magnesium 2.5 (H) 1.6 - 2.4 mg/dL   METABOLIC PANEL, COMPREHENSIVE    Collection Time: 03/08/17  5:29 AM   Result Value Ref Range    Sodium 138 136 - 145 mmol/L    Potassium 3.6 3.5 - 5.1 mmol/L    Chloride 104 97 - 108 mmol/L    CO2 19 (L) 21 - 32 mmol/L    Anion gap 15 5 - 15 mmol/L    Glucose 140 (H) 65 - 100 mg/dL    BUN 42 (H) 6 - 20 MG/DL    Creatinine 2.18 (H) 0.55 - 1.02 MG/DL    BUN/Creatinine ratio 19 12 - 20      GFR est AA 26 (L) >60 ml/min/1.73m2    GFR est non-AA 22 (L) >60 ml/min/1.73m2    Calcium 7.9 (L) 8.5 - 10.1 MG/DL    Bilirubin, total 0.8 0.2 - 1.0 MG/DL    ALT (SGPT) 18 12 - 78 U/L    AST (SGOT) 28 15 - 37 U/L    Alk. phosphatase 87 45 - 117 U/L    Protein, total 7.1 6.4 - 8.2 g/dL    Albumin 2.1 (L) 3.5 - 5.0 g/dL    Globulin 5.0 (H) 2.0 - 4.0 g/dL    A-G Ratio 0.4 (L) 1.1 - 2.2     PHOSPHORUS    Collection Time: 03/08/17  5:29 AM   Result Value Ref Range    Phosphorus 4.8 (H) 2.6 - 4.7 MG/DL   PROTHROMBIN TIME + INR    Collection Time: 03/08/17  5:29 AM   Result Value Ref Range    INR 2.2 (H) 0.9 - 1.1      Prothrombin time 22.7 (H) 9.0 - 11.1 sec       Discussed with:    Patient and Family  Thank you so much to allow us to participate in this patient's care. We will follow.  : Kae Alexander MD  3/8/2017      [facility]:  [email or web address]  Bruna Bey office:  [address]  [place], [address] [facility]  Phone: [phone]  Fax :     [phone]    [facility] office:  [address] [facility]Mer[facility]  Phone - [phone]  Fax - [phone]

## 2017-03-08 NOTE — ED NOTES
Orders received to collect blood cultures and lactic acid at this time. Zosyn infusion held until cultures collected. Dr. Chhaya Osborne aware.

## 2017-03-08 NOTE — PROGRESS NOTES
Garcia Dudley tammy [address] 79  [address] [facility], [address] [place] [address]  [phone]      Medical Progress Note      NAME: Melissa Cook   :  1933  MRM:  [id number]    Date/Time: 3/8/2017          Subjective:     Chief Complaint:  F/u acute cholecystitis    Chart/notes/labs/studies reviewed, patient examined at bedside. RUQ pain, intermittent, sharp. No fevers. Had nausea now better          Objective:       Vitals:          Last 24hrs VS reviewed since prior progress note. Most recent are:    Visit Vitals    /59    Pulse 68    Temp 97.9 °F (36.6 °C)    Resp 22    Ht 5' 4\" (1.626 m)    Wt 86.1 kg (189 lb 13.1 oz)    SpO2 99%    Breastfeeding No    BMI 32.58 kg/m2     SpO2 Readings from Last 6 Encounters:   17 99%   17 95%   11/18/15 93%   07/31/15 95%   13 99%    O2 Flow Rate (L/min): 2 l/min     Intake/Output Summary (Last 24 hours) at 17 1611  Last data filed at 17 1200   Gross per 24 hour   Intake             1200 ml   Output             1125 ml   Net               75 ml          Exam:     Physical Exam:    Gen:  Elderly, well-developed, well-nourished, in no acute distress but uncomfortable appearing. Son at bedside  HEENT:  Sclerae nonicteric, hearing intact to voice, mucous membranes moist  Neck:  Supple, without masses. Resp:  No accessory muscle use, mildly diminished othewise CTAB without wheezes, rales, or rhonchi  Card: RRR, without m/r/g. No LE edema. Abd:  +bowel sounds, soft, NTTP, nondistended. Neuro: Face symmetric, tongue midline, speech fluent, follows commands appropriately  Psych:  Alert, oriented x 3.  Good insight     Medications Reviewed: (see below)    Lab Data Reviewed: (see below)    ______________________________________________________________________    Medications:     Current Facility-Administered Medications   Medication Dose Route Frequency    HYDROmorphone (PF) (DILAUDID) injection 1 mg  1 mg IntraVENous Q4H PRN    phytonadione (vitamin K1) (AQUA-MEPHYTON) 10 mg in 0.9% sodium chloride 50 mL IVPB  10 mg IntraVENous ONCE    hydrocortisone Sod Succ (PF) (SOLU-CORTEF) injection 100 mg  100 mg IntraVENous Q6H    pantoprazole (PROTONIX) 40 mg in sodium chloride 0.9 % 10 mL injection  40 mg IntraVENous Q12H    naloxone (NARCAN) injection 0.4 mg  0.4 mg IntraVENous PRN    0.9% sodium chloride infusion  100 mL/hr IntraVENous CONTINUOUS    acetaminophen (TYLENOL) tablet 650 mg  650 mg Oral Q4H PRN    diphenhydrAMINE (BENADRYL) injection 12.5 mg  12.5 mg IntraVENous Q4H PRN    ondansetron (ZOFRAN) injection 4 mg  4 mg IntraVENous Q4H PRN    piperacillin-tazobactam (ZOSYN) 3.375 g in 0.9% sodium chloride (MBP/ADV) 100 mL  3.375 g IntraVENous Q8H    albuterol-ipratropium (DUO-NEB) 2.5 MG-0.5 MG/3 ML  3 mL Nebulization Q6H PRN            Lab Review:     Recent Labs      03/08/17   0529 03/07/17   1621   WBC  31.0*  40.4*   HGB  10.8*  11.9   HCT  34.2*  38.3   PLT  336  380     Recent Labs      03/08/17   0529 03/07/17   1622  03/07/17   1621   NA  138   --   132*   K  3.6   --   3.9   CL  104   --   97   CO2  19*   --   21   GLU  140*   --   155*   BUN  42*   --   31*   CREA  2.18*   --   2.14*   CA  7.9*   --   8.6   MG  2.5*   --    --    PHOS  4.8*   --    --    ALB  2.1*   --   2.4*   SGOT  28   --   29   ALT  18   --   22   INR  2.2*  1.9*   --      No components found for: GLPOC  No results for input(s): PH, PCO2, PO2, HCO3, FIO2 in the last 72 hours.   Recent Labs      03/08/17   0529 03/07/17   1622   INR  2.2*  1.9*     No results found for: SDES  Lab Results   Component Value Date/Time    Culture result: NO GROWTH 5 DAYS 03/01/2017 10:45 PM    Culture result: NO SIGNIFICANT GROWTH 11/18/2015 07:00 PM            Assessment / Plan:     81 yo WF w/ hx of IPF, HTN, DVT, recent admission for bronchitis p/w abdominal pain, found to have acute cholecystitis    Acute cholecystitis / Leukocytosis / Nausea & vomiting / Diarrhea: C. Diff negative  -- lap guillermina ASAP, gen surgery on board  -- cont IV pip/tazo     ARF (acute renal failure) / Hyponatremia / CKD (chronic kidney disease) stage 3   -- monitor on IVF. Nephrology to follow as well     Chronic respiratory failure with hypoxia / IPF (idiopathic pulmonary fibrosis) : Noted on recent CT chest. Recent consult from pulmonologist. Perhpas rheumatoid vs MTX damage? Consulted for pre-op risk assessment. Discussed with Dr. Jerome Pineda. -- cont supplemental O2 and duo-nebs PRN     Rheumatoid arthritis: On low dose prednisone, holding methotrexate. With cholecystitis, and concerns of AI, stress steroids were started  -- change hydrocortisone to 100mg Q8H     Hx of deep venous thrombosis / Hx pulmonary embolism:  On coumadin, INR 2.2.   -- Holding coumadin, can bridge with heparin IV if needed    Hypertension  -- Hold Norvasc, Bystolic     Weakness / Debilitated patient  --  After surgery will need PT/OT again     Anemia, unspecified  -- monitor Hg      Esophageal reflux  --  Continue PPI      Total time spent with patient: *45 minutes, d/w pulm and son at [address] discussed with: Patient, Nursing Staff and >50% of time spent in counseling and coordination of care    Discussed:  Care Plan    Prophylaxis:  Coumadin    Disposition:  SNF/LTC           ___________________________________________________    Attending Physician: Faith Nieves MD

## 2017-03-08 NOTE — CONSULTS
Name: [facility]: [address]   : 1933 Admit Date: 3/7/2017   Phone: [phone]  Room: [facility]/   PCP: Blaine Pallas, MD  MRN: [id number]   Date: 3/8/2017  Code: Full Code        HPI:    Chart and notes reviewed. Data reviewed. I review the patient's current medications in the medical record at each encounter. I have evaluated and examined the patient. 9:49 AM       History was obtained from patient. I was asked by Janice Gardiner MD to see nAdrew Marcos in consultation for a chief complaint of surgical clearance. Ms. Aida Gutiérrez is a pleasant 80year old female who presented to the [facility] ED yesterday afternoon with N/V/D that began a couple days following discharge after recent admission here for acute bronchitis; viral panel was positive for RSV. Abd US this admission with findings consistent with acute cholecystitis. Patient has history of ILD which may be secondary to RA. States it was diagnosed ~ 4 years ago in [place]. She does not recall the name of her pulmonologist. She denies having lung biopsy. Recent chest CT here last week with stable appearance of ILD, not significantly changed since . She denies use of home O2, inhalers, or nebs. She is a never smoker and denies second hand smoke exposure. 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 She has history of rheumatoid arthritis for which she takes methotrexate, Remicade, and prednisone. She was evaluated by Dr. Demi Mclean from our group in  and it was recommended that she stop methotrexate at that time. This morning, patient reports pain control to be adequate. Denies CP. Reports her SOB/LAND to be about at her baseline. Denies fever or chills. Denies LE pain/swelling. CT abd/pelvis personally visualized. Lungs cuts with Exeensive bibasilar subpleural linear densities consistent with pulmonary fibrosis.   There is substantial gallbladder distention and numerous faceted gallstones. Pericholecystic inflammatory changes especially around the gallbladder fundus extending to margins of colonic hepatic flexure with thickening of anterior pararenal fascia. WBC 31.0 (down from 40.4 yesterday)  Hgb 10.8  Creat 2.18 - up slightly from yesterday and worse than baseline  Lactic acid 1.5  LFTs unremarkable    Past Medical History:   Diagnosis Date    Acid reflux     Arthritis     Hx pulmonary embolism     Hypertension     IPF (idiopathic pulmonary fibrosis) (HCC)        Past Surgical History:   Procedure Laterality Date    HX HYSTERECTOMY      HX KNEE REPLACEMENT      right    HX LUMBAR FUSION      HX TONSILLECTOMY         History reviewed. No pertinent family history. Social History   Substance Use Topics    Smoking status: Never Smoker    Smokeless tobacco: Never Used    Alcohol use No       Allergies   Allergen Reactions    Ciprofloxacin Other (comments)     \"Makes her act weird\" per son       Current Facility-Administered Medications   Medication Dose Route Frequency    HYDROmorphone (PF) (DILAUDID) injection 1 mg  1 mg IntraVENous Q4H PRN    hydrocortisone Sod Succ (PF) (SOLU-CORTEF) injection 100 mg  100 mg IntraVENous Q6H    pantoprazole (PROTONIX) 40 mg in sodium chloride 0.9 % 10 mL injection  40 mg IntraVENous Q12H    naloxone (NARCAN) injection 0.4 mg  0.4 mg IntraVENous PRN    0.9% sodium chloride infusion  100 mL/hr IntraVENous CONTINUOUS    acetaminophen (TYLENOL) tablet 650 mg  650 mg Oral Q4H PRN    diphenhydrAMINE (BENADRYL) injection 12.5 mg  12.5 mg IntraVENous Q4H PRN    ondansetron (ZOFRAN) injection 4 mg  4 mg IntraVENous Q4H PRN    piperacillin-tazobactam (ZOSYN) 3.375 g in 0.9% sodium chloride (MBP/ADV) 100 mL  3.375 g IntraVENous Q8H    albuterol-ipratropium (DUO-NEB) 2.5 MG-0.5 MG/3 ML  3 mL Nebulization Q6H PRN         REVIEW OF SYSTEMS   Negative except as stated in the HPI.       Physical Exam:   Visit Vitals    /79    Pulse 69  Temp 97.9 °F (36.6 °C)    Resp 12    Ht 5' 4\" (1.626 m)    Wt 86.1 kg (189 lb 13.1 oz)    SpO2 96%    Breastfeeding No    BMI 32.58 kg/m2       General:  Alert, cooperative, no distress, appears stated age. Head:  Normocephalic, without obvious abnormality, atraumatic. Eyes:  Conjunctivae/corneas clear. Nose: Nares normal. Septum midline. Mucosa normal.    Throat: Lips, mucosa, and tongue normal.    Neck: Supple, symmetrical, trachea midline, no adenopathy. Lungs:   Fine inspiratory crackles in the bilaterally. Chest wall:  No tenderness or deformity. Heart:  Regular rate and rhythm, S1, S2 normal, no murmur, click, rub or gallop. Abdomen:   Soft, non-tender, non-distended. Bowel sounds normal. No masses,  No organomegaly. Extremities: Extremities normal, atraumatic, no cyanosis or edema. Pulses: 2+ and symmetric all extremities. Skin: Skin color, texture, turgor normal. No rashes or lesions   Lymph nodes: Cervical, supraclavicular nodes normal.   Neurologic: Grossly nonfocal       Lab Results   Component Value Date/Time    Sodium 138 03/08/2017 05:29 AM    Potassium 3.6 03/08/2017 05:29 AM    Chloride 104 03/08/2017 05:29 AM    CO2 19 03/08/2017 05:29 AM    BUN 42 03/08/2017 05:29 AM    Creatinine 2.18 03/08/2017 05:29 AM    Glucose 140 03/08/2017 05:29 AM    Calcium 7.9 03/08/2017 05:29 AM    Magnesium 2.5 03/08/2017 05:29 AM    Phosphorus 4.8 03/08/2017 05:29 AM    Lactic acid 1.5 03/07/2017 05:38 PM       Lab Results   Component Value Date/Time    WBC 31.0 03/08/2017 05:29 AM    HGB 10.8 03/08/2017 05:29 AM    PLATELET 615 38/98/1287 05:29 AM    MCV 77.6 03/08/2017 05:29 AM       Lab Results   Component Value Date/Time    INR 2.2 03/08/2017 05:29 AM    aPTT 26.9 03/01/2017 10:42 PM    AST (SGOT) 28 03/08/2017 05:29 AM    Alk.  phosphatase 87 03/08/2017 05:29 AM    Protein, total 7.1 03/08/2017 05:29 AM    Albumin 2.1 03/08/2017 05:29 AM    Globulin 5.0 03/08/2017 05:29 AM       Lab Results   Component Value Date/Time    Iron 144 07/29/2015 02:40 AM    TIBC 307 07/29/2015 02:40 AM    Iron % saturation 47 07/29/2015 02:40 AM    Ferritin 232 07/29/2015 02:40 AM       Lab Results   Component Value Date/Time    Sed rate (ESR) 80 05/20/2013 05:00 AM        No results found for: PH, PHI, PCO2, PCO2I, PO2, PO2I, HCO3, HCO3I, FIO2, FIO2I    Lab Results   Component Value Date/Time    CK 57 11/18/2015 05:10 PM    CK-MB Index 2.6 11/18/2015 05:10 PM    Troponin-I, Qt. <0.04 03/01/2017 10:42 PM        Lab Results   Component Value Date/Time    Culture result: NO GROWTH 5 DAYS 03/01/2017 10:45 PM    Culture result: NO SIGNIFICANT GROWTH 11/18/2015 07:00 PM       No results found for: TOXA1, RPR, HBCM, HBSAG, HAAB, HCAB, HCAB1, HAAT, G6PD, CRYAC, HIVGT, HIVR, HIV1, HIV12, HIVPC, HIVRPI    Lab Results   Component Value Date/Time    CK 57 11/18/2015 05:10 PM    CK 46 05/18/2013 12:50 PM       Lab Results   Component Value Date/Time    Color YELLOW/STRAW 03/02/2017 10:30 AM    Appearance CLEAR 03/02/2017 10:30 AM    Specific gravity 1.020 05/18/2013 02:10 PM    pH (UA) 5.5 03/02/2017 10:30 AM    Protein NEGATIVE  03/02/2017 10:30 AM    Glucose NEGATIVE  03/02/2017 10:30 AM    Ketone NEGATIVE  03/02/2017 10:30 AM    Bilirubin NEGATIVE  03/02/2017 10:30 AM    Blood SMALL 03/02/2017 10:30 AM    Urobilinogen 0.2 03/02/2017 10:30 AM    Nitrites NEGATIVE  03/02/2017 10:30 AM    Leukocyte Esterase NEGATIVE  03/02/2017 10:30 AM    WBC 0-4 03/02/2017 10:30 AM    RBC 0-5 03/02/2017 10:30 AM    Bacteria NEGATIVE  03/02/2017 10:30 AM       IMPRESSION  · Acute cholecystitis   · Acute/chronic renal failure  · ILD: stable; may be secondary to RA   · Anemia with history of iron deficiency   · Hx of PE/DVT on Coumadin at baseline   · RA   · GERD   · HTN      PLAN  · IVFs/abx/stress dose steroids per primary team/surgery  · O2 if needed to keep sats > 90%; 2L O2 at baseline as needed with activity  · Prn Duonebs  · Surgery to evaluate  · Check PFTs  · Renal has been consulted  · Patient has been advised to stop methotrexate in the past as it may be contributing to her ILD. She has elected to continue taking it. Her ILD appears stable at this time. · Patient would benefit from outpatient pulmonary follow up. · GI prophylaxis: Protonix  · DVT prophylaxis: Coumadin    Patient presents with moderate risk for surgery given stable ILD and minimal O2 requirement. Further recs pending results of PFTs    Thank you for allowing us to participate in the care of this patient. We will be happy to follow along in her progress with you.     Nithin Houston

## 2017-03-08 NOTE — ED NOTES
2 nurses attempted to get paired blood cultures. Successfully got 1 set of blood cultures and was unsuccessful with second set.   Dr. Shannan Rosenbaum made aware

## 2017-03-08 NOTE — PROGRESS NOTES
I met with pt.'s son,Fabián to discuss discharge options. He stated that Elvira Guzman 's RN was out for two visits plus PT & OT but family discovered that even with additional resources family was not able to manage pt @ home due to her debility and increased weakness. Son was asking about different choices for SNFs near him as he prefers pt to go to SNF. Donald Hodges He resides in [place] . We discussed the [facility] and [facility]. Son states his brother will be driving down tomorrow from [place] who shares POA with him and Lester Jacobs would like to discuss choices with his brother also. Freedom of choices for SNFs left with pt.'s son. I will follow-up tomorrow with family's confirmation of SNF choices.   Eli Duran

## 2017-03-08 NOTE — PROGRESS NOTES
[facility]: MED RECONCILIATION    Comments/Recommendations:    Patient was alert and oriented, but appeared tired   Allergies were verified with patient and son  Liz Greene from tolterodine to mirabegron, but son stated he didn't think either one worked very well   Scheduled for an infliximab injection on 2017, but patient was in hospital at that time; put on hold   Takes warfarin 2.5mg on Sun, Tue, Wed, Fri, Sat and takes 5mg on Mon and Thur   CrCl is 20.8mL/min, mirabegron is renally eliminated. . A CrCl of 15-29 the dose is not recommended to exceed 25mg daily. Medications added:     · Vitamin D2 02215 U  · Dulcolax 5mg  · Mirabegron 50mg    Medications removed:    · None    Medications adjusted:    · Warfarin changed to 5mg on Mon and Thur; 2.5mg on Sun, Tue, Wed, Fri, Sat    Information obtained from: patient, son, Rx Query    Allergies: Ciprofloxacin    Prior to Admission Medications:   Prior to Admission Medications   Prescriptions Last Dose Informant Patient Reported? Taking? albuterol-ipratropium (DUO-NEB) 2.5 mg-0.5 mg/3 ml nebu 2017 Child No No   Sig: 3 mL by Nebulization route every six (6) hours as needed. amLODIPine (NORVASC) 5 mg tablet 3/7/2017 at 1000 Child No Yes   Sig: Take 1 Tab by mouth daily. bisacodyl (DULCOLAX, BISACODYL,) 5 mg EC tablet 3/7/2017 at pm Child Yes Yes   Sig: Take 5 mg by mouth daily as needed for Constipation. ergocalciferol (ERGOCALCIFEROL) 50,000 unit capsule 2017 Child Yes Yes   Sig: Take 50,000 Units by mouth every thirty (30) days. folic acid (FOLVITE) 1 mg tablet 3/7/2017 at 1000 Child No Yes   Sig: Take 1 Tab by mouth daily. guaiFENesin ER (MUCINEX) 600 mg ER tablet Not Taking at Unknown time  No No   Sig: Take 1 Tab by mouth every twelve (12) hours for 5 days. Can use over the counter forms   Patient not taking: Reported on 3/8/2017   inFLIXimab (REMICADE) 100 mg injection 2017 Child Yes Yes   Si mg/kg by IntraVENous route once. Every 6 weeks   mirabegron ER (MYRBETRIQ) 50 mg ER tablet 3/7/2017 at am Child Yes Yes   Sig: Take 50 mg by mouth daily. nebivolol (BYSTOLIC) 5 mg tablet 7/7/2969 at 1000 Child Yes Yes   Sig: Take 5 mg by mouth daily. Indications: hypertension   pantoprazole (PROTONIX) 40 mg tablet 3/7/2017 at 1000 Child Yes Yes   Sig: Take 40 mg by mouth daily. predniSONE (DELTASONE) 5 mg tablet 3/7/2017 at 1000 Child Yes Yes   Sig: Take 5 mg by mouth daily. warfarin (COUMADIN) 2.5 mg tablet 3/7/2017 at 1800 Child Yes Yes   Sig: Take 5 mg by mouth every Monday and Thursday. warfarin (COUMADIN) 2.5 mg tablet 3/5/2017 at 1800vitam Child Yes Yes   Sig: Take 2.5 mg by mouth five (5) days a week.  Takes 2.5mg on Sunday, Tuesday, Wednesday, Friday, Saturday      Facility-Administered Medications: None     Thank you,  [address] [facility]  Class of 2017

## 2017-03-08 NOTE — PROGRESS NOTES
1945-Report received from ER nurseRianna. Pt. In route. 2020-Pt. Arrived. Pt. Assessed. Care assumed. 2353-Morphine 1 mg given slow ivp per prn order for c/o LUQ pain. 0000-Pain relieved. 0342-Morphine 1 mg given slow ivp per prn order for c/o RUQ pain radiating to lower back. VSS.   0400-Back pain relieved but RUQ pain remains 8/10. VSS  0412-Dr. Alberto Lawton notified of unrelieved pain. New orders received for morphine 2mg ivp now et 2 mg q4 prn pain. 0427-Morphine 2mg given slow ivp per now order  0500-Pain follow-up. Pt. States has not helped at all. Rates pain 8/10. VSS  0510-Dr. Alberto Lawton notified. Morphine d/c'd et changed to dilaudid prn top start now. 0531-Pt. Now rates RUQ pain 4/10 prior to next intervention. Dilaudid 1 mg given slow ivp per order. 0600-Pt. Asleep  0700-Report given to oncoming nurse. Pt. Stable. Care relinquished at this time.

## 2017-03-08 NOTE — PROGRESS NOTES
0800 Pt assessed. No complaints at this time. Abd soft, round. BS active x4. Passing gas. No nausea vomiting at this time. Son at bedside. Would like to have a DDNR completed while in hospital. Will consult palliative. Turned, repositioned. Mouth care given. Instructed pt NPO status. Verbalized understanding. 1000 Pt on bedpan. Had a medium loose brown stool mixed with urine. Pt care given. Turned, repositioned. Dual skin assess with Micaela Mayorga RN. Erythema to buttocks, sacrum, coccyx, bilateral heels. Blanches. 1045 Dr. Levada Brittle at bedside, assessed pt. Possible drain common bile duct. Remain NPO. Need clearance from pulmonary. 1145 Pt had a moderate loose brown stool with urine mixed. Post residual void 648ml. Pt care given. Micaela Mayorga RN inserted 16fr mcallister cath per policy. Witnessed with primary nurse Lauren Hewitt RN. Tolerated well. 625ml clear bonnie urine returned. Urine collected, sent to lab. Turned, repositioned. Mouth care given. Notified Dr. Equilla Dakin concerning mcallister insertion for urine retention. Order obtained. 200 Dr. Ramirez Briceno cleared pt for surgery. Notified Dr. Levada Brittle. Pt to remain in [place] [id number] today. 12 Pt and son requested DDNR. Form given to pt and son to read, discussed code status. Pt verbalized understanding. No chest compressions, no defib, no life support, no emergency medications. Form signed and witness with [address], son Noreen Rajas, Dr. Alexandria Fairchild. Notified Dr. Equilla Dakin.   0 Dr. Ramirez Briceno called. Surgery scheduled for tomorrow. Pt aware. 1400 Turned, repositioned. Mouth care given. 80 Dr. Levada Brittle at bedside, discussed with pt and son surgery lap, possible open cholecystectomy. Verbalized understanding, signed consent for surgery and blood transfusion if needed. 1600 Pt reassessed. Turned, repostioned. Mouth care given. No complaints. 1800 Turned, repositioned. No complaints.    1900 Bedside and Verbal shift change report given to Sumanth Zee RN (oncoming nurse) by Lynn Cummings RN (offgoing nurse). Report included the following information SBAR, Kardex, ED Summary, Intake/Output, MAR, Recent Results and Cardiac Rhythm sr.

## 2017-03-09 ENCOUNTER — ANESTHESIA (OUTPATIENT)
Dept: SURGERY | Age: 82
DRG: 853 | End: 2017-03-09
Payer: MEDICARE

## 2017-03-09 LAB
ALBUMIN SERPL BCP-MCNC: 1.9 G/DL (ref 3.5–5)
ALBUMIN/GLOB SERPL: 0.4 {RATIO} (ref 1.1–2.2)
ALP SERPL-CCNC: 88 U/L (ref 45–117)
ALT SERPL-CCNC: 18 U/L (ref 12–78)
ANION GAP BLD CALC-SCNC: 16 MMOL/L (ref 5–15)
APTT PPP: 34.4 SEC (ref 22.1–32.5)
AST SERPL W P-5'-P-CCNC: 24 U/L (ref 15–37)
BACTERIA SPEC CULT: NORMAL
BASOPHILS # BLD AUTO: 0 K/UL (ref 0–0.1)
BASOPHILS # BLD: 0 % (ref 0–1)
BILIRUB SERPL-MCNC: 0.6 MG/DL (ref 0.2–1)
BUN SERPL-MCNC: 50 MG/DL (ref 6–20)
BUN/CREAT SERPL: 26 (ref 12–20)
CALCIUM SERPL-MCNC: 7.6 MG/DL (ref 8.5–10.1)
CC UR VC: NORMAL
CHLORIDE SERPL-SCNC: 111 MMOL/L (ref 97–108)
CO2 SERPL-SCNC: 16 MMOL/L (ref 21–32)
CREAT SERPL-MCNC: 1.9 MG/DL (ref 0.55–1.02)
DIFFERENTIAL METHOD BLD: ABNORMAL
EOSINOPHIL # BLD: 0 K/UL (ref 0–0.4)
EOSINOPHIL NFR BLD: 0 % (ref 0–7)
ERYTHROCYTE [DISTWIDTH] IN BLOOD BY AUTOMATED COUNT: 20.8 % (ref 11.5–14.5)
GLOBULIN SER CALC-MCNC: 5 G/DL (ref 2–4)
GLUCOSE SERPL-MCNC: 122 MG/DL (ref 65–100)
HCT VFR BLD AUTO: 32 % (ref 35–47)
HGB BLD-MCNC: 10 G/DL (ref 11.5–16)
INR PPP: 1.3 (ref 0.9–1.1)
LYMPHOCYTES # BLD AUTO: 2 % (ref 12–49)
LYMPHOCYTES # BLD: 0.5 K/UL (ref 0.8–3.5)
MAGNESIUM SERPL-MCNC: 2.8 MG/DL (ref 1.6–2.4)
MCH RBC QN AUTO: 24.2 PG (ref 26–34)
MCHC RBC AUTO-ENTMCNC: 31.3 G/DL (ref 30–36.5)
MCV RBC AUTO: 77.5 FL (ref 80–99)
MONOCYTES # BLD: 1.3 K/UL (ref 0–1)
MONOCYTES NFR BLD AUTO: 5 % (ref 5–13)
NEUTS SEG # BLD: 23.8 K/UL (ref 1.8–8)
NEUTS SEG NFR BLD AUTO: 93 % (ref 32–75)
NRBC # BLD: 0.03 K/UL (ref 0–0.01)
NRBC BLD-RTO: 0.1 PER 100 WBC
PHOSPHATE SERPL-MCNC: 4.1 MG/DL (ref 2.6–4.7)
PLATELET # BLD AUTO: 363 K/UL (ref 150–400)
POTASSIUM SERPL-SCNC: 3.2 MMOL/L (ref 3.5–5.1)
PROT SERPL-MCNC: 6.9 G/DL (ref 6.4–8.2)
PROTHROMBIN TIME: 12.8 SEC (ref 9–11.1)
RBC # BLD AUTO: 4.13 M/UL (ref 3.8–5.2)
RBC MORPH BLD: ABNORMAL
RBC MORPH BLD: ABNORMAL
SERVICE CMNT-IMP: NORMAL
SERVICE CMNT-IMP: NORMAL
SODIUM SERPL-SCNC: 143 MMOL/L (ref 136–145)
THERAPEUTIC RANGE,PTTT: ABNORMAL SECS (ref 58–77)
WBC # BLD AUTO: 25.6 K/UL (ref 3.6–11)
WBC NRBC COR # BLD: ABNORMAL 10*3/UL

## 2017-03-09 PROCEDURE — 77030009403 HC ELECTRD ENDO MEGA -B: Performed by: SURGERY

## 2017-03-09 PROCEDURE — 74011250636 HC RX REV CODE- 250/636: Performed by: INTERNAL MEDICINE

## 2017-03-09 PROCEDURE — 76060000035 HC ANESTHESIA 2 TO 2.5 HR: Performed by: SURGERY

## 2017-03-09 PROCEDURE — 77030027497 HC RELD STLPR EGIA MTHK COVD -C: Performed by: SURGERY

## 2017-03-09 PROCEDURE — 77030027744 HC PWDR HEMSTAT ARISTA ABSRB 5GM BARD -D: Performed by: SURGERY

## 2017-03-09 PROCEDURE — 74011250636 HC RX REV CODE- 250/636

## 2017-03-09 PROCEDURE — 74011250636 HC RX REV CODE- 250/636: Performed by: SURGERY

## 2017-03-09 PROCEDURE — 77030026438 HC STYL ET INTUB CARD -A: Performed by: ANESTHESIOLOGY

## 2017-03-09 PROCEDURE — 77030035051: Performed by: SURGERY

## 2017-03-09 PROCEDURE — 76010000131 HC OR TIME 2 TO 2.5 HR: Performed by: SURGERY

## 2017-03-09 PROCEDURE — 74011250636 HC RX REV CODE- 250/636: Performed by: ANESTHESIOLOGY

## 2017-03-09 PROCEDURE — 76210000017 HC OR PH I REC 1.5 TO 2 HR: Performed by: SURGERY

## 2017-03-09 PROCEDURE — 77030012405 HC DRN WND ADLR -A: Performed by: SURGERY

## 2017-03-09 PROCEDURE — 83735 ASSAY OF MAGNESIUM: CPT | Performed by: INTERNAL MEDICINE

## 2017-03-09 PROCEDURE — 77030011640 HC PAD GRND REM COVD -A: Performed by: SURGERY

## 2017-03-09 PROCEDURE — 0FT44ZZ RESECTION OF GALLBLADDER, PERCUTANEOUS ENDOSCOPIC APPROACH: ICD-10-PCS | Performed by: SURGERY

## 2017-03-09 PROCEDURE — 74011000258 HC RX REV CODE- 258: Performed by: INTERNAL MEDICINE

## 2017-03-09 PROCEDURE — 77030013032 HC MSK BPAP/CPAP FISP -B

## 2017-03-09 PROCEDURE — 77030013079 HC BLNKT BAIR HGGR 3M -A: Performed by: ANESTHESIOLOGY

## 2017-03-09 PROCEDURE — 85025 COMPLETE CBC W/AUTO DIFF WBC: CPT | Performed by: INTERNAL MEDICINE

## 2017-03-09 PROCEDURE — 77030020263 HC SOL INJ SOD CL0.9% LFCR 1000ML: Performed by: SURGERY

## 2017-03-09 PROCEDURE — 85610 PROTHROMBIN TIME: CPT | Performed by: INTERNAL MEDICINE

## 2017-03-09 PROCEDURE — 77030027138 HC INCENT SPIROMETER -A

## 2017-03-09 PROCEDURE — 77030035045 HC TRCR ENDOSC VRSPRT BLDLSS COVD -B: Performed by: SURGERY

## 2017-03-09 PROCEDURE — 77030020782 HC GWN BAIR PAWS FLX 3M -B

## 2017-03-09 PROCEDURE — 77030013140 HC MSK NEB VYRM -A

## 2017-03-09 PROCEDURE — 94640 AIRWAY INHALATION TREATMENT: CPT

## 2017-03-09 PROCEDURE — 77030037032 HC INSRT SCIS CLICKLLINE DISP STOR -B: Performed by: SURGERY

## 2017-03-09 PROCEDURE — 77030035048 HC TRCR ENDOSC OPTCL COVD -B: Performed by: SURGERY

## 2017-03-09 PROCEDURE — 84100 ASSAY OF PHOSPHORUS: CPT | Performed by: INTERNAL MEDICINE

## 2017-03-09 PROCEDURE — 77030022473 HC HNDL ENDO GIA UNIV USDA -C: Performed by: SURGERY

## 2017-03-09 PROCEDURE — 74011000250 HC RX REV CODE- 250: Performed by: INTERNAL MEDICINE

## 2017-03-09 PROCEDURE — 77030020747 HC TU INSUF ENDOSC TELE -A: Performed by: SURGERY

## 2017-03-09 PROCEDURE — 77030008684 HC TU ET CUF COVD -B: Performed by: ANESTHESIOLOGY

## 2017-03-09 PROCEDURE — 77030019908 HC STETH ESOPH SIMS -A: Performed by: ANESTHESIOLOGY

## 2017-03-09 PROCEDURE — 77030002916 HC SUT ETHLN J&J -A: Performed by: SURGERY

## 2017-03-09 PROCEDURE — 80053 COMPREHEN METABOLIC PANEL: CPT | Performed by: INTERNAL MEDICINE

## 2017-03-09 PROCEDURE — 77030009852 HC PCH RTVR ENDOSC COVD -B: Performed by: SURGERY

## 2017-03-09 PROCEDURE — 36415 COLL VENOUS BLD VENIPUNCTURE: CPT | Performed by: INTERNAL MEDICINE

## 2017-03-09 PROCEDURE — 88304 TISSUE EXAM BY PATHOLOGIST: CPT | Performed by: INTERNAL MEDICINE

## 2017-03-09 PROCEDURE — 77030018836 HC SOL IRR NACL ICUM -A: Performed by: SURGERY

## 2017-03-09 PROCEDURE — 77030031139 HC SUT VCRL2 J&J -A: Performed by: SURGERY

## 2017-03-09 PROCEDURE — 74011000250 HC RX REV CODE- 250: Performed by: SURGERY

## 2017-03-09 PROCEDURE — C9290 INJ, BUPIVACAINE LIPOSOME: HCPCS | Performed by: SURGERY

## 2017-03-09 PROCEDURE — 77030013567 HC DRN WND RESERV BARD -A: Performed by: SURGERY

## 2017-03-09 PROCEDURE — 85730 THROMBOPLASTIN TIME PARTIAL: CPT | Performed by: INTERNAL MEDICINE

## 2017-03-09 PROCEDURE — 77030010507 HC ADH SKN DERMBND J&J -B: Performed by: SURGERY

## 2017-03-09 PROCEDURE — 77030032490 HC SLV COMPR SCD KNE COVD -B: Performed by: SURGERY

## 2017-03-09 PROCEDURE — 77030010513 HC APPL CLP LIG J&J -C: Performed by: SURGERY

## 2017-03-09 PROCEDURE — 74011000250 HC RX REV CODE- 250

## 2017-03-09 PROCEDURE — 77030035029 HC NDL INSUF VERES DISP COVD -B: Performed by: SURGERY

## 2017-03-09 PROCEDURE — 77030002933 HC SUT MCRYL J&J -A: Performed by: SURGERY

## 2017-03-09 PROCEDURE — 87075 CULTR BACTERIA EXCEPT BLOOD: CPT | Performed by: INTERNAL MEDICINE

## 2017-03-09 PROCEDURE — 93970 EXTREMITY STUDY: CPT

## 2017-03-09 PROCEDURE — 87205 SMEAR GRAM STAIN: CPT | Performed by: INTERNAL MEDICINE

## 2017-03-09 PROCEDURE — 65660000000 HC RM CCU STEPDOWN

## 2017-03-09 PROCEDURE — C9113 INJ PANTOPRAZOLE SODIUM, VIA: HCPCS | Performed by: INTERNAL MEDICINE

## 2017-03-09 RX ORDER — GLYCOPYRROLATE 0.2 MG/ML
INJECTION INTRAMUSCULAR; INTRAVENOUS AS NEEDED
Status: DISCONTINUED | OUTPATIENT
Start: 2017-03-09 | End: 2017-03-09 | Stop reason: HOSPADM

## 2017-03-09 RX ORDER — FENTANYL CITRATE 50 UG/ML
50 INJECTION, SOLUTION INTRAMUSCULAR; INTRAVENOUS AS NEEDED
Status: DISCONTINUED | OUTPATIENT
Start: 2017-03-09 | End: 2017-03-09 | Stop reason: HOSPADM

## 2017-03-09 RX ORDER — LABETALOL HCL 20 MG/4 ML
10 SYRINGE (ML) INTRAVENOUS
Status: DISCONTINUED | OUTPATIENT
Start: 2017-03-09 | End: 2017-03-09 | Stop reason: HOSPADM

## 2017-03-09 RX ORDER — ALBUTEROL SULFATE 0.83 MG/ML
2.5 SOLUTION RESPIRATORY (INHALATION) AS NEEDED
Status: DISCONTINUED | OUTPATIENT
Start: 2017-03-09 | End: 2017-03-09 | Stop reason: HOSPADM

## 2017-03-09 RX ORDER — ESMOLOL HYDROCHLORIDE 10 MG/ML
INJECTION INTRAVENOUS AS NEEDED
Status: DISCONTINUED | OUTPATIENT
Start: 2017-03-09 | End: 2017-03-09 | Stop reason: HOSPADM

## 2017-03-09 RX ORDER — LIDOCAINE HYDROCHLORIDE 20 MG/ML
INJECTION, SOLUTION EPIDURAL; INFILTRATION; INTRACAUDAL; PERINEURAL AS NEEDED
Status: DISCONTINUED | OUTPATIENT
Start: 2017-03-09 | End: 2017-03-09 | Stop reason: HOSPADM

## 2017-03-09 RX ORDER — DIPHENHYDRAMINE HYDROCHLORIDE 50 MG/ML
12.5 INJECTION, SOLUTION INTRAMUSCULAR; INTRAVENOUS AS NEEDED
Status: DISCONTINUED | OUTPATIENT
Start: 2017-03-09 | End: 2017-03-09 | Stop reason: HOSPADM

## 2017-03-09 RX ORDER — SODIUM CHLORIDE, SODIUM LACTATE, POTASSIUM CHLORIDE, CALCIUM CHLORIDE 600; 310; 30; 20 MG/100ML; MG/100ML; MG/100ML; MG/100ML
150 INJECTION, SOLUTION INTRAVENOUS CONTINUOUS
Status: DISCONTINUED | OUTPATIENT
Start: 2017-03-09 | End: 2017-03-09 | Stop reason: HOSPADM

## 2017-03-09 RX ORDER — ROCURONIUM BROMIDE 10 MG/ML
INJECTION, SOLUTION INTRAVENOUS AS NEEDED
Status: DISCONTINUED | OUTPATIENT
Start: 2017-03-09 | End: 2017-03-09 | Stop reason: HOSPADM

## 2017-03-09 RX ORDER — SUCCINYLCHOLINE CHLORIDE 20 MG/ML
INJECTION INTRAMUSCULAR; INTRAVENOUS AS NEEDED
Status: DISCONTINUED | OUTPATIENT
Start: 2017-03-09 | End: 2017-03-09 | Stop reason: HOSPADM

## 2017-03-09 RX ORDER — ONDANSETRON 2 MG/ML
4 INJECTION INTRAMUSCULAR; INTRAVENOUS AS NEEDED
Status: DISCONTINUED | OUTPATIENT
Start: 2017-03-09 | End: 2017-03-09 | Stop reason: HOSPADM

## 2017-03-09 RX ORDER — MIDAZOLAM HYDROCHLORIDE 1 MG/ML
1 INJECTION, SOLUTION INTRAMUSCULAR; INTRAVENOUS AS NEEDED
Status: DISCONTINUED | OUTPATIENT
Start: 2017-03-09 | End: 2017-03-09 | Stop reason: SDUPTHER

## 2017-03-09 RX ORDER — NEOSTIGMINE METHYLSULFATE 1 MG/ML
INJECTION INTRAVENOUS AS NEEDED
Status: DISCONTINUED | OUTPATIENT
Start: 2017-03-09 | End: 2017-03-09 | Stop reason: HOSPADM

## 2017-03-09 RX ORDER — SODIUM CHLORIDE, SODIUM LACTATE, POTASSIUM CHLORIDE, CALCIUM CHLORIDE 600; 310; 30; 20 MG/100ML; MG/100ML; MG/100ML; MG/100ML
125 INJECTION, SOLUTION INTRAVENOUS CONTINUOUS
Status: DISCONTINUED | OUTPATIENT
Start: 2017-03-09 | End: 2017-03-10

## 2017-03-09 RX ORDER — SODIUM CHLORIDE, SODIUM LACTATE, POTASSIUM CHLORIDE, CALCIUM CHLORIDE 600; 310; 30; 20 MG/100ML; MG/100ML; MG/100ML; MG/100ML
INJECTION, SOLUTION INTRAVENOUS
Status: DISCONTINUED | OUTPATIENT
Start: 2017-03-09 | End: 2017-03-09 | Stop reason: HOSPADM

## 2017-03-09 RX ORDER — ONDANSETRON 2 MG/ML
INJECTION INTRAMUSCULAR; INTRAVENOUS AS NEEDED
Status: DISCONTINUED | OUTPATIENT
Start: 2017-03-09 | End: 2017-03-09 | Stop reason: HOSPADM

## 2017-03-09 RX ORDER — PROPOFOL 10 MG/ML
INJECTION, EMULSION INTRAVENOUS AS NEEDED
Status: DISCONTINUED | OUTPATIENT
Start: 2017-03-09 | End: 2017-03-09 | Stop reason: HOSPADM

## 2017-03-09 RX ORDER — BUPIVACAINE HYDROCHLORIDE 5 MG/ML
INJECTION, SOLUTION EPIDURAL; INTRACAUDAL AS NEEDED
Status: DISCONTINUED | OUTPATIENT
Start: 2017-03-09 | End: 2017-03-09 | Stop reason: HOSPADM

## 2017-03-09 RX ORDER — MIDAZOLAM HYDROCHLORIDE 1 MG/ML
1 INJECTION, SOLUTION INTRAMUSCULAR; INTRAVENOUS AS NEEDED
Status: DISCONTINUED | OUTPATIENT
Start: 2017-03-09 | End: 2017-03-09 | Stop reason: HOSPADM

## 2017-03-09 RX ORDER — SODIUM CHLORIDE, SODIUM LACTATE, POTASSIUM CHLORIDE, CALCIUM CHLORIDE 600; 310; 30; 20 MG/100ML; MG/100ML; MG/100ML; MG/100ML
100 INJECTION, SOLUTION INTRAVENOUS CONTINUOUS
Status: DISCONTINUED | OUTPATIENT
Start: 2017-03-09 | End: 2017-03-09 | Stop reason: HOSPADM

## 2017-03-09 RX ORDER — LIDOCAINE HYDROCHLORIDE 10 MG/ML
0.1 INJECTION, SOLUTION EPIDURAL; INFILTRATION; INTRACAUDAL; PERINEURAL AS NEEDED
Status: DISCONTINUED | OUTPATIENT
Start: 2017-03-09 | End: 2017-03-09 | Stop reason: HOSPADM

## 2017-03-09 RX ORDER — HYDROMORPHONE HYDROCHLORIDE 1 MG/ML
1 INJECTION, SOLUTION INTRAMUSCULAR; INTRAVENOUS; SUBCUTANEOUS
Status: DISCONTINUED | OUTPATIENT
Start: 2017-03-09 | End: 2017-03-09 | Stop reason: HOSPADM

## 2017-03-09 RX ORDER — FENTANYL CITRATE 50 UG/ML
INJECTION, SOLUTION INTRAMUSCULAR; INTRAVENOUS AS NEEDED
Status: DISCONTINUED | OUTPATIENT
Start: 2017-03-09 | End: 2017-03-09 | Stop reason: HOSPADM

## 2017-03-09 RX ORDER — POTASSIUM CHLORIDE 7.45 MG/ML
10 INJECTION INTRAVENOUS
Status: COMPLETED | OUTPATIENT
Start: 2017-03-09 | End: 2017-03-13

## 2017-03-09 RX ADMIN — HYDROMORPHONE HYDROCHLORIDE 1 MG: 1 INJECTION, SOLUTION INTRAMUSCULAR; INTRAVENOUS; SUBCUTANEOUS at 20:52

## 2017-03-09 RX ADMIN — IPRATROPIUM BROMIDE AND ALBUTEROL SULFATE 3 ML: .5; 2.5 SOLUTION RESPIRATORY (INHALATION) at 18:14

## 2017-03-09 RX ADMIN — PIPERACILLIN SODIUM,TAZOBACTAM SODIUM 3.38 G: 3; .375 INJECTION, POWDER, FOR SOLUTION INTRAVENOUS at 01:28

## 2017-03-09 RX ADMIN — GLYCOPYRROLATE 0.5 MG: 0.2 INJECTION INTRAMUSCULAR; INTRAVENOUS at 11:55

## 2017-03-09 RX ADMIN — SODIUM CHLORIDE, SODIUM LACTATE, POTASSIUM CHLORIDE, CALCIUM CHLORIDE: 600; 310; 30; 20 INJECTION, SOLUTION INTRAVENOUS at 10:06

## 2017-03-09 RX ADMIN — ROCURONIUM BROMIDE 5 MG: 10 INJECTION, SOLUTION INTRAVENOUS at 11:25

## 2017-03-09 RX ADMIN — HYDROMORPHONE HYDROCHLORIDE 0.5 MG: 1 INJECTION, SOLUTION INTRAMUSCULAR; INTRAVENOUS; SUBCUTANEOUS at 13:40

## 2017-03-09 RX ADMIN — ESMOLOL HYDROCHLORIDE 10 MG: 10 INJECTION INTRAVENOUS at 12:06

## 2017-03-09 RX ADMIN — HYDROCORTISONE SODIUM SUCCINATE 100 MG: 100 INJECTION, POWDER, FOR SOLUTION INTRAMUSCULAR; INTRAVENOUS at 06:10

## 2017-03-09 RX ADMIN — ROCURONIUM BROMIDE 5 MG: 10 INJECTION, SOLUTION INTRAVENOUS at 10:15

## 2017-03-09 RX ADMIN — SODIUM CHLORIDE 40 MG: 9 INJECTION INTRAMUSCULAR; INTRAVENOUS; SUBCUTANEOUS at 21:42

## 2017-03-09 RX ADMIN — SODIUM CHLORIDE 100 ML/HR: 900 INJECTION, SOLUTION INTRAVENOUS at 12:52

## 2017-03-09 RX ADMIN — POTASSIUM CHLORIDE 10 MEQ: 10 INJECTION, SOLUTION INTRAVENOUS at 10:00

## 2017-03-09 RX ADMIN — SODIUM CHLORIDE 40 MG: 9 INJECTION INTRAMUSCULAR; INTRAVENOUS; SUBCUTANEOUS at 08:46

## 2017-03-09 RX ADMIN — NEOSTIGMINE METHYLSULFATE 3 MG: 1 INJECTION INTRAVENOUS at 11:55

## 2017-03-09 RX ADMIN — POTASSIUM CHLORIDE 10 MEQ: 10 INJECTION, SOLUTION INTRAVENOUS at 08:51

## 2017-03-09 RX ADMIN — HYDROCORTISONE SODIUM SUCCINATE 100 MG: 100 INJECTION, POWDER, FOR SOLUTION INTRAMUSCULAR; INTRAVENOUS at 21:42

## 2017-03-09 RX ADMIN — POTASSIUM CHLORIDE 10 MEQ: 10 INJECTION, SOLUTION INTRAVENOUS at 07:39

## 2017-03-09 RX ADMIN — LIDOCAINE HYDROCHLORIDE 40 MG: 20 INJECTION, SOLUTION EPIDURAL; INFILTRATION; INTRACAUDAL; PERINEURAL at 10:15

## 2017-03-09 RX ADMIN — ROCURONIUM BROMIDE 20 MG: 10 INJECTION, SOLUTION INTRAVENOUS at 10:30

## 2017-03-09 RX ADMIN — PIPERACILLIN SODIUM,TAZOBACTAM SODIUM 3.38 G: 3; .375 INJECTION, POWDER, FOR SOLUTION INTRAVENOUS at 16:09

## 2017-03-09 RX ADMIN — ONDANSETRON 4 MG: 2 INJECTION INTRAMUSCULAR; INTRAVENOUS at 11:53

## 2017-03-09 RX ADMIN — HYDROMORPHONE HYDROCHLORIDE 1 MG: 1 INJECTION, SOLUTION INTRAMUSCULAR; INTRAVENOUS; SUBCUTANEOUS at 06:12

## 2017-03-09 RX ADMIN — SODIUM CHLORIDE, SODIUM LACTATE, POTASSIUM CHLORIDE, AND CALCIUM CHLORIDE 150 ML/HR: 600; 310; 30; 20 INJECTION, SOLUTION INTRAVENOUS at 08:00

## 2017-03-09 RX ADMIN — HYDROMORPHONE HYDROCHLORIDE 0.5 MG: 1 INJECTION, SOLUTION INTRAMUSCULAR; INTRAVENOUS; SUBCUTANEOUS at 13:28

## 2017-03-09 RX ADMIN — FENTANYL CITRATE 25 MCG: 50 INJECTION, SOLUTION INTRAMUSCULAR; INTRAVENOUS at 12:03

## 2017-03-09 RX ADMIN — FENTANYL CITRATE 100 MCG: 50 INJECTION, SOLUTION INTRAMUSCULAR; INTRAVENOUS at 10:15

## 2017-03-09 RX ADMIN — SODIUM CHLORIDE, SODIUM LACTATE, POTASSIUM CHLORIDE, AND CALCIUM CHLORIDE 125 ML/HR: 600; 310; 30; 20 INJECTION, SOLUTION INTRAVENOUS at 15:34

## 2017-03-09 RX ADMIN — HYDROCORTISONE SODIUM SUCCINATE 100 MG: 100 INJECTION, POWDER, FOR SOLUTION INTRAMUSCULAR; INTRAVENOUS at 14:00

## 2017-03-09 RX ADMIN — PIPERACILLIN SODIUM,TAZOBACTAM SODIUM 3.38 G: 3; .375 INJECTION, POWDER, FOR SOLUTION INTRAVENOUS at 08:46

## 2017-03-09 RX ADMIN — POTASSIUM CHLORIDE 10 MEQ: 10 INJECTION, SOLUTION INTRAVENOUS at 06:44

## 2017-03-09 RX ADMIN — SUCCINYLCHOLINE CHLORIDE 80 MG: 20 INJECTION INTRAMUSCULAR; INTRAVENOUS at 10:15

## 2017-03-09 RX ADMIN — PROPOFOL 100 MG: 10 INJECTION, EMULSION INTRAVENOUS at 10:15

## 2017-03-09 RX ADMIN — SODIUM CHLORIDE, SODIUM LACTATE, POTASSIUM CHLORIDE, AND CALCIUM CHLORIDE 150 ML/HR: 600; 310; 30; 20 INJECTION, SOLUTION INTRAVENOUS at 08:52

## 2017-03-09 NOTE — OP NOTES
[facility]   [address] [facility], [address]   OP NOTE       Name:  Jose Antonio Erickson   MR#:  [id number]   :  1933   Account #:  [de-identified]    Surgery Date:  2017   Date of Adm:  2017       PREOPERATIVE DIAGNOSIS: Cholelithiasis with cholecystitis. POSTOPERATIVE DIAGNOSIS: Cholelithiasis with cholecystitis and   gangrene of the gallbladder. PROCEDURES PERFORMED: Laparoscopic cholecystectomy. SURGEON: Pete Carrillo MD.    ANESTHESIA: General.    ANESTHESIOLOGIST: Dr. Leyda Conteh. ANTIBIOTICS: The patient was already being treated with IV broad-  spectrum antibiotics with sepsis. ESTIMATED BLOOD LOSS: Approximately 100 mL. SPECIMENS REMOVED: Gallbladder. COMPLICATIONS: None. FINDINGS: The patient had approximately 500 mL of bile in the   suprahepatic compartment. On initial visualization. The patient also   had adhesions in the supraumbilical area, predominantly of omentum. The patient had a thickened gangrenous gallbladder, which was   densely adherent to the liver and there was necrosis of the gallbladder   bed of the liver. The cystic duct was edematous and wide. The liver   appeared grossly normal.    DESCRIPTION OF PROCEDURE: Under general anesthesia and with   the patient supine on the operating table, the abdomen was cleaned,   prepped, and draped. The laparoscopic camera and CO2 insufflation   apparatus were fixed to the drapes in the usual fashion. A time-out was performed confirming the patient, procedure and   special equipment needed. Through a supraumbilical incision, a Veress needle was introduced   and its intraperitoneal position was confirmed and connected to CO2   insufflation. Pneumoperitoneum was created and maintained at 15   mmHg. The Veress needle was removed, a 12 mm trocar introduced,   and laparoscopic camera through this.  Under direct vision, three 5-mm   ports were positioned, one in the epigastrium to the right of the   falciform ligament, one in the right upper quadrant, and another in the   right flank. The patient was positioned in steep Trendelenburg with a   tilt to the left. The adhesions in the supraumbilical port were divided sharply. Suctioning irrigation of the right upper quadrant was performed and   bile was suctioned out. Initial attempts to grasp the fundus of the gallbladder failed, so needle   aspiration of the gallbladder was performed and approximately 250 mL   of bile was aspirated. The fundus was then lifted upwards. The   infundibulum was retracted laterally and inferiorly and dissection of   Calot triangle was commenced. This dissection was done very carefully and with care being taken not   to use any electrocautery. This dissection was performed   predominantly using the [place] forceps and hydrodissection. Eventually, a window was created between the gallbladder and the   cystic duct. This was closer to the neck. The cystic artery was not   clearly identified. Critical view of safety was obtained at this stage and   since the cystic duct at the neck was wide, it was decided to transect it   with the stapler. The 5-mm epigastric port was switched to an 8 mm   port and the neck of the gallbladder was transected with a 30 mm   stapler. The gallbladder was then dissected off from the liver bed using   electrocautery and achieving hemostasis as the dissection proceeded   upwards towards the fundus. It was noted that there was a necrosis of   the gallbladder bed and its dissection was slow and deliberate. The   gallbladder was thus removed from its bed. Using an Endobag through   the umbilical port, the gallbladder was removed. Thorough irrigation of the gallbladder bed and right upper quadrant   was performed with approximately 2 liters of normal saline. No   bleeding or bile leak was noted.       The gallbladder bed was then sprayed with at [place] hemostatic agent. Two CWB drains were brought through the flank port sites and sutured   in place. The pneumoperitoneum was released and the ports were removed   under vision. The umbilical port fascia was closed with 0 Vicryl and all incisions were   closed with subcuticular 4-0 Monocryl with Dermabond. Marcaine 30 mL and Exparel 20 mL was injected into each port site   prior to commencing the incisions. The patient tolerated the procedure well and was transferred to the   recovery room in stable condition.         MD To Sheridan / Vernon.Fanny   D:  03/09/2017   12:11   T:  03/09/2017   13:24   Job #:  [id number]

## 2017-03-09 NOTE — PROGRESS NOTES
[facility] PROGRESS NOTE  Pulmonary, Critical Care, and Sleep Medicine    Name: Jose Antonio Osborn MRN: [id number]   : 1933 Hospital: [facility]   Date: 3/9/2017  Admission Date: 3/7/2017     Chart and notes reviewed. Data reviewed. I review the patient's current medications in the medical record at each encounter.  I have evaluated and examined the patient. .     Overnight events reviewed:  No acute events overnight  Afebrile and hemodynamically stable  O2 sats 93% on 2L  WBC 25.6  S/p cholecystecomy this morning: tolerated well  CO2 16    ROS:  Feeling groggy after the procedure. Denies pain. Breathing feels okay. Vital Signs:  Visit Vitals    /50 (BP 1 Location: Left arm, BP Patient Position: At rest)    Pulse 72    Temp 97.9 °F (36.6 °C)    Resp 10    Ht 5' 4\" (1.626 m)    Wt 82.1 kg (181 lb)    SpO2 93%    Breastfeeding No    BMI 31.07 kg/m2       O2 Device: Nasal cannula   O2 Flow Rate (L/min): 2 l/min   Temp (24hrs), Av.8 °F (36.6 °C), Min:96.4 °F (35.8 °C), Max:98.9 °F (37.2 °C)       Intake/Output:   Last shift:       07 -  190  In: 1400 [I.V.:1400]  Out: 501 [Urine:380; Drains:21]  Last 3 shifts:  1901 -  0700  In: 3770 [I.V.:3770]  Out: 2125 [Urine:1625]    Intake/Output Summary (Last 24 hours) at 17 1433  Last data filed at 17 1344   Gross per 24 hour   Intake             3970 ml   Output             1501 ml   Net             2469 ml        Telemetry:     Physical Exam:   General:  Alert, cooperative, no distress, appears stated age. Head:  Normocephalic, without obvious abnormality, atraumatic. Eyes:  Conjunctivae/corneas clear. .   Nose: Nares normal. Septum midline. Mucosa normal.    Throat: Lips, mucosa, and tongue normal. Teeth and gums normal.   Neck: Supple, symmetrical, trachea midline, no adenopathy   Lungs:   Fine inspiratory crackles in the bases bilaterally.    Chest wall:  No tenderness or deformity. Heart:  Regular rate and rhythm, S1, S2 normal, no murmur, click, rub or gallop. Abdomen:   Soft, non-tender. Bowel sounds normal.    Extremities: Extremities normal, atraumatic, no cyanosis or edema. Pulses: 2+ and symmetric all extremities. Skin: Skin color, texture, turgor normal. No rashes or lesions   Lymph nodes: Cervical  nodes normal.   Neurologic: Grossly nonfocal     DATA:  MAR reviewed and pertinent medications noted or modified as needed    Labs:  Recent Labs      03/09/17 0355 03/08/17 0529 03/07/17   1621   WBC  25.6*  31.0*  40.4*   HGB  10.0*  10.8*  11.9   HCT  32.0*  34.2*  38.3   PLT  363  336  380     Recent Labs      03/09/17 0355 03/08/17 0529 03/07/17   1622  03/07/17   1621   NA  143  138   --   132*   K  3.2*  3.6   --   3.9   CL  111*  104   --   97   CO2  16*  19*   --   21   GLU  122*  140*   --   155*   BUN  50*  42*   --   31*   CREA  1.90*  2.18*   --   2.14*   CA  7.6*  7.9*   --   8.6   MG  2.8*  2.5*   --    --    PHOS  4.1  4.8*   --    --    ALB  1.9*  2.1*   --   2.4*   TBILI  0.6  0.8   --   1.1*   SGOT  24  28   --   29   ALT  18  18   --   22   INR  1.3*  2.2*  1.9*   --        Imaging:  I have personally reviewed the patients radiographs and reports. 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 She has elected to continue taking it. Her ILD appears stable at this time. · Patient would benefit from outpatient pulmonary follow up.   · Advance diet per surgery recs  · Encourage IS use  · OOB/PT/OT tomorrow  · GI prophylaxis: Protonix  · DVT prophylaxis: Coumadin      Nicolette Birch NP

## 2017-03-09 NOTE — PROGRESS NOTES
1900-Report received from offgoing nurse. Care assumed. 2030-Large brown loose stool. Pt. Denies pain. 0320-Large liquid brown stool  0623-RUQ \"burning\"  et pain under right \"breast bone\". Pain rated 7/10. Dilaudid given per prn order. 0635-Rosario Martínez notified of K+=3.2. Orders received. 0700-Report given to oncoming nurse. Questions answered. Care relinquished at this time.

## 2017-03-09 NOTE — PROGRESS NOTES
[facility]  [address]  [phone]      Medical Progress Note      NAME: Brett Patel   :  1933  MRM:  [id number]    Date/Time: 3/9/2017          Subjective:     Chief Complaint:  F/u acute cholecystitis    Chart/notes/labs/studies reviewed, patient examined at bedside. Seen after surgery. Somnolent, arousable. Awakes easily and answers questions        Objective:       Vitals:          Last 24hrs VS reviewed since prior progress note. Most recent are:    Visit Vitals    /50 (BP 1 Location: Left arm, BP Patient Position: At rest)    Pulse 72    Temp 97.9 °F (36.6 °C)    Resp 10    Ht 5' 4\" (1.626 m)    Wt 82.1 kg (181 lb)    SpO2 93%    Breastfeeding No    BMI 31.07 kg/m2     SpO2 Readings from Last 6 Encounters:   17 93%   17 95%   11/18/15 93%   07/31/15 95%   13 99%    O2 Flow Rate (L/min): 2 l/min       Intake/Output Summary (Last 24 hours) at 17 1524  Last data filed at 17 1344   Gross per 24 hour   Intake             3970 ml   Output             1501 ml   Net             2469 ml          Exam:     Physical Exam:    Gen:  Elderly, well-developed, well-nourished, in no acute process. Family at bedside  HEENT:  Sclerae nonicteric, hearing intact to voice, mucous membranes moist  Neck:  Supple, without masses. Resp:  No accessory muscle use, mildly diminished othewise CTAB without wheezes, rales, or rhonchi  Card: RRR, without m/r/g. No LE edema. Abd:  +bowel sounds, soft, NTTP, nondistended. Drain in place, serosang fluid. Dressing with small amount of blood  Neuro: Face symmetric, tongue midline, speech fluent, follows commands appropriately  Psych:  Somnolent, arousable.  Oriented to self, hospital. Fair insight     Medications Reviewed: (see below)    Lab Data Reviewed: (see below)    ______________________________________________________________________    Medications:     Current Facility-Administered Medications   Medication Dose Route Frequency    lactated ringers infusion  125 mL/hr IntraVENous CONTINUOUS    HYDROmorphone (PF) (DILAUDID) injection 1 mg  1 mg IntraVENous Q4H PRN    hydrocortisone Sod Succ (PF) (SOLU-CORTEF) injection 100 mg  100 mg IntraVENous Q8H    pantoprazole (PROTONIX) 40 mg in sodium chloride 0.9 % 10 mL injection  40 mg IntraVENous Q12H    naloxone (NARCAN) injection 0.4 mg  0.4 mg IntraVENous PRN    acetaminophen (TYLENOL) tablet 650 mg  650 mg Oral Q4H PRN    diphenhydrAMINE (BENADRYL) injection 12.5 mg  12.5 mg IntraVENous Q4H PRN    ondansetron (ZOFRAN) injection 4 mg  4 mg IntraVENous Q4H PRN    piperacillin-tazobactam (ZOSYN) 3.375 g in 0.9% sodium chloride (MBP/ADV) 100 mL  3.375 g IntraVENous Q8H    albuterol-ipratropium (DUO-NEB) 2.5 MG-0.5 MG/3 ML  3 mL Nebulization Q6H PRN            Lab Review:     Recent Labs      03/09/17   0355  03/08/17   0529  03/07/17   1621   WBC  25.6*  31.0*  40.4*   HGB  10.0*  10.8*  11.9   HCT  32.0*  34.2*  38.3   PLT  363  336  380     Recent Labs      03/09/17   0355  03/08/17   0529  03/07/17   1622  03/07/17   1621   NA  143  138   --   132*   K  3.2*  3.6   --   3.9   CL  111*  104   --   97   CO2  16*  19*   --   21   GLU  122*  140*   --   155*   BUN  50*  42*   --   31*   CREA  1.90*  2.18*   --   2.14*   CA  7.6*  7.9*   --   8.6   MG  2.8*  2.5*   --    --    PHOS  4.1  4.8*   --    --    ALB  1.9*  2.1*   --   2.4*   SGOT  24  28   --   29   ALT  18  18   --   22   INR  1.3*  2.2*  1.9*   --      No components found for: GLPOC  No results for input(s): PH, PCO2, PO2, HCO3, FIO2 in the last 72 hours.   Recent Labs      03/09/17   0355  03/08/17   0529  03/07/17   1622   INR  1.3*  2.2*  1.9*     No results found for: SDES  Lab Results   Component Value Date/Time    Culture result: MRSA NOT PRESENT 03/07/2017 10:53 PM    Culture result:  03/07/2017 10:53 PM         Screening of patient nares for MRSA is for surveillance purposes and, if positive, to facilitate isolation considerations in high risk settings. It is not intended for automatic decolonization interventions per se as regimens are not sufficiently effective to warrant routine use. Culture result: NO GROWTH 5 DAYS 03/01/2017 10:45 PM            Assessment / Plan:     81 yo WF w/ hx of IPF, HTN, DVT, recent admission for bronchitis p/w abdominal pain, found to have acute cholecystitis    Acute cholecystitis / Leukocytosis / Nausea & vomiting / Diarrhea: C. Diff negative. S/p lap guillermina, found to have gangrenous GB   -- cont IV pip/tazo  -- monitor respiratory rate. Hold opioids for sedation. Discussed with nursing     ARF (acute renal failure) / Hyponatremia / CKD (chronic kidney disease) stage 3   -- monitor on IVF, changed to LR. Discussed with Dr. Janet Rodas     Chronic respiratory failure with hypoxia / IPF (idiopathic pulmonary fibrosis) : Noted on recent CT chest. Recent consult from pulmonologist. Perhaps rheumatoid vs MTX damage? -- cont supplemental O2 and duo-nebs PRN     Rheumatoid arthritis: On low dose prednisone, holding methotrexate. With cholecystitis, and concerns of AI, stress steroids were started  -- cont stress-dose steroids, wean tomorrow     Hx of deep venous thrombosis / Hx pulmonary embolism: On coumadin at home  -- discussed with Dr. Ange Gonzalez about resuming heparin drip / warfarin. He would like to hold off given significant risk of bleeding immediately post op. Will repeat LE dopplers to assess clot burden  -- if no significant post-op bleeding, then start heparin gtt tomorrow morning per Dr. Lizz Mera recommendation    Hypertension  -- Holding Norvasc, Bystolic     Weakness / Debilitated patient  -- PT/OT to resume tomorrow     Anemia, unspecified  -- monitor Hg      Esophageal reflux  --  Continue PPI      Total time spent with patient: 45 minutes, d/w children at bedside.  Discussed with Dr. Hemalatha Coronado discussed with: Patient, Nursing Staff and >50% of time spent in counseling and coordination of care    Discussed:  Care Plan    Prophylaxis:  Coumadin    Disposition:  SNF/LTC           ___________________________________________________    Attending Physician: Lidia Bell MD

## 2017-03-09 NOTE — PROGRESS NOTES
[address]  YOB: 1933          Assessment & Plan:   1. CIARAN  · Due to IVVD  · Cr is better  · IVF for now  · UA: No blood , no protein AND NOW lots of blood  · Repeat UA later   · Due to acidosis: hyperchloremic: add LR and dc NS  2. CKD 3  · CR 1.3 to 1.4 mg%  3. Acute Cholecystitis  · S/p surg today  4. HTN  · Not on ELLIS inhibition  5. GERD            6.    RA        Subjective:   CC:ARF  HPI: Patient seen   CIARAN is better, making urine. OR today. Progressively acidotic. Current Facility-Administered Medications   Medication Dose Route Frequency    HYDROmorphone (PF) (DILAUDID) injection 1 mg  1 mg IntraVENous Q4H PRN    hydrocortisone Sod Succ (PF) (SOLU-CORTEF) injection 100 mg  100 mg IntraVENous Q8H    pantoprazole (PROTONIX) 40 mg in sodium chloride 0.9 % 10 mL injection  40 mg IntraVENous Q12H    naloxone (NARCAN) injection 0.4 mg  0.4 mg IntraVENous PRN    0.9% sodium chloride infusion  100 mL/hr IntraVENous CONTINUOUS    acetaminophen (TYLENOL) tablet 650 mg  650 mg Oral Q4H PRN    diphenhydrAMINE (BENADRYL) injection 12.5 mg  12.5 mg IntraVENous Q4H PRN    ondansetron (ZOFRAN) injection 4 mg  4 mg IntraVENous Q4H PRN    piperacillin-tazobactam (ZOSYN) 3.375 g in 0.9% sodium chloride (MBP/ADV) 100 mL  3.375 g IntraVENous Q8H    albuterol-ipratropium (DUO-NEB) 2.5 MG-0.5 MG/3 ML  3 mL Nebulization Q6H PRN          Objective:     Vitals:  Blood pressure 132/50, pulse 72, temperature 97.9 °F (36.6 °C), resp. rate 10, height 5' 4\" (1.626 m), weight 82.1 kg (181 lb), SpO2 93 %, not currently breastfeeding.   Temp (24hrs), Av.8 °F (36.6 °C), Min:96.4 °F (35.8 °C), Max:98.9 °F (37.2 °C)      Intake and Output:   0701 - 03/09 1900  In: 1400 [I.V.:1400]  Out: 501 [Urine:380; Drains:21]  1901 -  07  In: 5560 [I.V.:3770]  Out: 2125 [Urine:1625]    Physical Exam:                Patient is intubated: no    Physical Examination:   GENERAL ASSESSMENT: NAD  HEENT:Nontraumatic   CHEST: CTA  HEART: S1S2  ABDOMEN: Soft,NT,           ECG/rhythm:    Data Review      No results for input(s): TNIPOC in the last 72 hours. No lab exists for component: ITNL   No results for input(s): CPK, CKMB, TROIQ in the last 72 hours. Recent Labs      03/09/17   0355  03/08/17   0529  03/07/17   1621   NA  143  138  132*   K  3.2*  3.6  3.9   CL  111*  104  97   CO2  16*  19*  21   BUN  50*  42*  31*   CREA  1.90*  2.18*  2.14*   GLU  122*  140*  155*   PHOS  4.1  4.8*   --    MG  2.8*  2.5*   --    CA  7.6*  7.9*  8.6   ALB  1.9*  2.1*  2.4*   WBC  25.6*  31.0*  40.4*   HGB  10.0*  10.8*  11.9   HCT  32.0*  34.2*  38.3   PLT  363  336  380      Recent Labs      03/09/17   0355 03/08/17   0529  03/07/17   1622   INR  1.3*  2.2*  1.9*   PTP  12.8*  22.7*  19.8*   APTT  34.4*   --    --      Needs: urine analysis, urine sodium, protein and creatinine  Lab Results   Component Value Date/Time    Sodium urine, random 32 03/08/2017 11:29 AM    Creatinine, urine 127.85 03/08/2017 11:29 AM         Discussed with:  pt    : Apollo Verdugo MD  3/9/2017        [facility]:  [email or web address]  [facility] office:  [address] [place] [address], [address] [place] [address] [address]  [place], [address] [facility]  Phone: [phone]  Fax :     [phone]    [facility] office:  [address] [facility], [address]  Phone - [phone]  Fax - [phone]

## 2017-03-09 NOTE — PROCEDURES
[facility]  *** FINAL REPORT ***    Name: Kristine Aguirre  MRN: [id number]    Inpatient  : 1933  HIS Order #: [id number]  [id number] [facility] Visit #: [id number]  Date: 09 Mar 2017    TYPE OF TEST: Peripheral Venous Testing    REASON FOR TEST  Pain in limb, Limb swelling    Right Leg:-  Deep venous thrombosis:           No  Superficial venous thrombosis:    No  Deep venous insufficiency:        No  Superficial venous insufficiency: No    Left Leg:-  Deep venous thrombosis:           No  Superficial venous thrombosis:    No  Deep venous insufficiency:        No  Superficial venous insufficiency: No      INTERPRETATION/FINDINGS  PROCEDURE:  BILATERAL LE VENOUS DUPLEX. Evaluation of lower extremity veins with ultrasound (B-mode imaging,  pulsed Doppler, color Doppler). Includes the common femoral, deep  femoral, femoral, popliteal, posterior tibial, peroneal, and great  saphenous veins. FINDINGS:  Carlean Friar scale and color flow duplex images of the veins in  both lower extremities demonstrate normal compressibility, spontaneous   and augmented flow profiles, and absence of filling defects  throughout the deep and superficial veins in both lower extremities. CONCLUSION:  Bilateral lower extremity venous duplex negative for deep   venous thrombosis or thrombophlebitis. ADDITIONAL COMMENTS    I have personally reviewed the data relevant to the interpretation of  this  study. TECHNOLOGIST: Sindi Ly RVT  Signed: 3/9/2017 4:39:34 PM    PHYSICIAN: Zuri Wyman MD  Signed: 3/10/2017 7:37:10 AM

## 2017-03-09 NOTE — PROGRESS NOTES
Pt transported to Surgery on monitor. All a.m. Medications sent with patient; including IV antibiotic and potassium supplements per overnight RN. 1359:  PACU called to give report. Pt had gallbladder removed. All runs of IV potassium completed. Pt now has 2 drains. Arroyo output reported as bonnie colored now. 1635:  Duplex of LE results negative for DVT per tech; notified Dr. Jemima Ferreira. Pt provided with an incentive spirometer, but continues to be drowsy and shows poor compliance; will continue to encourage. Diet resumed with clear liquid diet at this time. 1755:  Called for prn breathing treatment; pt is more awake and has audible expiratory wheezing. RR 13; O2 sats 97 on 2L NC.    1900:  Bedside and Verbal shift change report given to Katie Duvall RN  (oncoming nurse) by Teodora Luis RN (offgoing nurse).  Report included the following information SBAR, Kardex, OR Summary, Procedure Summary, Intake/Output, MAR, Recent Results and Cardiac Rhythm SR.

## 2017-03-09 NOTE — BRIEF OP NOTE
BRIEF OPERATIVE NOTE    Date of Procedure: 3/9/2017   Preoperative Diagnosis: cholecystitis  Postoperative Diagnosis: cholecystitis with gangrene of GB   Procedure(s):  CHOLECYSTECTOMY LAPAROSCOPIC  Surgeon(s) and Role:      * Izaiah Middleton MD - Primary            Surgical Staff:  Circ-1: Connor Harvey RN  Circ-Relief: Jewel Yeboah RN  Scrub Tech-1: Kasandra Hoffman RN-Relief: April Guero Steele RN  Scrub RN - Intern: Esteban Diallo RN  Surg Asst-1: Jean-Claude Reyes; Azam Watkins  Event Time In   Incision Start 1032   Incision Close      Anesthesia: General   Estimated Blood Loss: 100 ml  Specimens:   ID Type Source Tests Collected by Time Destination   1 : gallbladder Preservative Gallbladder  Izaiah Middleton MD 3/9/2017 4276 Pathology   1 : aerobic and anaerobic culture and sensitivty gall bladder fluid Body Fluid Gallbladder CULTURE, ANAEROBIC, CULTURE, BODY FLUID Izaiah Middleton MD 3/9/2017 1050 Microbiology      Findings: gangrenous GB    Complications: none  Implants: * No implants in log *

## 2017-03-09 NOTE — ANESTHESIA PREPROCEDURE EVALUATION
Anesthetic History   No history of anesthetic complications            Review of Systems / Medical History  Patient summary reviewed, nursing notes reviewed and pertinent labs reviewed    Pulmonary  Within defined limits        Pneumonia and shortness of breath         Neuro/Psych   Within defined limits           Cardiovascular  Within defined limits  Hypertension              Exercise tolerance: >4 METS     GI/Hepatic/Renal  Within defined limits              Endo/Other  Within defined limits      Arthritis     Other Findings   Comments: Severe pulmonary fibrosis on continuous home O2 and recent hospitalization for pneumonia, still with productive cough. Physical Exam    Airway  Mallampati: III    Neck ROM: normal range of motion   Mouth opening: Diminished (comment)     Cardiovascular  Regular rate and rhythm,  S1 and S2 normal,  no murmur, click, rub, or gallop  Rhythm: regular  Rate: normal         Dental  No notable dental hx       Pulmonary  Breath sounds clear to auscultation               Abdominal  GI exam deferred       Other Findings            Anesthetic Plan    ASA: 4  Anesthesia type: general        Post procedure ventilation   Induction: Intravenous  Anesthetic plan and risks discussed with: Patient      Discussed with patient risk of needing to remain intubated for a prolonged period of time after this procedure. Pulmonary saw patient yesterday for clearance. Recent hospitalization for pneumonia and recently placed on continuous home O2 for pulmonary fibrosis - fairly high risk of remaining intubated following general anesthetic today. In addition, discussed rescinding DNR order for duration of procedure and reinstating in PACU.

## 2017-03-09 NOTE — ANESTHESIA POSTPROCEDURE EVALUATION
Post-Anesthesia Evaluation and Assessment    Patient: Carolyn Cummings MRN: [id number]  SSN: [id number]    YOB: 1933  Age: 80 y.o. Sex: female       Cardiovascular Function/Vital Signs  Visit Vitals    /47    Pulse 80    Temp 36.4 °C (97.6 °F)    Resp 9    Ht 5' 4\" (1.626 m)    Wt 82.1 kg (181 lb)    SpO2 98%    Breastfeeding No    BMI 31.07 kg/m2       Patient is status post general anesthesia for Procedure(s):  CHOLECYSTECTOMY LAPAROSCOPIC. Nausea/Vomiting: None    Postoperative hydration reviewed and adequate. Pain:  Pain Scale 1: Numeric (0 - 10) (03/09/17 1348)  Pain Intensity 1: 6 (03/09/17 1348)   Managed    Neurological Status:   Neuro (WDL): Within Defined Limits (03/09/17 1328)  Neuro  Neurologic State: Alert;Eyes open spontaneously (03/09/17 1328)  Orientation Level: Oriented X4 (03/09/17 1328)  Cognition: Follows commands (03/09/17 1328)  Speech: Clear (03/09/17 1328)  Assessment L Pupil: Round;Brisk (03/08/17 2030)  Size L Pupil (mm): 3 (03/08/17 2030)  Assessment R Pupil: Round;Brisk (03/08/17 2030)  Size R Pupil (mm): 3 (03/08/17 2030)  LUE Motor Response: Purposeful (03/09/17 1328)  LLE Motor Response: Purposeful (03/09/17 1328)  RUE Motor Response: Purposeful (03/09/17 1328)  RLE Motor Response: Purposeful (03/09/17 1328)   At baseline    Mental Status and Level of Consciousness: Arousable    Pulmonary Status:   O2 Device: Nasal cannula (03/09/17 1329)   Adequate oxygenation and airway patent    Complications related to anesthesia: None    Post-anesthesia assessment completed.  No concerns    Signed By: Neil Blue DO     March 9, 2017

## 2017-03-10 ENCOUNTER — APPOINTMENT (OUTPATIENT)
Dept: GENERAL RADIOLOGY | Age: 82
DRG: 853 | End: 2017-03-10
Attending: INTERNAL MEDICINE
Payer: MEDICARE

## 2017-03-10 LAB
ALBUMIN SERPL BCP-MCNC: 1.9 G/DL (ref 3.5–5)
ALBUMIN/GLOB SERPL: 0.4 {RATIO} (ref 1.1–2.2)
ALP SERPL-CCNC: 81 U/L (ref 45–117)
ALT SERPL-CCNC: 32 U/L (ref 12–78)
ANION GAP BLD CALC-SCNC: 13 MMOL/L (ref 5–15)
APTT PPP: 29.2 SEC (ref 22.1–32.5)
APTT PPP: 69.6 SEC (ref 22.1–32.5)
APTT PPP: 71.8 SEC (ref 22.1–32.5)
AST SERPL W P-5'-P-CCNC: 42 U/L (ref 15–37)
BASOPHILS # BLD AUTO: 0 K/UL (ref 0–0.1)
BASOPHILS # BLD: 0 % (ref 0–1)
BILIRUB SERPL-MCNC: 0.4 MG/DL (ref 0.2–1)
BNP SERPL-MCNC: 1718 PG/ML (ref 0–450)
BUN SERPL-MCNC: 45 MG/DL (ref 6–20)
BUN/CREAT SERPL: 26 (ref 12–20)
CALCIUM SERPL-MCNC: 7.3 MG/DL (ref 8.5–10.1)
CHLORIDE SERPL-SCNC: 112 MMOL/L (ref 97–108)
CO2 SERPL-SCNC: 19 MMOL/L (ref 21–32)
CREAT SERPL-MCNC: 1.72 MG/DL (ref 0.55–1.02)
DIFFERENTIAL METHOD BLD: ABNORMAL
EOSINOPHIL # BLD: 0 K/UL (ref 0–0.4)
EOSINOPHIL NFR BLD: 0 % (ref 0–7)
ERYTHROCYTE [DISTWIDTH] IN BLOOD BY AUTOMATED COUNT: 21.1 % (ref 11.5–14.5)
GLOBULIN SER CALC-MCNC: 4.8 G/DL (ref 2–4)
GLUCOSE BLD STRIP.AUTO-MCNC: 150 MG/DL (ref 65–100)
GLUCOSE SERPL-MCNC: 139 MG/DL (ref 65–100)
HCT VFR BLD AUTO: 30 % (ref 35–47)
HGB BLD-MCNC: 8.7 G/DL (ref 11.5–16)
HGB BLD-MCNC: 8.9 G/DL (ref 11.5–16)
HGB BLD-MCNC: 8.9 G/DL (ref 11.5–16)
LYMPHOCYTES # BLD AUTO: 3 % (ref 12–49)
LYMPHOCYTES # BLD: 0.4 K/UL (ref 0.8–3.5)
MAGNESIUM SERPL-MCNC: 2.7 MG/DL (ref 1.6–2.4)
MCH RBC QN AUTO: 23.1 PG (ref 26–34)
MCHC RBC AUTO-ENTMCNC: 29 G/DL (ref 30–36.5)
MCV RBC AUTO: 79.6 FL (ref 80–99)
MONOCYTES # BLD: 0.6 K/UL (ref 0–1)
MONOCYTES NFR BLD AUTO: 4 % (ref 5–13)
NEUTS SEG # BLD: 13.7 K/UL (ref 1.8–8)
NEUTS SEG NFR BLD AUTO: 93 % (ref 32–75)
PHOSPHATE SERPL-MCNC: 3.1 MG/DL (ref 2.6–4.7)
PLATELET # BLD AUTO: 341 K/UL (ref 150–400)
POTASSIUM SERPL-SCNC: 3.6 MMOL/L (ref 3.5–5.1)
PROT SERPL-MCNC: 6.7 G/DL (ref 6.4–8.2)
RBC # BLD AUTO: 3.77 M/UL (ref 3.8–5.2)
RBC MORPH BLD: ABNORMAL
SERVICE CMNT-IMP: ABNORMAL
SODIUM SERPL-SCNC: 144 MMOL/L (ref 136–145)
THERAPEUTIC RANGE,PTTT: ABNORMAL SECS (ref 58–77)
THERAPEUTIC RANGE,PTTT: ABNORMAL SECS (ref 58–77)
THERAPEUTIC RANGE,PTTT: NORMAL SECS (ref 58–77)
WBC # BLD AUTO: 14.7 K/UL (ref 3.6–11)

## 2017-03-10 PROCEDURE — 36415 COLL VENOUS BLD VENIPUNCTURE: CPT | Performed by: INTERNAL MEDICINE

## 2017-03-10 PROCEDURE — 71010 XR CHEST PORT: CPT

## 2017-03-10 PROCEDURE — 74011250636 HC RX REV CODE- 250/636: Performed by: INTERNAL MEDICINE

## 2017-03-10 PROCEDURE — 74011000250 HC RX REV CODE- 250: Performed by: INTERNAL MEDICINE

## 2017-03-10 PROCEDURE — 82962 GLUCOSE BLOOD TEST: CPT

## 2017-03-10 PROCEDURE — 85025 COMPLETE CBC W/AUTO DIFF WBC: CPT | Performed by: INTERNAL MEDICINE

## 2017-03-10 PROCEDURE — 80053 COMPREHEN METABOLIC PANEL: CPT | Performed by: INTERNAL MEDICINE

## 2017-03-10 PROCEDURE — 84100 ASSAY OF PHOSPHORUS: CPT | Performed by: INTERNAL MEDICINE

## 2017-03-10 PROCEDURE — 65660000000 HC RM CCU STEPDOWN

## 2017-03-10 PROCEDURE — 83735 ASSAY OF MAGNESIUM: CPT | Performed by: INTERNAL MEDICINE

## 2017-03-10 PROCEDURE — 74011000250 HC RX REV CODE- 250: Performed by: PHYSICIAN ASSISTANT

## 2017-03-10 PROCEDURE — 94640 AIRWAY INHALATION TREATMENT: CPT

## 2017-03-10 PROCEDURE — 77010033678 HC OXYGEN DAILY

## 2017-03-10 PROCEDURE — 85730 THROMBOPLASTIN TIME PARTIAL: CPT | Performed by: INTERNAL MEDICINE

## 2017-03-10 PROCEDURE — 85018 HEMOGLOBIN: CPT | Performed by: INTERNAL MEDICINE

## 2017-03-10 PROCEDURE — C9113 INJ PANTOPRAZOLE SODIUM, VIA: HCPCS | Performed by: INTERNAL MEDICINE

## 2017-03-10 PROCEDURE — 83880 ASSAY OF NATRIURETIC PEPTIDE: CPT | Performed by: INTERNAL MEDICINE

## 2017-03-10 PROCEDURE — 74011250637 HC RX REV CODE- 250/637: Performed by: PHYSICIAN ASSISTANT

## 2017-03-10 PROCEDURE — 74011000258 HC RX REV CODE- 258: Performed by: INTERNAL MEDICINE

## 2017-03-10 RX ORDER — HYDROCORTISONE SODIUM SUCCINATE 100 MG/2ML
50 INJECTION, POWDER, FOR SOLUTION INTRAMUSCULAR; INTRAVENOUS EVERY 8 HOURS
Status: DISCONTINUED | OUTPATIENT
Start: 2017-03-10 | End: 2017-03-11

## 2017-03-10 RX ORDER — IPRATROPIUM BROMIDE AND ALBUTEROL SULFATE 2.5; .5 MG/3ML; MG/3ML
3 SOLUTION RESPIRATORY (INHALATION)
Status: DISCONTINUED | OUTPATIENT
Start: 2017-03-10 | End: 2017-03-12

## 2017-03-10 RX ORDER — HEPARIN SODIUM 10000 [USP'U]/100ML
16.5-36 INJECTION, SOLUTION INTRAVENOUS
Status: DISCONTINUED | OUTPATIENT
Start: 2017-03-10 | End: 2017-03-11

## 2017-03-10 RX ORDER — BUMETANIDE 0.25 MG/ML
1 INJECTION INTRAMUSCULAR; INTRAVENOUS 2 TIMES DAILY
Status: DISCONTINUED | OUTPATIENT
Start: 2017-03-10 | End: 2017-03-11

## 2017-03-10 RX ORDER — HYDROMORPHONE HYDROCHLORIDE 1 MG/ML
0.5 INJECTION, SOLUTION INTRAMUSCULAR; INTRAVENOUS; SUBCUTANEOUS
Status: DISCONTINUED | OUTPATIENT
Start: 2017-03-10 | End: 2017-03-15 | Stop reason: HOSPADM

## 2017-03-10 RX ORDER — OXYCODONE HYDROCHLORIDE 5 MG/1
5 TABLET ORAL
Status: DISCONTINUED | OUTPATIENT
Start: 2017-03-10 | End: 2017-03-15 | Stop reason: HOSPADM

## 2017-03-10 RX ADMIN — PIPERACILLIN SODIUM,TAZOBACTAM SODIUM 3.38 G: 3; .375 INJECTION, POWDER, FOR SOLUTION INTRAVENOUS at 08:59

## 2017-03-10 RX ADMIN — IPRATROPIUM BROMIDE AND ALBUTEROL SULFATE 3 ML: .5; 2.5 SOLUTION RESPIRATORY (INHALATION) at 05:28

## 2017-03-10 RX ADMIN — IPRATROPIUM BROMIDE AND ALBUTEROL SULFATE 3 ML: .5; 2.5 SOLUTION RESPIRATORY (INHALATION) at 11:25

## 2017-03-10 RX ADMIN — HEPARIN SODIUM AND DEXTROSE 16.5 UNITS/KG/HR: 10000; 5 INJECTION INTRAVENOUS at 22:44

## 2017-03-10 RX ADMIN — HYDROMORPHONE HYDROCHLORIDE 0.5 MG: 1 INJECTION, SOLUTION INTRAMUSCULAR; INTRAVENOUS; SUBCUTANEOUS at 21:33

## 2017-03-10 RX ADMIN — HYDROCORTISONE SODIUM SUCCINATE 50 MG: 100 INJECTION, POWDER, FOR SOLUTION INTRAMUSCULAR; INTRAVENOUS at 21:36

## 2017-03-10 RX ADMIN — HEPARIN SODIUM AND DEXTROSE 16.5 UNITS/KG/HR: 10000; 5 INJECTION INTRAVENOUS at 09:00

## 2017-03-10 RX ADMIN — IPRATROPIUM BROMIDE AND ALBUTEROL SULFATE 3 ML: .5; 2.5 SOLUTION RESPIRATORY (INHALATION) at 20:08

## 2017-03-10 RX ADMIN — BUMETANIDE 1 MG: 0.25 INJECTION, SOLUTION INTRAMUSCULAR; INTRAVENOUS at 11:55

## 2017-03-10 RX ADMIN — HYDROCORTISONE SODIUM SUCCINATE 50 MG: 100 INJECTION, POWDER, FOR SOLUTION INTRAMUSCULAR; INTRAVENOUS at 14:54

## 2017-03-10 RX ADMIN — HYDROMORPHONE HYDROCHLORIDE 1 MG: 1 INJECTION, SOLUTION INTRAMUSCULAR; INTRAVENOUS; SUBCUTANEOUS at 05:18

## 2017-03-10 RX ADMIN — PIPERACILLIN SODIUM,TAZOBACTAM SODIUM 3.38 G: 3; .375 INJECTION, POWDER, FOR SOLUTION INTRAVENOUS at 17:20

## 2017-03-10 RX ADMIN — PIPERACILLIN SODIUM,TAZOBACTAM SODIUM 3.38 G: 3; .375 INJECTION, POWDER, FOR SOLUTION INTRAVENOUS at 00:47

## 2017-03-10 RX ADMIN — ONDANSETRON 4 MG: 2 INJECTION INTRAMUSCULAR; INTRAVENOUS at 18:21

## 2017-03-10 RX ADMIN — BUMETANIDE 1 MG: 0.25 INJECTION, SOLUTION INTRAMUSCULAR; INTRAVENOUS at 17:21

## 2017-03-10 RX ADMIN — HYDROCORTISONE SODIUM SUCCINATE 100 MG: 100 INJECTION, POWDER, FOR SOLUTION INTRAMUSCULAR; INTRAVENOUS at 05:10

## 2017-03-10 RX ADMIN — SODIUM CHLORIDE 40 MG: 9 INJECTION INTRAMUSCULAR; INTRAVENOUS; SUBCUTANEOUS at 08:59

## 2017-03-10 RX ADMIN — IPRATROPIUM BROMIDE AND ALBUTEROL SULFATE 3 ML: .5; 2.5 SOLUTION RESPIRATORY (INHALATION) at 00:13

## 2017-03-10 RX ADMIN — SODIUM CHLORIDE 40 MG: 9 INJECTION INTRAMUSCULAR; INTRAVENOUS; SUBCUTANEOUS at 20:46

## 2017-03-10 RX ADMIN — OXYCODONE HYDROCHLORIDE 5 MG: 5 TABLET ORAL at 20:46

## 2017-03-10 RX ADMIN — SODIUM CHLORIDE, SODIUM LACTATE, POTASSIUM CHLORIDE, AND CALCIUM CHLORIDE 125 ML/HR: 600; 310; 30; 20 INJECTION, SOLUTION INTRAVENOUS at 08:17

## 2017-03-10 RX ADMIN — IPRATROPIUM BROMIDE AND ALBUTEROL SULFATE 3 ML: .5; 2.5 SOLUTION RESPIRATORY (INHALATION) at 15:36

## 2017-03-10 NOTE — PROGRESS NOTES
Lab for aPTT result was 69.9; therapeutic value and no change to rate of 15 mL/hr. Next lab draw at 2130.    1815:  Pt reports \"reflux\" after eating dinner; pt on scheduled IV protonix, prn Zofran provided. 1900:  Pt reported relief of reflux with zofran. Pt resting comfortably in bed. Bedside and Verbal shift change report given to  (oncoming nurse) by Blu Cortez RN (offgoing nurse). Report included the following information SBAR, Kardex, Procedure Summary, Intake/Output, MAR, Recent Results and Cardiac Rhythm SR. Next aTT and Hg lab draw at 2130.

## 2017-03-10 NOTE — PROGRESS NOTES
[facility]  [address] [facility], [place], [address] [place] [address]  [phone]      Medical Progress Note      NAME: Lorri Dugan   :  1933  MRM:  [id number]    Date/Time: 3/10/2017          Subjective:     Chief Complaint:  F/u acute cholecystitis    Chart/notes/labs/studies reviewed, patient examined at bedside. Reports RUQ pain. Dizzy. No CP. Objective:       Vitals:          Last 24hrs VS reviewed since prior progress note. Most recent are:    Visit Vitals    /62    Pulse 87    Temp 97.7 °F (36.5 °C)    Resp 11    Ht 5' 4\" (1.626 m)    Wt 90.7 kg (200 lb)    SpO2 94%    Breastfeeding No    BMI 34.33 kg/m2     SpO2 Readings from Last 6 Encounters:   03/10/17 94%   17 95%   11/18/15 93%   07/31/15 95%   13 99%    O2 Flow Rate (L/min): 2 l/min       Intake/Output Summary (Last 24 hours) at 03/10/17 0906  Last data filed at 03/10/17 3602   Gross per 24 hour   Intake          4542.08 ml   Output             1416 ml   Net          3126.08 ml          Exam:     Physical Exam:    Gen:  Elderly, well-developed, well-nourished, in no acute process. HEENT:  Sclerae nonicteric, hearing intact to voice, mucous membranes moist  Neck:  Supple, without masses  Resp:  No accessory muscle use, upper airway wheezing. Mildly diminished, faint bibasilar rales  Card: RRR, without m/r/g. Trace LE edema. Abd:  +bowel sounds, soft, mild RUQ TTP, nondistended. Drains in place, serosang fluid. Neuro: Face symmetric, tongue midline, speech fluent, follows commands appropriately  Psych:  Somnolent, arousable.  Oriented to self, hospital. Fair insight     Medications Reviewed: (see below)    Lab Data Reviewed: (see below)    ______________________________________________________________________    Medications:     Current Facility-Administered Medications   Medication Dose Route Frequency    hydrocortisone Sod Succ (PF) (SOLU-CORTEF) injection 50 mg  50 mg IntraVENous Q8H  heparin 25,000 units in D5W 250 ml infusion  16.5-36 Units/kg/hr IntraVENous TITRATE    albuterol-ipratropium (DUO-NEB) 2.5 MG-0.5 MG/3 ML  3 mL Nebulization QID RT    lactated ringers infusion  125 mL/hr IntraVENous CONTINUOUS    HYDROmorphone (PF) (DILAUDID) injection 1 mg  1 mg IntraVENous Q4H PRN    pantoprazole (PROTONIX) 40 mg in sodium chloride 0.9 % 10 mL injection  40 mg IntraVENous Q12H    naloxone (NARCAN) injection 0.4 mg  0.4 mg IntraVENous PRN    acetaminophen (TYLENOL) tablet 650 mg  650 mg Oral Q4H PRN    diphenhydrAMINE (BENADRYL) injection 12.5 mg  12.5 mg IntraVENous Q4H PRN    ondansetron (ZOFRAN) injection 4 mg  4 mg IntraVENous Q4H PRN    piperacillin-tazobactam (ZOSYN) 3.375 g in 0.9% sodium chloride (MBP/ADV) 100 mL  3.375 g IntraVENous Q8H            Lab Review:     Recent Labs      03/10/17   0350  03/09/17   0355  03/08/17   0529   WBC  14.7*  25.6*  31.0*   HGB  8.7*  10.0*  10.8*   HCT  30.0*  32.0*  34.2*   PLT  341  363  336     Recent Labs      03/10/17   0350  03/09/17   0355  03/08/17   0529  03/07/17   1622   NA  144  143  138   --    K  3.6  3.2*  3.6   --    CL  112*  111*  104   --    CO2  19*  16*  19*   --    GLU  139*  122*  140*   --    BUN  45*  50*  42*   --    CREA  1.72*  1.90*  2.18*   --    CA  7.3*  7.6*  7.9*   --    MG  2.7*  2.8*  2.5*   --    PHOS  3.1  4.1  4.8*   --    ALB  1.9*  1.9*  2.1*   --    SGOT  42*  24  28   --    ALT  32  18  18   --    INR   --   1.3*  2.2*  1.9*     No components found for: GLPOC  No results for input(s): PH, PCO2, PO2, HCO3, FIO2 in the last 72 hours.   Recent Labs      03/09/17   0355  03/08/17   0529  03/07/17   1622   INR  1.3*  2.2*  1.9*     No results found for: SDES  Lab Results   Component Value Date/Time    Culture result: PENDING 03/09/2017 10:52 AM    Culture result: NO GROWTH 1 DAY 03/08/2017 11:29 AM    Culture result: MRSA NOT PRESENT 03/07/2017 10:53 PM    Culture result:  03/07/2017 10:53 PM Screening of patient nares for MRSA is for surveillance purposes and, if positive, to facilitate isolation considerations in high risk settings. It is not intended for automatic decolonization interventions per se as regimens are not sufficiently effective to warrant routine use. Assessment / Plan:     79 yo WF w/ hx of IPF, HTN, DVT, recent admission for bronchitis p/w abdominal pain, found to have acute cholecystitis    Acute cholecystitis / Leukocytosis / Nausea & vomiting / Diarrhea: C. Diff negative. S/p lap guillermina, found to have gangrenous GB   -- cont IV pip/tazo  -- gen surgery followiong  -- may be too somnolent on current dose of Dilaudid. RR 9-11. Decrease dose. If remains a problem then change to PCA    ARF (acute renal failure) / Hyponatremia / CKD (chronic kidney disease) stage 3   -- monitor on IVF, on LR. Nephrology following     Chronic respiratory failure with hypoxia / IPF (idiopathic pulmonary fibrosis) : Noted on recent CT chest. Related to Reumatoid vs MTX damage? Pulmonology following  -- cont supplemental O2 and duo-nebs PRN     Rheumatoid arthritis: On low dose prednisone, holding methotrexate. With cholecystitis, and concerns of AI, stress steroids were started  -- cont stress-dose steroids, weaning slowly     Hx of deep venous thrombosis / Hx pulmonary embolism: On coumadin at home. Doppler extremity dopplers negative. -- discussed with Dr. Telly Padilla yesterday, okay to start anticoagulation (heparin gtt) this morning. No warfarin yet  -- monitor closely for bleeding, drop in Hg. Check Q6H with PTT.  Discussed with nursing    Hypertension  -- Holding Norvasc, Bystolic  -- wean stress dose steroids     Weakness / Debilitated patient  -- PT/OT to resume tomorrow     Anemia, unspecified: with component of acute blood loss anemia  -- monitor Hg as above      Esophageal reflux  --  Continue PPI      Total time spent with patient: 35 minutes                 Care Plan discussed with: Patient, Nursing Staff and >50% of time spent in counseling and coordination of care    Discussed:  Care Plan    Prophylaxis:  heparin    Disposition:  SNF/LTC           ___________________________________________________    Attending Physician: Marilyn Lozano MD

## 2017-03-10 NOTE — PROGRESS NOTES
General Surgery Daily Progress Note    Patient: Brayan Atkinson MRN: [id number]  SSN: [id number]    YOB: 1933  Age: 80 y.o. Sex: female      Admit Date: 3/7/2017    Subjective:   Confused this morning. Receiving dilaudid for pain. Tolerating clear liquids. Current Facility-Administered Medications   Medication Dose Route Frequency    hydrocortisone Sod Succ (PF) (SOLU-CORTEF) injection 50 mg  50 mg IntraVENous Q8H    heparin 25,000 units in D5W 250 ml infusion  16.5-36 Units/kg/hr IntraVENous TITRATE    albuterol-ipratropium (DUO-NEB) 2.5 MG-0.5 MG/3 ML  3 mL Nebulization QID RT    HYDROmorphone (PF) (DILAUDID) injection 0.5 mg  0.5 mg IntraVENous Q4H PRN    lactated ringers infusion  125 mL/hr IntraVENous CONTINUOUS    pantoprazole (PROTONIX) 40 mg in sodium chloride 0.9 % 10 mL injection  40 mg IntraVENous Q12H    naloxone (NARCAN) injection 0.4 mg  0.4 mg IntraVENous PRN    acetaminophen (TYLENOL) tablet 650 mg  650 mg Oral Q4H PRN    diphenhydrAMINE (BENADRYL) injection 12.5 mg  12.5 mg IntraVENous Q4H PRN    ondansetron (ZOFRAN) injection 4 mg  4 mg IntraVENous Q4H PRN    piperacillin-tazobactam (ZOSYN) 3.375 g in 0.9% sodium chloride (MBP/ADV) 100 mL  3.375 g IntraVENous Q8H        Objective:      03/08 1901 - 03/10 0700  In: 5823.8 [P.O.:540; I.V.:5283.8]  Out: 2166 [Urine:1905; Drains:161]  Patient Vitals for the past 8 hrs:   BP Temp Pulse Resp SpO2 Weight   03/10/17 0800 - 97.7 °F (36.5 °C) - - - -   03/10/17 0701 - - 87 - - -   03/10/17 0601 162/62 - 98 11 94 % -   03/10/17 0500 124/59 - 78 11 97 % 90.7 kg (200 lb)   03/10/17 0400 153/56 - 89 10 96 % -   03/10/17 0300 153/61 97.9 °F (36.6 °C) 86 10 97 % -   03/10/17 0200 150/72 - 72 11 95 % -       Physical Exam:  General: Alert, cooperative, NAD  Lungs: Wheezing, shallow  Heart:  Regular rate and rhythm  Abdomen: Soft, ATTP, mildly distended. + bowel sounds. Incisions c/d/i. VELMA drain x 2 SS.    Extremities: Warm, moves all  Skin:  Warm and dry, no rash    Labs: Recent Labs      03/10/17   0350   WBC  14.7*   HGB  8.7*   HCT  30.0*   PLT  341     Recent Labs      03/10/17   0350   NA  144   K  3.6   CL  112*   CO2  19*   GLU  139*   BUN  45*   CREA  1.72*   CA  7.3*   MG  2.7*   PHOS  3.1   ALB  1.9*   TBILI  0.4   SGOT  42*   ALT  32       Assessment / Plan:   · POD#1 lap guillermina  · Hgb down from 10 to 8.7. Trend H/H.  On Heparin gtt, hold off on Coumadin for now until hgb stabilizes  · Will advance diet later today  · PO pain medication, minimize narcotics  · Encourage IS  · Mobilize

## 2017-03-10 NOTE — PROGRESS NOTES
Occupational therapy note:  Orders received, chart reviewed. Spoke with nursing staff who reports patient with increased fluid and needs to be diuresed. Will follow up with patient at later time. Pretty Amor MS OTR/L

## 2017-03-10 NOTE — PROGRESS NOTES
[address]  YOB: 1933          Assessment & Plan:   1. CIARAN  · Due to IVVD, Low FeNa  · Cr is better  · IVF for now  · UA: No blood , no protein AND NOW lots of blood  · Repeat UA later   · Due to acidosis: hyperchloremic: added LR : better  2. CKD 3  · CR 1.3 to 1.4 mg%  3. Acute Cholecystitis  · S/p surg 3 9 17  4. HTN  · Not on ELLIS inhibition  5. GERD            6.    RA   Check back Monday. Call with ? Subjective:   CC:ARF  HPI: Patient seen   CIARAN is better, making urine. OR 3 9 17  Progressively acidotic. Current Facility-Administered Medications   Medication Dose Route Frequency    hydrocortisone Sod Succ (PF) (SOLU-CORTEF) injection 50 mg  50 mg IntraVENous Q8H    heparin 25,000 units in D5W 250 ml infusion  16.5-36 Units/kg/hr IntraVENous TITRATE    albuterol-ipratropium (DUO-NEB) 2.5 MG-0.5 MG/3 ML  3 mL Nebulization QID RT    HYDROmorphone (PF) (DILAUDID) injection 0.5 mg  0.5 mg IntraVENous Q4H PRN    lactated ringers infusion  125 mL/hr IntraVENous CONTINUOUS    pantoprazole (PROTONIX) 40 mg in sodium chloride 0.9 % 10 mL injection  40 mg IntraVENous Q12H    naloxone (NARCAN) injection 0.4 mg  0.4 mg IntraVENous PRN    acetaminophen (TYLENOL) tablet 650 mg  650 mg Oral Q4H PRN    diphenhydrAMINE (BENADRYL) injection 12.5 mg  12.5 mg IntraVENous Q4H PRN    ondansetron (ZOFRAN) injection 4 mg  4 mg IntraVENous Q4H PRN    piperacillin-tazobactam (ZOSYN) 3.375 g in 0.9% sodium chloride (MBP/ADV) 100 mL  3.375 g IntraVENous Q8H          Objective:     Vitals:  Blood pressure 162/62, pulse 87, temperature 97.7 °F (36.5 °C), resp. rate 11, height 5' 4\" (1.626 m), weight 90.7 kg (200 lb), SpO2 94 %, not currently breastfeeding.   Temp (24hrs), Av °F (36.7 °C), Min:97.5 °F (36.4 °C), Max:98.9 °F (37.2 °C)      Intake and Output:     1901 - 03/10 0700  In: 5823.8 [P.O.:540; I.V.:5283.8]  Out: 1662 [GRKZK:5098; Drains:161]    Physical Exam:                Patient is intubated:  no    Physical Examination:   GENERAL ASSESSMENT: NAD  HEENT:Nontraumatic   CHEST: CTA  HEART: S1S2  ABDOMEN: Soft,NT,           ECG/rhythm:    Data Review      No results for input(s): TNIPOC in the last 72 hours. No lab exists for component: ITNL   No results for input(s): CPK, CKMB, TROIQ in the last 72 hours. Recent Labs      03/10/17   0350 03/09/17   0355 03/08/17   0529   NA  144  143  138   K  3.6  3.2*  3.6   CL  112*  111*  104   CO2  19*  16*  19*   BUN  45*  50*  42*   CREA  1.72*  1.90*  2.18*   GLU  139*  122*  140*   PHOS  3.1  4.1  4.8*   MG  2.7*  2.8*  2.5*   CA  7.3*  7.6*  7.9*   ALB  1.9*  1.9*  2.1*   WBC  14.7*  25.6*  31.0*   HGB  8.7*  10.0*  10.8*   HCT  30.0*  32.0*  34.2*   PLT  341  363  336      Recent Labs      03/09/17 0355 03/08/17   0529  03/07/17   1622   INR  1.3*  2.2*  1.9*   PTP  12.8*  22.7*  19.8*   APTT  34.4*   --    --      Needs: urine analysis, urine sodium, protein and creatinine  Lab Results   Component Value Date/Time    Sodium urine, random 32 03/08/2017 11:29 AM    Creatinine, urine 127.85 03/08/2017 11:29 AM         Discussed with:  pt    : Sindi Lucero MD  3/10/2017        [facility]:  [email or web address]  [place] office:  [address] [place] [address] [address]  [place], [address] [facility]  Phone: [phone]  Fax :     [phone]    [place] office:  [address]  Phone - [phone]  Fax - [phone]

## 2017-03-10 NOTE — PROGRESS NOTES
I met @ length with pt,her two sons,and daughter. They are requesting I send her clinicals to both the [facility] and to [facility] I also gave them a list of local assisted living facilities to look into as a possibility for pt after rehab in SNF  One son lives locally . Another son & daughter live in [place]. Son is also looking into an AL in [place] for pt after she finishes rehab in SNF as another possibility. Another option pt .'s family is exploring is to have [facility] continue assisting pt in the son's home who lives locally. There are concerns that this son & daughter-in-law are becoming overwhelmed as pt.'s level of needs increases. Clinical[facility] [address] [facility] and [facility]. Family plans to visit both facilities to help them make an informed decision.     Sidney Deluca

## 2017-03-10 NOTE — PROGRESS NOTES
1900-Report received from offgoing nurse. Care assumed. Pt. Assessed. 2045-Dilaudid prn given for complaints of RUQ pain. Rates 8/10.  2100-Pain relieved. 0000-No change. 0400-No changes. 0530-Dilaudid given for c/o RUQ pain. Rates 8/10.   0600-Pt. Asleep.  0700-Report given to oncoming nurse. Questions answered. Care relinquished at this time.

## 2017-03-10 NOTE — ROUTINE PROCESS
Name: [facility]: [address]   : 1933 Admit Date: 3/7/2017   Phone: [phone]  Room: [facility]/   PCP: Marcella Cruz MD  MRN: [id number]   Date: 3/10/2017  Code: DNR          Chart and notes reviewed. Data reviewed. I review the patient's current medications in the medical record at each encounter. I have evaluated and examined the patient. Overnight events  Afebrile  Sats 99% on 2L  WBC 14.7 - improved  Hgb 8.7  Creat 1.72 - improved  BLE VD negative for DVT  PFTs with restriction    ROS: Feeling okay this morning. Abd is sore but pain is controlled. Denies fever or chills. Has mild wheeze. Denies CP. Denies LE pain/swelling. Current Facility-Administered Medications   Medication Dose Route Frequency    hydrocortisone Sod Succ (PF) (SOLU-CORTEF) injection 50 mg  50 mg IntraVENous Q8H    heparin 25,000 units in D5W 250 ml infusion  16.5-36 Units/kg/hr IntraVENous TITRATE    lactated ringers infusion  125 mL/hr IntraVENous CONTINUOUS    HYDROmorphone (PF) (DILAUDID) injection 1 mg  1 mg IntraVENous Q4H PRN    pantoprazole (PROTONIX) 40 mg in sodium chloride 0.9 % 10 mL injection  40 mg IntraVENous Q12H    naloxone (NARCAN) injection 0.4 mg  0.4 mg IntraVENous PRN    acetaminophen (TYLENOL) tablet 650 mg  650 mg Oral Q4H PRN    diphenhydrAMINE (BENADRYL) injection 12.5 mg  12.5 mg IntraVENous Q4H PRN    ondansetron (ZOFRAN) injection 4 mg  4 mg IntraVENous Q4H PRN    piperacillin-tazobactam (ZOSYN) 3.375 g in 0.9% sodium chloride (MBP/ADV) 100 mL  3.375 g IntraVENous Q8H    albuterol-ipratropium (DUO-NEB) 2.5 MG-0.5 MG/3 ML  3 mL Nebulization Q6H PRN         REVIEW OF SYSTEMS   Negative except as stated in the HPI.       Physical Exam:   Visit Vitals    /62    Pulse 87    Temp 97.7 °F (36.5 °C)    Resp 11    Ht 5' 4\" (1.626 m)    Wt 90.7 kg (200 lb)    SpO2 94%    Breastfeeding No    BMI 34.33 kg/m2       General:  Alert, cooperative, no distress, appears stated age. Head:  Normocephalic, without obvious abnormality, atraumatic. Eyes:  Conjunctivae/corneas clear. Nose: Nares normal. Septum midline. Mucosa normal.    Throat: Lips, mucosa, and tongue normal.    Neck: Supple, symmetrical, trachea midline, no adenopathy. Lungs:   Upper airway, end exp wheeze with fine inspiratory crackles in the bilaterally. No increased WOB. Chest wall:  No tenderness or deformity. Heart:  Regular rate and rhythm, S1, S2 normal, no murmur, click, rub or gallop. Abdomen:   Soft, mild tenderness to palpation, non-distended. Bowel sounds normal. No masses,  No organomegaly. Extremities: Extremities normal, atraumatic, no cyanosis or edema. Pulses: 2+ and symmetric all extremities. Skin: Skin color, texture, turgor normal. No rashes or lesions   Lymph nodes: Cervical, supraclavicular nodes normal.   Neurologic: Grossly nonfocal       Lab Results   Component Value Date/Time    Sodium 144 03/10/2017 03:50 AM    Potassium 3.6 03/10/2017 03:50 AM    Chloride 112 03/10/2017 03:50 AM    CO2 19 03/10/2017 03:50 AM    BUN 45 03/10/2017 03:50 AM    Creatinine 1.72 03/10/2017 03:50 AM    Glucose 139 03/10/2017 03:50 AM    Calcium 7.3 03/10/2017 03:50 AM    Magnesium 2.7 03/10/2017 03:50 AM    Phosphorus 3.1 03/10/2017 03:50 AM    Lactic acid 1.5 03/07/2017 05:38 PM       Lab Results   Component Value Date/Time    WBC 14.7 03/10/2017 03:50 AM    HGB 8.7 03/10/2017 03:50 AM    PLATELET 910 48/48/1673 03:50 AM    MCV 79.6 03/10/2017 03:50 AM       Lab Results   Component Value Date/Time    INR 1.3 03/09/2017 03:55 AM    aPTT 34.4 03/09/2017 03:55 AM    AST (SGOT) 42 03/10/2017 03:50 AM    Alk.  phosphatase 81 03/10/2017 03:50 AM    Protein, total 6.7 03/10/2017 03:50 AM    Albumin 1.9 03/10/2017 03:50 AM    Globulin 4.8 03/10/2017 03:50 AM       Lab Results   Component Value Date/Time    Iron 144 07/29/2015 02:40 AM    TIBC 307 07/29/2015 02:40 AM    Iron % saturation 47 07/29/2015 02:40 AM    Ferritin 232 07/29/2015 02:40 AM       Lab Results   Component Value Date/Time    Sed rate (ESR) 80 05/20/2013 05:00 AM        No results found for: PH, PHI, PCO2, PCO2I, PO2, PO2I, HCO3, HCO3I, FIO2, FIO2I    Lab Results   Component Value Date/Time    CK 57 11/18/2015 05:10 PM    CK-MB Index 2.6 11/18/2015 05:10 PM    Troponin-I, Qt. <0.04 03/01/2017 10:42 PM        Lab Results   Component Value Date/Time    Culture result: PENDING 03/09/2017 10:52 AM    Culture result: NO GROWTH 1 DAY 03/08/2017 11:29 AM    Culture result: MRSA NOT PRESENT 03/07/2017 10:53 PM    Culture result:  03/07/2017 10:53 PM         Screening of patient nares for MRSA is for surveillance purposes and, if positive, to facilitate isolation considerations in high risk settings. It is not intended for automatic decolonization interventions per se as regimens are not sufficiently effective to warrant routine use.        No results found for: TOXA1, RPR, HBCM, HBSAG, HAAB, HCAB, HCAB1, HAAT, G6PD, CRYAC, HIVGT, HIVR, HIV1, HIV12, HIVPC, HIVRPI    Lab Results   Component Value Date/Time    CK 57 11/18/2015 05:10 PM    CK 46 05/18/2013 12:50 PM       Lab Results   Component Value Date/Time    Color DARK YELLOW 03/08/2017 11:29 AM    Appearance TURBID 03/08/2017 11:29 AM    Specific gravity 1.020 05/18/2013 02:10 PM    pH (UA) 5.0 03/08/2017 11:29 AM    Protein 100 03/08/2017 11:29 AM    Glucose NEGATIVE  03/08/2017 11:29 AM    Ketone NEGATIVE  03/08/2017 11:29 AM    Bilirubin NEGATIVE  03/08/2017 11:29 AM    Blood LARGE 03/08/2017 11:29 AM    Urobilinogen 0.2 03/08/2017 11:29 AM    Nitrites NEGATIVE  03/08/2017 11:29 AM    Leukocyte Esterase NEGATIVE  03/08/2017 11:29 AM    WBC 5-10 03/08/2017 11:29 AM    RBC 10-20 03/08/2017 11:29 AM    Bacteria NEGATIVE  03/08/2017 11:29 AM       Images: no new images this morning    IMPRESSION  · Cholelithiasis with acute cholecystitis and gangrene of the gallbladder; s/p laparoscopic cholecystectomy     · Metabolic Acidosis: secondary to sepsis from cholecystitis: improved with LR  · Acute Pulmonary Edema: with new onset wheezing   · Acute/chronic renal failure: improved  · ILD: stable; may be secondary to RA   · Anemia with history of iron deficiency   · Hx of PE/DVT on Coumadin at baseline   · RA   · GERD   · HTN      PLAN  · Stop IVF  · Begin gentle diuresis with Bumex  · O2 if needed to keep sats > 90%; 2L O2 at baseline as needed with activity  · Duonebs to QID  · Continue stress dose steroids  · Surgery managing surgical wound  · Renal following  · IS  · Diet: clear liquids; advance per Surgery  · PT/OT  · Patient has been advised to stop methotrexate in the past as it may be contributing to her ILD. She has elected to continue taking it. Her ILD appears stable at this time. · Patient would benefit from outpatient pulmonary follow up. · GI prophylaxis: Protonix  · DVT prophylaxis: Coumadin when okay with surgery    SOHAN Dill     Pulmonary/Critical Care Attending Note  Pt seen and examined  I agree with the assessment/plan as outlined in Mrs. Serrano's note.     Pt with increased WOB and new onset wheezing today  CXR personally reviewed and consistent with developing acute pulmonary edema  --stop IVF and start diuresis with Bumex (d/w Renal)  Continue nebs and stress dose steroids  Surgery managing surgical wound  Encourage IS use  Daily mobilization  Nutrition: liquid diet: recs per Surgery  Dispo: keep in IVCU today    Collette Bakes, MD, [facility]  [facility]

## 2017-03-10 NOTE — PROGRESS NOTES
Physical therapy note:  Orders received, chart reviewed. Spoke with nursing staff who reports patient with increased fluid and needs to be diuresed. Will follow up with patient at later time. Thank you.

## 2017-03-11 ENCOUNTER — APPOINTMENT (OUTPATIENT)
Dept: GENERAL RADIOLOGY | Age: 82
DRG: 853 | End: 2017-03-11
Attending: INTERNAL MEDICINE
Payer: MEDICARE

## 2017-03-11 LAB
ALBUMIN SERPL BCP-MCNC: 1.9 G/DL (ref 3.5–5)
ALBUMIN/GLOB SERPL: 0.4 {RATIO} (ref 1.1–2.2)
ALP SERPL-CCNC: 62 U/L (ref 45–117)
ALT SERPL-CCNC: 33 U/L (ref 12–78)
ANION GAP BLD CALC-SCNC: 10 MMOL/L (ref 5–15)
APTT PPP: 127.7 SEC (ref 22.1–32.5)
APTT PPP: 55.1 SEC (ref 22.1–32.5)
APTT PPP: 56.6 SEC (ref 22.1–32.5)
APTT PPP: 59.5 SEC (ref 22.1–32.5)
AST SERPL W P-5'-P-CCNC: 25 U/L (ref 15–37)
BASOPHILS # BLD AUTO: 0 K/UL (ref 0–0.1)
BASOPHILS # BLD AUTO: 0 K/UL (ref 0–0.1)
BASOPHILS # BLD: 0 % (ref 0–1)
BASOPHILS # BLD: 0 % (ref 0–1)
BILIRUB SERPL-MCNC: 0.4 MG/DL (ref 0.2–1)
BUN SERPL-MCNC: 35 MG/DL (ref 6–20)
BUN/CREAT SERPL: 22 (ref 12–20)
CALCIUM SERPL-MCNC: 7.1 MG/DL (ref 8.5–10.1)
CHLORIDE SERPL-SCNC: 110 MMOL/L (ref 97–108)
CO2 SERPL-SCNC: 25 MMOL/L (ref 21–32)
CREAT SERPL-MCNC: 1.59 MG/DL (ref 0.55–1.02)
DIFFERENTIAL METHOD BLD: ABNORMAL
DIFFERENTIAL METHOD BLD: ABNORMAL
EOSINOPHIL # BLD: 0 K/UL (ref 0–0.4)
EOSINOPHIL # BLD: 0 K/UL (ref 0–0.4)
EOSINOPHIL NFR BLD: 0 % (ref 0–7)
EOSINOPHIL NFR BLD: 0 % (ref 0–7)
ERYTHROCYTE [DISTWIDTH] IN BLOOD BY AUTOMATED COUNT: 20.8 % (ref 11.5–14.5)
ERYTHROCYTE [DISTWIDTH] IN BLOOD BY AUTOMATED COUNT: 20.8 % (ref 11.5–14.5)
GLOBULIN SER CALC-MCNC: 4.5 G/DL (ref 2–4)
GLUCOSE SERPL-MCNC: 159 MG/DL (ref 65–100)
HCT VFR BLD AUTO: 28.2 % (ref 35–47)
HCT VFR BLD AUTO: 29.4 % (ref 35–47)
HCT VFR BLD AUTO: 29.4 % (ref 35–47)
HGB BLD-MCNC: 8.8 G/DL (ref 11.5–16)
HGB BLD-MCNC: 9 G/DL (ref 11.5–16)
HGB BLD-MCNC: 9.1 G/DL (ref 11.5–16)
LYMPHOCYTES # BLD AUTO: 10 % (ref 12–49)
LYMPHOCYTES # BLD AUTO: 3 % (ref 12–49)
LYMPHOCYTES # BLD: 0.4 K/UL (ref 0.8–3.5)
LYMPHOCYTES # BLD: 1.5 K/UL (ref 0.8–3.5)
MAGNESIUM SERPL-MCNC: 2 MG/DL (ref 1.6–2.4)
MCH RBC QN AUTO: 23.7 PG (ref 26–34)
MCH RBC QN AUTO: 24.2 PG (ref 26–34)
MCHC RBC AUTO-ENTMCNC: 30.6 G/DL (ref 30–36.5)
MCHC RBC AUTO-ENTMCNC: 31.2 G/DL (ref 30–36.5)
MCV RBC AUTO: 77.6 FL (ref 80–99)
MCV RBC AUTO: 77.7 FL (ref 80–99)
METAMYELOCYTES NFR BLD MANUAL: 1 %
MONOCYTES # BLD: 1.1 K/UL (ref 0–1)
MONOCYTES # BLD: 1.2 K/UL (ref 0–1)
MONOCYTES NFR BLD AUTO: 8 % (ref 5–13)
MONOCYTES NFR BLD AUTO: 8 % (ref 5–13)
NEUTS BAND NFR BLD MANUAL: 1 % (ref 0–6)
NEUTS BAND NFR BLD MANUAL: 2 % (ref 0–6)
NEUTS SEG # BLD: 11.9 K/UL (ref 1.8–8)
NEUTS SEG # BLD: 11.9 K/UL (ref 1.8–8)
NEUTS SEG NFR BLD AUTO: 81 % (ref 32–75)
NEUTS SEG NFR BLD AUTO: 86 % (ref 32–75)
PHOSPHATE SERPL-MCNC: 2.9 MG/DL (ref 2.6–4.7)
PLATELET # BLD AUTO: 329 K/UL (ref 150–400)
PLATELET # BLD AUTO: 336 K/UL (ref 150–400)
POTASSIUM SERPL-SCNC: 2.3 MMOL/L (ref 3.5–5.1)
PROT SERPL-MCNC: 6.4 G/DL (ref 6.4–8.2)
RBC # BLD AUTO: 3.63 M/UL (ref 3.8–5.2)
RBC # BLD AUTO: 3.79 M/UL (ref 3.8–5.2)
RBC MORPH BLD: ABNORMAL
SODIUM SERPL-SCNC: 145 MMOL/L (ref 136–145)
THERAPEUTIC RANGE,PTTT: ABNORMAL SECS (ref 58–77)
WBC # BLD AUTO: 13.5 K/UL (ref 3.6–11)
WBC # BLD AUTO: 14.6 K/UL (ref 3.6–11)
WBC MORPH BLD: ABNORMAL

## 2017-03-11 PROCEDURE — 65660000000 HC RM CCU STEPDOWN

## 2017-03-11 PROCEDURE — 85025 COMPLETE CBC W/AUTO DIFF WBC: CPT | Performed by: INTERNAL MEDICINE

## 2017-03-11 PROCEDURE — 85730 THROMBOPLASTIN TIME PARTIAL: CPT | Performed by: INTERNAL MEDICINE

## 2017-03-11 PROCEDURE — 74011000250 HC RX REV CODE- 250: Performed by: INTERNAL MEDICINE

## 2017-03-11 PROCEDURE — 74011250636 HC RX REV CODE- 250/636: Performed by: INTERNAL MEDICINE

## 2017-03-11 PROCEDURE — 74011000250 HC RX REV CODE- 250: Performed by: PHYSICIAN ASSISTANT

## 2017-03-11 PROCEDURE — 77010033678 HC OXYGEN DAILY

## 2017-03-11 PROCEDURE — 97530 THERAPEUTIC ACTIVITIES: CPT

## 2017-03-11 PROCEDURE — 97166 OT EVAL MOD COMPLEX 45 MIN: CPT

## 2017-03-11 PROCEDURE — 71010 XR CHEST PORT: CPT

## 2017-03-11 PROCEDURE — 74011250637 HC RX REV CODE- 250/637: Performed by: INTERNAL MEDICINE

## 2017-03-11 PROCEDURE — 80053 COMPREHEN METABOLIC PANEL: CPT | Performed by: INTERNAL MEDICINE

## 2017-03-11 PROCEDURE — 74011000258 HC RX REV CODE- 258: Performed by: INTERNAL MEDICINE

## 2017-03-11 PROCEDURE — 94640 AIRWAY INHALATION TREATMENT: CPT

## 2017-03-11 PROCEDURE — 85018 HEMOGLOBIN: CPT | Performed by: INTERNAL MEDICINE

## 2017-03-11 PROCEDURE — 77030013140 HC MSK NEB VYRM -A

## 2017-03-11 PROCEDURE — C9113 INJ PANTOPRAZOLE SODIUM, VIA: HCPCS | Performed by: INTERNAL MEDICINE

## 2017-03-11 PROCEDURE — 83735 ASSAY OF MAGNESIUM: CPT | Performed by: INTERNAL MEDICINE

## 2017-03-11 PROCEDURE — 84100 ASSAY OF PHOSPHORUS: CPT | Performed by: INTERNAL MEDICINE

## 2017-03-11 PROCEDURE — 36415 COLL VENOUS BLD VENIPUNCTURE: CPT | Performed by: INTERNAL MEDICINE

## 2017-03-11 PROCEDURE — 97535 SELF CARE MNGMENT TRAINING: CPT

## 2017-03-11 RX ORDER — HYDROCORTISONE SODIUM SUCCINATE 100 MG/2ML
25 INJECTION, POWDER, FOR SOLUTION INTRAMUSCULAR; INTRAVENOUS EVERY 8 HOURS
Status: COMPLETED | OUTPATIENT
Start: 2017-03-11 | End: 2017-03-12

## 2017-03-11 RX ORDER — BUMETANIDE 0.25 MG/ML
1 INJECTION INTRAMUSCULAR; INTRAVENOUS DAILY
Status: DISCONTINUED | OUTPATIENT
Start: 2017-03-12 | End: 2017-03-12

## 2017-03-11 RX ORDER — POTASSIUM CHLORIDE 750 MG/1
40 TABLET, FILM COATED, EXTENDED RELEASE ORAL
Status: COMPLETED | OUTPATIENT
Start: 2017-03-11 | End: 2017-03-11

## 2017-03-11 RX ORDER — AMLODIPINE BESYLATE 5 MG/1
5 TABLET ORAL DAILY
Status: DISCONTINUED | OUTPATIENT
Start: 2017-03-11 | End: 2017-03-12

## 2017-03-11 RX ORDER — POTASSIUM CHLORIDE 750 MG/1
40 TABLET, FILM COATED, EXTENDED RELEASE ORAL ONCE
Status: COMPLETED | OUTPATIENT
Start: 2017-03-11 | End: 2017-03-11

## 2017-03-11 RX ADMIN — SODIUM CHLORIDE 40 MG: 9 INJECTION INTRAMUSCULAR; INTRAVENOUS; SUBCUTANEOUS at 08:44

## 2017-03-11 RX ADMIN — PIPERACILLIN SODIUM,TAZOBACTAM SODIUM 3.38 G: 3; .375 INJECTION, POWDER, FOR SOLUTION INTRAVENOUS at 17:33

## 2017-03-11 RX ADMIN — SODIUM CHLORIDE 40 MG: 9 INJECTION INTRAMUSCULAR; INTRAVENOUS; SUBCUTANEOUS at 21:06

## 2017-03-11 RX ADMIN — HYDROCORTISONE SODIUM SUCCINATE 25 MG: 100 INJECTION, POWDER, FOR SOLUTION INTRAMUSCULAR; INTRAVENOUS at 21:05

## 2017-03-11 RX ADMIN — HYDROCORTISONE SODIUM SUCCINATE 25 MG: 100 INJECTION, POWDER, FOR SOLUTION INTRAMUSCULAR; INTRAVENOUS at 13:31

## 2017-03-11 RX ADMIN — HEPARIN SODIUM AND DEXTROSE 14.5 UNITS/KG/HR: 10000; 5 INJECTION INTRAVENOUS at 19:31

## 2017-03-11 RX ADMIN — HYDROCORTISONE SODIUM SUCCINATE 50 MG: 100 INJECTION, POWDER, FOR SOLUTION INTRAMUSCULAR; INTRAVENOUS at 04:56

## 2017-03-11 RX ADMIN — AMLODIPINE BESYLATE 5 MG: 5 TABLET ORAL at 13:27

## 2017-03-11 RX ADMIN — POTASSIUM CHLORIDE 40 MEQ: 750 TABLET, FILM COATED, EXTENDED RELEASE ORAL at 04:55

## 2017-03-11 RX ADMIN — IPRATROPIUM BROMIDE AND ALBUTEROL SULFATE 3 ML: .5; 2.5 SOLUTION RESPIRATORY (INHALATION) at 07:17

## 2017-03-11 RX ADMIN — HEPARIN SODIUM AND DEXTROSE 14.5 UNITS/KG/HR: 10000; 5 INJECTION INTRAVENOUS at 15:34

## 2017-03-11 RX ADMIN — PIPERACILLIN SODIUM,TAZOBACTAM SODIUM 3.38 G: 3; .375 INJECTION, POWDER, FOR SOLUTION INTRAVENOUS at 08:42

## 2017-03-11 RX ADMIN — BUMETANIDE 1 MG: 0.25 INJECTION, SOLUTION INTRAMUSCULAR; INTRAVENOUS at 08:46

## 2017-03-11 RX ADMIN — POTASSIUM CHLORIDE 40 MEQ: 750 TABLET, FILM COATED, EXTENDED RELEASE ORAL at 13:29

## 2017-03-11 RX ADMIN — HYDROMORPHONE HYDROCHLORIDE 0.5 MG: 1 INJECTION, SOLUTION INTRAMUSCULAR; INTRAVENOUS; SUBCUTANEOUS at 19:38

## 2017-03-11 RX ADMIN — ACETAMINOPHEN 650 MG: 325 TABLET ORAL at 19:35

## 2017-03-11 RX ADMIN — IPRATROPIUM BROMIDE AND ALBUTEROL SULFATE 3 ML: .5; 2.5 SOLUTION RESPIRATORY (INHALATION) at 11:09

## 2017-03-11 RX ADMIN — ACETAMINOPHEN 650 MG: 325 TABLET ORAL at 08:40

## 2017-03-11 RX ADMIN — HYDROMORPHONE HYDROCHLORIDE 0.5 MG: 1 INJECTION, SOLUTION INTRAMUSCULAR; INTRAVENOUS; SUBCUTANEOUS at 08:36

## 2017-03-11 RX ADMIN — PIPERACILLIN SODIUM,TAZOBACTAM SODIUM 3.38 G: 3; .375 INJECTION, POWDER, FOR SOLUTION INTRAVENOUS at 02:17

## 2017-03-11 RX ADMIN — IPRATROPIUM BROMIDE AND ALBUTEROL SULFATE 3 ML: .5; 2.5 SOLUTION RESPIRATORY (INHALATION) at 19:55

## 2017-03-11 NOTE — PROCEDURES
Garcia Dudley tammy [address]   [address] [facility], [address]   [facility]       Name:  Akil Rowley   MR#:  [id number]   :  1933   Account #:  [de-identified]    Date of Procedure:  2017   Date of Adm:  2017            REFERRING PHYSICIAN:   Meagan Sheets MD         CLINICAL DIAGNOSIS/INDICATIONS:   Preoperative pulmonary clearance, in   a patient with a history of interstitial lung disease and presenting with   acute cholecystitis. INTERPRETATION:   The clinical spirometry is suggestive of a   restrictive defect and the FEV1/FVC ratio is elevated. Both the FEV1   and FVC are severely reduced. True restriction cannot be commented   upon without performing lung volumes. Clinical correlation is advised.          Theotis Galeazzi, MD      SR / DV   D:  03/10/2017   14:39   T:  03/10/2017   21:57   Job #:  [id number]

## 2017-03-11 NOTE — PROGRESS NOTES
Problem: Self Care Deficits Care Plan (Adult)  Goal: *Acute Goals and Plan of Care (Insert Text)  Occupational Therapy Goals  Initiated 3/11/2017  1. Patient will perform grooming and bathing in seated position with supervision/set-up within 7 day(s). 2. Patient will perform upper body dressing with minimal assistance/contact guard assist within 7 day(s). 3. Patient will perform lower body dressing with maximal assistance within 7 day(s). 4. Patient will perform BSC/toilet transfers with maximal assistance within 7 day(s). 5. Patient will perform all aspects of toileting with maximal assistance within 7 day(s). 6. Patient will participate in upper extremity therapeutic exercise/activities with supervision/set-up and two rest breaks for 8 minutes within 7 day(s). OCCUPATIONAL THERAPY EVALUATION  Patient: Annie Payne (44 y.o. female)  Date: 3/11/2017  Primary Diagnosis: Acute cholecystitis  gallstones  Procedure(s) (LRB):  CHOLECYSTECTOMY LAPAROSCOPIC (N/A) 2 Days Post-Op   Precautions:   Fall, DNR      ASSESSMENT :  Based on the objective data described below, the patient presents with admission for cholecystitis, POD #2 from lap cholecystectomy. Patient was at [facility] 3/3-3/7 and discharged home with [facility] care. Patient presents weak, alert and states no specific c/o pain at rest. Patient requires max assist to roll and attain EOB. Once in seated position at edge of bed, patient requires high guard to maintain seated position with noted posterior lean. EOB limited by bowel urgency, returned to supine for use of bedpan. Following clean up patient declines further attempts at bed mobility. BUE ROM and strength decreased, BUE shoulder ROM limited and noted difficulty employing LUE with attempting to assist with log roll and supine to sit. Family present and expressing concerns for discharge planning for patient.  Medically complex patient with interstitial lung disease, PMH of back and knee surgery, noted decline over past two years with decline in participation in daily activities and decreased mobility. Patient will greatly benefit from discharge to rehab for further evaluation for living situation over period of time when patient is able to better participate. .     Patient will benefit from skilled intervention to address the above impairments. Patients rehabilitation potential is considered to be Fair  Factors which may influence rehabilitation potential include:   [X]             None noted  [ ]             Mental ability/status  [ ]             Medical condition  [ ]             Home/family situation and support systems  [ ]             Safety awareness  [ ]             Pain tolerance/management  [ ]             Other:        PLAN :  Recommendations and Planned Interventions:  [X]               Self Care Training                  [X]        Therapeutic Activities  [X]               Functional Mobility Training    [ ]        Cognitive Retraining  [ ]               Therapeutic Exercises           [X]        Endurance Activities  [ ]               Balance Training                   [ ]        Neuromuscular Re-Education  [ ]               Visual/Perceptual Training     [X]   Home Safety Training  [X]               Patient Education                 [X]        Family Training/Education  [ ]               Other (comment):     Frequency/Duration: Patient will be followed by occupational therapy 3 times a week to address goals. Discharge Recommendations: Rehab  Further Equipment Recommendations for Discharge: TBD at rehab       SUBJECTIVE:   Patient stated I have had enough today, I am done.       OBJECTIVE DATA SUMMARY:   HISTORY:   Past Medical History:   Diagnosis Date    Acid reflux      Arthritis      Hx pulmonary embolism      Hypertension      IPF (idiopathic pulmonary fibrosis) ([facility])       Past Surgical History:   Procedure Laterality Date    HX HYSTERECTOMY        HX KNEE REPLACEMENT         right    HX LUMBAR FUSION        HX TONSILLECTOMY            Prior Level of Function/Home Situation: Mod I with functional task mobility, I with basic ADLS; noted decline in activity and [place] for activity over past two years  Expanded or extensive additional review of patient history:      Home Situation  Home Environment: Private residence  # Steps to Enter: 3  Rails to Enter: Yes  Hand Rails : Right  One/Two Story Residence: Two story  # of Interior Steps: 14  Lift Chair Available: Yes  Living Alone: No  Support Systems: Child(jazmin) (lives with son and daughter in law)  Patient Expects to be Discharged to[de-identified] Private residence  Current DME Used/Available at Home: [address], rolling, Grab bars, Wheelchair, Oxygen, portable  Tub or Shower Type: Shower  [X]  Right hand dominant             [ ]  Left hand dominant     EXAMINATION OF PERFORMANCE DEFICITS:  Cognitive/Behavioral Status:  Neurologic State: Alert  Orientation Level: Oriented to person;Oriented to place;Oriented to situation  Cognition: Follows commands  Perception: Appears intact  Perseveration: No perseveration noted  Safety/Judgement: Fall prevention;Home safety; Awareness of environment  Skin: appears intact UE  Edema: none noted UE  Hearing: Auditory  Auditory Impairment: None  Hearing Aids/Status: Does not own  Vision/Perceptual:    Tracking: Able to track stimulus in all quadrants w/o difficulty                 Diplopia: No    Acuity: Within Defined Limits;Able to read clock/calendar on wall without difficulty    Corrective Lenses: Glasses  Range of Motion:     AROM: Generally decreased, functional (limited extension biolateral shoulders)                       Strength:     Strength: Generally decreased, functional (BUE)              Coordination:  Coordination: Within functional limits (BUE)  Fine Motor Skills-Upper: Left Intact; Right Intact    Gross Motor Skills-Upper: Left Impaired;Right Impaired (limited ROM with pain noted)  Tone & Sensation:     Tone: Normal (BUE)  Sensation: Intact (BUE)                       Balance:  Sitting: Impaired;High guard; With support  Sitting - Static: Fair (occasional)  Sitting - Dynamic: Fair (occasional)  Standing:  (NT, three episodes of bowel incontinence)     Functional Mobility and Transfers for ADLs:  Bed Mobility:  Rolling: Maximum assistance;Assist x1;Additional time  Supine to Sit: Maximum assistance;Assist x1;Additional time  Sit to Supine: Maximum assistance;Assist x1;Additional time     Transfers: Toilet Transfer :  (NT)     ADL Assessment:  Feeding: Setup     Oral Facial Hygiene/Grooming: Setup     Bathing: Moderate assistance (for LE)     Upper Body Dressing: Moderate assistance;Assist x1;Additional time                 Homemaking: Total assistance;Assist x1;Additional time (decreased forward trunk flexion)           ADL Intervention and task modifications:    Patient instructed and indicated understanding the benefits of maintaining activity tolerance, functional mobility, and independence with self care tasks during acute stay to ensure safe return home and to baseline. Encouraged patient to increase frequency and duration OOB, be out of bed for all meals, perform daily ADLs (as approved by RN/MD regarding bathing etc), and performing functional mobility to/from bathroom with assistance. Cognitive Retraining  Safety/Judgement: Fall prevention;Home safety; Awareness of environment        Functional Measure:  Barthel Index:      Bathin  Bladder: 5  Bowels: 0  Groomin  Dressin  Feeding: 10  Mobility: 5  Stairs: 0  Toilet Use: 0  Transfer (Bed to Chair and Back): 5  Total: 35         Barthel and G-code impairment scale:  Percentage of impairment CH  0% CI  1-19% CJ  20-39% CK  40-59% CL  60-79% CM  80-99% CN  100%   Barthel Score 0-100 100 99-80 79-60 59-40 20-39 1-19    0   Barthel Score 0-20 20 17-19 13-16 9-12 5-8 1-4 0      The Barthel ADL Index: Guidelines  1.  The index should be used as a record of what a patient does, not as a record of what a patient could do. 2. The main aim is to establish degree of independence from any help, physical or verbal, however minor and for whatever reason. 3. The need for supervision renders the patient not independent. 4. A patient's performance should be established using the best available evidence. Asking the patient, friends/relatives and nurses are the usual sources, but direct observation and common sense are also important. However direct testing is not needed. 5. Usually the patient's performance over the preceding 24-48 hours is important, but occasionally longer periods will be relevant. 6. Middle categories imply that the patient supplies over 50 per cent of the effort. 7. Use of aids to be independent is allowed. Cesar Jimenez., Barthel, DSB. (0934). Functional evaluation: the Barthel Index. [facility] (14)2. Avis Stinson fauzia DANIEL Chambers, Delilah Dyer., Sandra Ochoa., [place], [address] [place] [address] (1999). Measuring the change indisability after inpatient rehabilitation; comparison of the responsiveness of the Barthel Index and Functional [place] Measure. Journal of Neurology, Neurosurgery, and Psychiatry, 66(4), 225-208. Timothy Perez, N.J.A, MICAELA Hull.J.M, & Feli Pruitt MZAYDA. (2004.) Assessment of post-stroke quality of life in cost-effectiveness studies: The usefulness of the Barthel Index and the EuroQoL-5D. Quality of Life Research, 13, 100-03         G codes: In compliance with CMSs Claims Based Outcome Reporting, the following G-code set was chosen for this patient based on their primary functional limitation being treated: The outcome measure chosen to determine the severity of the functional limitation was the Barthel with a score of 35/100 which was correlated with the impairment scale.       · Other PT/OT Primary Functional Limitations:               - CURRENT STATUS:    CL - 60%-79% impaired, limited or restricted               - GOAL STATUS:           CK - 40%-59% impaired, limited or restricted               - D/C STATUS:                       ---------------To be determined---------------      Occupational Therapy Evaluation Charge Determination   History Examination Decision-Making   MEDIUM Complexity : Expanded review of history including physical, cognitive and psychosocial  history  MEDIUM Complexity : 3-5 performance deficits relating to physical, cognitive , or psychosocial skils that result in activity limitations and / or participation restrictions MEDIUM Complexity : Patient may present with comorbidities that affect occupational performnce. Miniml to moderate modification of tasks or assistance (eg, physical or verbal ) with assesment(s) is necessary to enable patient to complete evaluation       Based on the above components, the patient evaluation is determined to be of the following complexity level: MEDIUM  Pain:  Pain Scale 1: Numeric (0 - 10)  Pain Intensity 1: 0  Pain Location 1: Abdomen;Back     Pain Description 1: Aching  Pain Intervention(s) 1: Medication (see MAR)  Activity Tolerance:   Patient tolerates max assist to EOB, 4 minutes EOB with high guard noted posterior lean  Please refer to the flowsheet for vital signs taken during this treatment. After treatment:   [ ] Patient left in no apparent distress sitting up in chair  [X] Patient left in no apparent distress in bed in semi chair position  [X] Call bell left within reach  [X] Nursing notified  [X] Caregiver present  [ ] Bed alarm activated      COMMUNICATION/EDUCATION:   The patients plan of care was discussed with: Physical Therapist, Registered Nurse and . [X] Home safety education was provided and the patient/caregiver indicated understanding. [X] Patient/family have participated as able in goal setting and plan of care. [X] Patient/family agree to work toward stated goals and plan of care.   [ ] Patient understands intent and goals of therapy, but is neutral about his/her participation. [ ] Patient is unable to participate in goal setting and plan of care. This patients plan of care is appropriate for delegation to [facility].      Thank you for this referral.  Joshua Mattson, OT  Time Calculation: 70 mins

## 2017-03-11 NOTE — PROGRESS NOTES
Problem: Surgical Pathway: Discharge Outcomes  Goal: *Tolerating diet  Outcome: Not Progressing Towards Goal  Patient has poor appetite, ate 0% of breakfast

## 2017-03-11 NOTE — PROGRESS NOTES
Attempted to work with Pt twice this morning - Pt having had soiled her bed and needed to be cleaned up. Nursing advised. Will follow.

## 2017-03-11 NOTE — PROGRESS NOTES
1910: Bedside and Verbal shift change report given to [address] [place] [address] (oncoming nurse) by Abimbola Nathan RN (offgoing nurse). Report included the following information SBAR, Kardex and Cardiac Rhythm NSR. TRANSFER - OUT REPORT:    Verbal report given to Pru RN(name) on Dmitriy Razo  being transferred to [facility](unit) for routine progression of care       Report consisted of patients Situation, Background, Assessment and   Recommendations(SBAR). Information from the following report(s) SBAR and Kardex was reviewed with the receiving nurse. Lines:   Peripheral IV 03/07/17 Left Wrist (Active)   Site Assessment Clean, dry, & intact 3/10/2017  8:00 AM   Phlebitis Assessment 0 3/10/2017  8:00 AM   Infiltration Assessment 0 3/10/2017  8:00 AM   Dressing Status Clean, dry, & intact 3/10/2017  8:00 AM   Dressing Type Transparent 3/10/2017  8:00 AM   Hub Color/Line Status Pink 3/10/2017  8:00 AM   Action Taken Open ports on tubing capped 3/10/2017  8:00 AM   Alcohol Cap Used Yes 3/10/2017  8:00 AM       Peripheral IV 03/09/17 Right Wrist (Active)   Site Assessment Clean, dry, & intact 3/10/2017  8:00 AM   Phlebitis Assessment 0 3/10/2017  8:00 AM   Infiltration Assessment 0 3/10/2017  8:00 AM   Dressing Status Clean, dry, & intact 3/10/2017  8:00 AM   Dressing Type Transparent 3/10/2017  8:00 AM   Hub Color/Line Status Pink 3/10/2017  8:00 AM   Action Taken Open ports on tubing capped 3/10/2017  8:00 AM   Alcohol Cap Used Yes 3/10/2017  8:00 AM        Opportunity for questions and clarification was provided. Patient transported with:   Monitor  O2 @ 2 liters  Registered Nurse      Spoke with patient's son, Renetta Wong, informed him that this patient was moved to room 336 on [facility] unit.

## 2017-03-11 NOTE — PROGRESS NOTES
Surgery    Pt reports feeling lousy this am.  States has had diarrhea after eating ice cream.  AF, VSS  Abdomen soft, nd, appropriately ttp  Labs OK  Plan: discussed with patient possible quick transit of fattier foods now that she has undergone cholecystectomy  Continue management  Tolerating diet  Will sign off  Please reconsult as necessary

## 2017-03-11 NOTE — PROGRESS NOTES
[facility]  INTENSIVIST DAILY PROGRESS NOTE  Name: Chloé Moore   : 1933   MRN: [id number]   Date: 3/11/2017 11:15 AM   I have reviewed the flowsheet and previous days notes. The patient says her breathing is better. Back on her baseline 2 LPM O2. Very weak. To get PT. Vital Signs:    Visit Vitals    /60 (BP 1 Location: Left arm, BP Patient Position: At rest)    Pulse 83    Temp 99.2 °F (37.3 °C)    Resp 17    Ht 5' 4\" (1.626 m)    Wt 93 kg (205 lb)    SpO2 96%    Breastfeeding No    BMI 35.19 kg/m2       O2 Device: Nasal cannula   O2 Flow Rate (L/min): 2 l/min   Temp (24hrs), Av.4 °F (36.9 °C), Min:97.6 °F (36.4 °C), Max:99.6 °F (37.6 °C)       Intake/Output:   Last shift:       07 -  1900  In: 480 [P.O.:480]  Out: -   Last 3 shifts:  1901 -  0700  In: 3573.3 [P.O.:640; I.V.:2933.3]  Out: 5926 [Urine:5675; Drains:150]    Intake/Output Summary (Last 24 hours) at 17 1115  Last data filed at 17 1030   Gross per 24 hour   Intake          1446.25 ml   Output             4886 ml   Net         -3439.75 ml     Physical Exam:  General:  Alert, cooperative, no distress, weak. Head:  atraumatic. Eyes:  anicteric   Nose: Nares normal.        Neck: nontender       Lungs:   Bilateral crackles, no wheezes   Chest wall:  No tenderness   Heart:  Regular rate and rhythm   Abdomen:   Soft   Extremities: No edema.        Skin: dry       Neurologic: Grossly nonfocal       DATA:   Current Facility-Administered Medications   Medication Dose Route Frequency    potassium chloride SR (KLOR-CON 10) tablet 40 mEq  40 mEq Oral ONCE    hydrocortisone Sod Succ (PF) (SOLU-CORTEF) injection 50 mg  50 mg IntraVENous Q8H    heparin 25,000 units in D5W 250 ml infusion  16.5-36 Units/kg/hr IntraVENous TITRATE    albuterol-ipratropium (DUO-NEB) 2.5 MG-0.5 MG/3 ML  3 mL Nebulization QID RT    HYDROmorphone (PF) (DILAUDID) injection 0.5 mg  0.5 mg IntraVENous Q4H PRN    oxyCODONE IR (ROXICODONE) tablet 5 mg  5 mg Oral Q6H PRN    bumetanide (BUMEX) injection 1 mg  1 mg IntraVENous BID    pantoprazole (PROTONIX) 40 mg in sodium chloride 0.9 % 10 mL injection  40 mg IntraVENous Q12H    naloxone (NARCAN) injection 0.4 mg  0.4 mg IntraVENous PRN    acetaminophen (TYLENOL) tablet 650 mg  650 mg Oral Q4H PRN    diphenhydrAMINE (BENADRYL) injection 12.5 mg  12.5 mg IntraVENous Q4H PRN    ondansetron (ZOFRAN) injection 4 mg  4 mg IntraVENous Q4H PRN    piperacillin-tazobactam (ZOSYN) 3.375 g in 0.9% sodium chloride (MBP/ADV) 100 mL  3.375 g IntraVENous Q8H               Labs:  Recent Labs      03/11/17   0331  03/10/17   2140  03/10/17   1523  03/10/17   0350  03/09/17   0355   WBC  13.5*   --    --   14.7*  25.6*   HGB  9.0*  8.9*  8.9*  8.7*  10.0*   HCT  29.4*   --    --   30.0*  32.0*   PLT  329   --    --   341  363     Recent Labs      03/11/17   0331  03/10/17   0350  03/09/17   0355   NA  145  144  143   K  2.3*  3.6  3.2*   CL  110*  112*  111*   CO2  25  19*  16*   GLU  159*  139*  122*   BUN  35*  45*  50*   CREA  1.59*  1.72*  1.90*   CA  7.1*  7.3*  7.6*   MG  2.0  2.7*  2.8*   PHOS  2.9  3.1  4.1   ALB  1.9*  1.9*  1.9*   SGOT  25  42*  24   ALT  33  32  18   INR   --    --   1.3*     No results for input(s): PH, PCO2, PO2, HCO3, FIO2 in the last 72 hours. Imaging:  I have personally reviewed the patients radiographs and reports. Low volumes, ILD.        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 Call if questions.    GLOBAL ISSUES:   · Head of bed elevated  · GI Prophylaxis: PPI  · DVT Prophylaxis: heparin drip         My assessment/plan was discussed with: hospitalist        Brenda Mendiola MD

## 2017-03-11 NOTE — ROUTINE PROCESS
Primary Nurse Yetta Councilman, RN and Roxi Colmenares RN performed a dual skin assessment on this patient No impairment noted  Fermin score is 15

## 2017-03-11 NOTE — PROGRESS NOTES
SHIFT REPORT:  2055- Bedside shift change report given to Kevin Juarez RN (oncoming nurse) by Ary Israel RN (offgoing nurse). Report included the following information SBAR, Kardex, Procedure Summary, Intake/Output, Recent Results and Cardiac Rhythm. SHIFT SUMMARY:  2105-Arrived by bed to 336  2130-venous blood drawn for lab(Hgb and PTT)  2135-Dilaudid for op area pain  2250-PTT back at 71.8- no change in Heparin infusion; next PTT 0330  0330-venous blood drawn for AM lab; incont med loose brown stool  0410-Critical labs called to Dr. Pacheco Negrete, orders received. Heparin off at 0440 after getting prolonged .7; will recheck PTT 0645.  0510-Chest xray port done; pt taking sips of water without difficulty with KCL pills. Having difficulty using straw. 0630-PTT drawn  0650-PTT back at 56.6sec; Heparin restarted at 13.5unit/kg/hr; next PTT due at 1300 hr  Pt again cleaned for large amt liq tan/brown stool and flatus. .  END OF SHIFT REPORT:  0715-Bedside shift change report given to CHAZ Shrestha (oncoming nurse) by  Kevin Juarez RN (offgoing nurse). Report included the following information SBAR, Kardex, Procedure Summary, Intake/Output, Recent Results and Cardiac Rhythm .

## 2017-03-11 NOTE — PROGRESS NOTES
Bedside and Verbal shift change report given to [place] (oncoming nurse) by Pru (offgoing nurse). Report included the following information SBAR, Kardex, ED Summary, Procedure Summary, Intake/Output, MAR, Recent Results and Cardiac Rhythm nsr. Heparin drip dual verified, pt appears weak. Pt placed on turn team per protocol. 4048 pt temp 99.6 axillary, giving prn tylenol for temp, dilaudid prn for pain 5/10, will monitor    0917 Dr Agnes Jin notified of pt temp, weakness    0956 pt temp 99.2 ac, pt states pain has lessened \"maybe a 2\"    1001 Dr Agnes Jin notified that pt has q6 ptt for heparin drip, q6 hgb also but these are off sync. Telephone orders taken to wait until 1300 ptt to draw hgb lab so that pt is not stuck twice. 1010 Dr Agnes Jin at bedside    12 Son at bedside, given pt update    1117 order solucortef 25 mg q8. Dr Doreen Hickey at [facility] at bedside. 1120 order for norvasc 5 mg daily start 1200    1137 order for bumex 1 mg daily    1315 attempted x2 to draw lab without success    1415 blood drawn by Pérez Maier, sent to lab    1433 PTOT at bedside    1500 pt incont of stool, bathed, bed pad changed, repositioned, call bell within reach. 1520 telephone orders taken for 2 time hgb now & 0400 tomorrow    1530 PTT 55.1, per order increased by 1 unit/kg/hr to 14.5 unit/kg/hr totaling 13.1 mL/hr on pump, 13.2 mL/hr in MAR. Per pharmacy this is ok. Next PTT due 2130, PTT and HGB due 0330 & 0400    1633 pt HGB 9.1, foam ear protectors placed on cannula, O2 humidified    1857 Telephone orders taken to add BMP to am labs from Dr Agnes Jin    Bedside and Verbal shift change report given to Pru (oncoming nurse) by [place] (offgoing nurse). Report included the following information SBAR, Kardex, Intake/Output, MAR and Cardiac Rhythm nsr.     Night RN aware of PTT 2130 & PTT, H&H, BMP with am labs

## 2017-03-11 NOTE — PROGRESS NOTES
[facility] [address]  [address] [facility], [address] [place] [address]  [phone]      Medical Progress Note      NAME:         Santo Hubbard   :        1933  MRM:        [id number]    Date:          3/11/2017      Subjective: Patient has been seen and examined. Chart, labs, diagnostics reviewed. Generally feels very weak, low appetite but denies any abdominal discomfort, nausea or vomiting. No fever. Objective:    Vital Signs:    Visit Vitals    /60 (BP 1 Location: Left arm, BP Patient Position: At rest)    Pulse 83    Temp 99.2 °F (37.3 °C)    Resp 17    Ht 5' 4\" (1.626 m)    Wt 93 kg (205 lb)    SpO2 96%    Breastfeeding No    BMI 35.19 kg/m2        Intake/Output Summary (Last 24 hours) at 17 1110  Last data filed at 17 1030   Gross per 24 hour   Intake          1446.25 ml   Output             4886 ml   Net         -3439.75 ml        Physical Examination:    General:   Weak looking elderly female patient, not in much distress   Eyes:   pink conjunctivae, PERRLA with no discharge. ENT:   no ottorrhea or rhinorrhea with moist mucous membranes  Neck: no masses, thyroid non-tender and trachea central.  Pulm:  no accessory muscle use, clear breath sounds without crackles or wheezes  Card:  no JVD or murmurs, has regular and normal S1, S2 without thrills, bruits or peripheral edema  Abd:  Soft, mild discomfort, non-distended, normoactive bowel sounds with no palpable organomegaly  Musc:  No cyanosis, clubbing, atrophy or deformities. Skin:  No rashes, bruising or ulcers. Neuro: Awake and alert.  Generally a non focal exam. Follows commands appropriately  Psych:  Has a good insight and is oriented x 3    Current Facility-Administered Medications   Medication Dose Route Frequency    potassium chloride SR (KLOR-CON 10) tablet 40 mEq  40 mEq Oral ONCE    hydrocortisone Sod Succ (PF) (SOLU-CORTEF) injection 50 mg 50 mg IntraVENous Q8H    heparin 25,000 units in D5W 250 ml infusion  16.5-36 Units/kg/hr IntraVENous TITRATE    albuterol-ipratropium (DUO-NEB) 2.5 MG-0.5 MG/3 ML  3 mL Nebulization QID RT    HYDROmorphone (PF) (DILAUDID) injection 0.5 mg  0.5 mg IntraVENous Q4H PRN    oxyCODONE IR (ROXICODONE) tablet 5 mg  5 mg Oral Q6H PRN    bumetanide (BUMEX) injection 1 mg  1 mg IntraVENous BID    pantoprazole (PROTONIX) 40 mg in sodium chloride 0.9 % 10 mL injection  40 mg IntraVENous Q12H    naloxone (NARCAN) injection 0.4 mg  0.4 mg IntraVENous PRN    acetaminophen (TYLENOL) tablet 650 mg  650 mg Oral Q4H PRN    diphenhydrAMINE (BENADRYL) injection 12.5 mg  12.5 mg IntraVENous Q4H PRN    ondansetron (ZOFRAN) injection 4 mg  4 mg IntraVENous Q4H PRN    piperacillin-tazobactam (ZOSYN) 3.375 g in 0.9% sodium chloride (MBP/ADV) 100 mL  3.375 g IntraVENous Q8H        Laboratory data and review:    Recent Labs      03/11/17   0331  03/10/17   2140  03/10/17   1523  03/10/17   0350  03/09/17   0355   WBC  13.5*   --    --   14.7*  25.6*   HGB  9.0*  8.9*  8.9*  8.7*  10.0*   HCT  29.4*   --    --   30.0*  32.0*   PLT  329   --    --   341  363     Recent Labs      03/11/17   0331  03/10/17   0350  03/09/17   0355   NA  145  144  143   K  2.3*  3.6  3.2*   CL  110*  112*  111*   CO2  25  19*  16*   GLU  159*  139*  122*   BUN  35*  45*  50*   CREA  1.59*  1.72*  1.90*   CA  7.1*  7.3*  7.6*   MG  2.0  2.7*  2.8*   PHOS  2.9  3.1  4.1   ALB  1.9*  1.9*  1.9*   SGOT  25  42*  24   ALT  33  32  18   INR   --    --   1.3*     No components found for: [place]    Other Diagnostics:    Telemetry reviewed:   normal sinus rhythm    Assessment and Plan:    81 yo WF w/ hx of IPF, HTN, DVT, recent admission for bronchitis p/w abdominal pain, found to have acute cholecystitis     Acute cholecystitis / Leukocytosis / Nausea & vomiting / Diarrhea: C. Diff negative. S/p lap guillermina, found to have gangrenous GB. Improving well.  Continue IV pip/tazo. Appreciate Gen surgery follow up. Mobilize as tolerated    ARF (acute renal failure) / Hyponatremia / CKD (chronic kidney disease) stage 3/ Hypokalemia: Cr better today. Adequate urine output. Replete K+. Monitor on IVFs    Chronic respiratory failure with hypoxia / IPF (idiopathic pulmonary fibrosis): Noted on recent CT chest. Seen by Pulmonology. Continue supplemental O2 and duo-nebs PRN      Rheumatoid arthritis: On low dose prednisone. Methotrexate on hold. Taper stress dose steroids today.       Hx of deep venous thrombosis / Hx pulmonary embolism: On coumadin at home. Doppler extremity dopplers negative. Continue heparin gtt and monitor Hgb. Holding Coumadin for now until clear from surgery. Hypertension: BP stable. Resume Amlodipine    Weakness / Debilitated patient: severe. Continue PT/OT       Anemia, unspecified: with component of acute blood loss anemia: Hgb stable. Monitor       Esophageal reflux: stable.  Continue PPI     Total time spent for the patient's care: [facility] discussed with: Patient, Family, Nursing Staff and consulting physicians    Discussed:  Care Plan    Prophylaxis:  heparin gtt    Anticipated Disposition:   PT, OT, RN vs [facility]/Rehab           ___________________________________________________    Attending Physician:   Ramirez Harding MD

## 2017-03-11 NOTE — PROGRESS NOTES
SHIFT REPORT:  1900- Bedside shift change report given to Ananya Perez RN (oncoming nurse) by Deja Thornton (offgoing nurse). Report included the following information SBAR, Kardex, Procedure Summary, Intake/Output, Recent Results and Cardiac Rhythm. SHIFT SUMMARY:  1940-Pt c/o op area pain, Dilaudid given IV; Tylenol given PO for AX temp 99.0  2115-venous blood drawn for STAT PTT  2145-PTT back at 59.5 sec. No change in Heparin infusion rate of 16.5 units/kg/hr  2205-urine per Arroyo now noted to be bloody; Heparin infusion stopped and stat CBC drawn. 2245-Called Dr. Patt Lynne, informed of blood urine and recent CBC; will stop Heparin at this time and reevaluate when MD makes rounds in AM 3/12  0001-urine pink tinged at present  0400-venous blood drawn for AM labs. END OF SHIFT REPORT:  0700-Bedside shift change report given to CHAZ Shrestha (oncoming nurse) by Ananya Perez RN (offgoing nurse). Report included the following information SBAR, Kardex, Procedure Summary, Intake/Output, Recent Results and Cardiac Rhythm .

## 2017-03-12 LAB
ANION GAP BLD CALC-SCNC: 6 MMOL/L (ref 5–15)
APTT PPP: 28.2 SEC (ref 22.1–32.5)
BASOPHILS # BLD AUTO: 0 K/UL (ref 0–0.1)
BASOPHILS # BLD: 0 % (ref 0–1)
BUN SERPL-MCNC: 25 MG/DL (ref 6–20)
BUN/CREAT SERPL: 18 (ref 12–20)
CALCIUM SERPL-MCNC: 6.8 MG/DL (ref 8.5–10.1)
CHLORIDE SERPL-SCNC: 110 MMOL/L (ref 97–108)
CO2 SERPL-SCNC: 31 MMOL/L (ref 21–32)
CREAT SERPL-MCNC: 1.42 MG/DL (ref 0.55–1.02)
DIFFERENTIAL METHOD BLD: ABNORMAL
EOSINOPHIL # BLD: 0 K/UL (ref 0–0.4)
EOSINOPHIL NFR BLD: 0 % (ref 0–7)
ERYTHROCYTE [DISTWIDTH] IN BLOOD BY AUTOMATED COUNT: 21.1 % (ref 11.5–14.5)
GLUCOSE SERPL-MCNC: 130 MG/DL (ref 65–100)
HCT VFR BLD AUTO: 27.8 % (ref 35–47)
HGB BLD-MCNC: 8.4 G/DL (ref 11.5–16)
LYMPHOCYTES # BLD AUTO: 8 % (ref 12–49)
LYMPHOCYTES # BLD: 0.9 K/UL (ref 0.8–3.5)
MCH RBC QN AUTO: 23.9 PG (ref 26–34)
MCHC RBC AUTO-ENTMCNC: 30.2 G/DL (ref 30–36.5)
MCV RBC AUTO: 79 FL (ref 80–99)
MONOCYTES # BLD: 0.6 K/UL (ref 0–1)
MONOCYTES NFR BLD AUTO: 5 % (ref 5–13)
MYELOCYTES NFR BLD MANUAL: 3 %
NEUTS BAND NFR BLD MANUAL: 1 % (ref 0–6)
NEUTS SEG # BLD: 9.7 K/UL (ref 1.8–8)
NEUTS SEG NFR BLD AUTO: 83 % (ref 32–75)
PLATELET # BLD AUTO: 324 K/UL (ref 150–400)
POTASSIUM SERPL-SCNC: 2.3 MMOL/L (ref 3.5–5.1)
RBC # BLD AUTO: 3.52 M/UL (ref 3.8–5.2)
RBC MORPH BLD: ABNORMAL
SODIUM SERPL-SCNC: 147 MMOL/L (ref 136–145)
THERAPEUTIC RANGE,PTTT: NORMAL SECS (ref 58–77)
WBC # BLD AUTO: 11.6 K/UL (ref 3.6–11)

## 2017-03-12 PROCEDURE — 85025 COMPLETE CBC W/AUTO DIFF WBC: CPT | Performed by: INTERNAL MEDICINE

## 2017-03-12 PROCEDURE — 85730 THROMBOPLASTIN TIME PARTIAL: CPT | Performed by: INTERNAL MEDICINE

## 2017-03-12 PROCEDURE — 80048 BASIC METABOLIC PNL TOTAL CA: CPT | Performed by: INTERNAL MEDICINE

## 2017-03-12 PROCEDURE — 74011250636 HC RX REV CODE- 250/636: Performed by: INTERNAL MEDICINE

## 2017-03-12 PROCEDURE — 74011000258 HC RX REV CODE- 258: Performed by: INTERNAL MEDICINE

## 2017-03-12 PROCEDURE — 74011250637 HC RX REV CODE- 250/637: Performed by: INTERNAL MEDICINE

## 2017-03-12 PROCEDURE — 36415 COLL VENOUS BLD VENIPUNCTURE: CPT | Performed by: INTERNAL MEDICINE

## 2017-03-12 PROCEDURE — 77010033678 HC OXYGEN DAILY

## 2017-03-12 PROCEDURE — 65660000000 HC RM CCU STEPDOWN

## 2017-03-12 RX ORDER — BUMETANIDE 1 MG/1
0.5 TABLET ORAL DAILY
Status: DISCONTINUED | OUTPATIENT
Start: 2017-03-12 | End: 2017-03-15 | Stop reason: HOSPADM

## 2017-03-12 RX ORDER — POTASSIUM CHLORIDE 1.5 G/1.77G
40 POWDER, FOR SOLUTION ORAL EVERY 4 HOURS
Status: COMPLETED | OUTPATIENT
Start: 2017-03-12 | End: 2017-03-12

## 2017-03-12 RX ORDER — AMLODIPINE BESYLATE 5 MG/1
10 TABLET ORAL DAILY
Status: DISCONTINUED | OUTPATIENT
Start: 2017-03-12 | End: 2017-03-15 | Stop reason: HOSPADM

## 2017-03-12 RX ORDER — DEXTROSE MONOHYDRATE AND SODIUM CHLORIDE 5; .45 G/100ML; G/100ML
75 INJECTION, SOLUTION INTRAVENOUS CONTINUOUS
Status: DISCONTINUED | OUTPATIENT
Start: 2017-03-12 | End: 2017-03-12

## 2017-03-12 RX ORDER — POTASSIUM CHLORIDE 750 MG/1
40 TABLET, FILM COATED, EXTENDED RELEASE ORAL EVERY 4 HOURS
Status: DISCONTINUED | OUTPATIENT
Start: 2017-03-12 | End: 2017-03-12

## 2017-03-12 RX ORDER — IPRATROPIUM BROMIDE AND ALBUTEROL SULFATE 2.5; .5 MG/3ML; MG/3ML
3 SOLUTION RESPIRATORY (INHALATION)
Status: DISCONTINUED | OUTPATIENT
Start: 2017-03-12 | End: 2017-03-15 | Stop reason: HOSPADM

## 2017-03-12 RX ORDER — PANTOPRAZOLE SODIUM 40 MG/1
40 TABLET, DELAYED RELEASE ORAL
Status: DISCONTINUED | OUTPATIENT
Start: 2017-03-12 | End: 2017-03-15 | Stop reason: HOSPADM

## 2017-03-12 RX ADMIN — HYDROCORTISONE SODIUM SUCCINATE 25 MG: 100 INJECTION, POWDER, FOR SOLUTION INTRAMUSCULAR; INTRAVENOUS at 06:12

## 2017-03-12 RX ADMIN — BUMETANIDE 0.5 MG: 1 TABLET ORAL at 08:17

## 2017-03-12 RX ADMIN — PIPERACILLIN SODIUM,TAZOBACTAM SODIUM 3.38 G: 3; .375 INJECTION, POWDER, FOR SOLUTION INTRAVENOUS at 08:12

## 2017-03-12 RX ADMIN — ACETAMINOPHEN 650 MG: 325 TABLET ORAL at 19:56

## 2017-03-12 RX ADMIN — POTASSIUM CHLORIDE 40 MEQ: 1.5 POWDER, FOR SOLUTION ORAL at 19:57

## 2017-03-12 RX ADMIN — POTASSIUM CHLORIDE 40 MEQ: 1.5 POWDER, FOR SOLUTION ORAL at 12:00

## 2017-03-12 RX ADMIN — PANTOPRAZOLE SODIUM 40 MG: 40 TABLET, DELAYED RELEASE ORAL at 08:17

## 2017-03-12 RX ADMIN — AMLODIPINE BESYLATE 10 MG: 5 TABLET ORAL at 08:21

## 2017-03-12 RX ADMIN — POTASSIUM CHLORIDE 40 MEQ: 750 TABLET, FILM COATED, EXTENDED RELEASE ORAL at 08:22

## 2017-03-12 RX ADMIN — PIPERACILLIN SODIUM,TAZOBACTAM SODIUM 3.38 G: 3; .375 INJECTION, POWDER, FOR SOLUTION INTRAVENOUS at 00:11

## 2017-03-12 RX ADMIN — HYDROCORTISONE SODIUM SUCCINATE 25 MG: 100 INJECTION, POWDER, FOR SOLUTION INTRAMUSCULAR; INTRAVENOUS at 16:04

## 2017-03-12 RX ADMIN — POTASSIUM CHLORIDE 40 MEQ: 1.5 POWDER, FOR SOLUTION ORAL at 16:06

## 2017-03-12 RX ADMIN — PIPERACILLIN SODIUM,TAZOBACTAM SODIUM 3.38 G: 3; .375 INJECTION, POWDER, FOR SOLUTION INTRAVENOUS at 16:20

## 2017-03-12 NOTE — PROGRESS NOTES
SHIFT REPORT:  1900- Bedside shift change report given to Carrie Murcia RN (oncoming nurse) by Neymar Rasmussen RN (offgoing nurse). Report included the following information SBAR, Kardex, Procedure Summary, Intake/Output, Recent Results and Cardiac Rhythm. SHIFT SUMMARY:  2000-accepted po fluid well including Klor-con in apple juice; Tylenol given for mild discomfort. 0105-pt asked for IV pain med, Dilaudid given IV, accepting clear liq , w no swallowing diff. 0315-states she has not slept, given PO pain med, venous blood drawn for AM lab; cleansed for mod lg liq tan stool containing 6 Klor-con pills from AM 3/11  0330-Pt states that she feels as if her \"implanted teeth\" have had some damage done at the time of surgery; she stated she had felt her teeth no longer are aligned as prior to surgery and had felt it was due to swelling in her mouth, but now she thinks some \"damage\" had been done to her teeth; she is concerned as to whom she could speak to about it. RN told pt that RN would relay info to \"day nurse\" and the unit director would be available  in the AM 3/13. END OF SHIFT REPORT:  0715-Bedside shift change report given to CHAZ NEVAREZ (oncoming nurse) by Carrie Murcia RN (offgoing nurse). Report included the following information SBAR, Kardex, Procedure Summary, Intake/Output, Recent Results and Cardiac Rhythm .

## 2017-03-12 NOTE — PROGRESS NOTES
Bedside and Verbal shift change report given to [facility] (oncoming nurse) by Pru (offgoing nurse). Report included the following information SBAR, Kardex, Intake/Output, MAR, Recent Results and Cardiac Rhythm nsr.    9972 Dr Barnes Hence notified of pt bloody urine overnight, temp before bedtime, heparin drip stopped, hgb dropping, K 2.3. He will round on pt soon. Orders noted for kclor 40 mEq q4, BMP with am labs     0720 Dr Barnes Hence at bedside    0732 order noted to decrease bumex to 0.5 mg once daily, protonix amended to po, solucortef amended to 25 mg q8, norvasc 10 mg daily    0805 pt refusing to be assisted to chair \"Maybe later\", pt denies pain, bed placed in chair position    0928 order noted to change nebs to q4 prn. Pt incont of stool, cleaned. Whole pills noted in stool, the size of kclor tabs. Dr Barnes Hence notified via [facility] Text. 36 Dr Sampson Molina at bedside, given pt update. Per MD keep VELMA drains for today. 1127 order noted to dc kclor tabs, replace with kclor powder 40 mEq q4 starting 1200      1200 Son at bedside, given pt update    1530 PT at bedside, pt refusing to get out of bed with therapy. Stressed the importance of staying out of bed and ambulation \"I just don't feel like it\"    [address] Telephone order taken from Dr Barnes Hence to add CBC on to am labs. Bedside and Verbal shift change report given to Pru (oncoming nurse) by [facility] (offgoing nurse). Report included the following information SBAR, Kardex, Intake/Output, MAR, Med Rec Status and Cardiac Rhythm nsr.

## 2017-03-12 NOTE — PROGRESS NOTES
Garcia Luis Enrique [facility] 79  380 [facility], [address] [place] [address]  [phone]      Medical Progress Note      NAME:         Freddy Wright   :        1933  MRM:        [id number]    Date:          3/12/2017      Subjective: Patient has been seen and examined. Chart, labs, diagnostics reviewed. She reportedly had maxime hematuria last night needing discontinuation of heparin. Urine has cleared since then. She however remains very weak. Denies any chest discomfort, SOB. Had some loose stools. Appetite low. Discussed with her nurse for collaborative hx. Objective:    Vital Signs:    Visit Vitals    /56 (BP 1 Location: Left arm, BP Patient Position: At rest)    Pulse 95    Temp 98.6 °F (37 °C)    Resp 17    Ht 5' 4\" (1.626 m)    Wt 88.5 kg (195 lb)    SpO2 97%    Breastfeeding No    BMI 33.47 kg/m2          Intake/Output Summary (Last 24 hours) at 17 0719  Last data filed at 17 0615   Gross per 24 hour   Intake             1080 ml   Output             2625 ml   Net            -1545 ml        Physical Examination:    General:   Weak looking elderly female patient, uncomfortable but not in much distress    Eyes:   pink conjunctivae, PERRLA with no discharge. ENT:   no ottorrhea or rhinorrhea with dry mucous membranes  Neck: no masses, thyroid non-tender and trachea central.  Pulm: generally decreased clear breath sounds without crackles or wheezes  Card:  no JVD or murmurs, has regular and normal S1, S2 without thrills, bruits or peripheral edema  Abd:  Soft, mild discomfort, non-distended, normoactive bowel sounds with no palpable organomegaly  Musc:  No cyanosis, clubbing, atrophy or deformities. Skin:  No rashes, bruising or ulcers. Neuro: Awake and alert.  Generally a non focal exam. Follows commands appropriately  Psych:  Has a fair insight and is oriented x 3    Current Facility-Administered Medications Medication Dose Route Frequency    potassium chloride SR (KLOR-CON 10) tablet 40 mEq  40 mEq Oral Q4H    hydrocortisone Sod Succ (PF) (SOLU-CORTEF) injection 25 mg  25 mg IntraVENous Q8H    amLODIPine (NORVASC) tablet 5 mg  5 mg Oral DAILY    bumetanide (BUMEX) injection 1 mg  1 mg IntraVENous DAILY    albuterol-ipratropium (DUO-NEB) 2.5 MG-0.5 MG/3 ML  3 mL Nebulization QID RT    HYDROmorphone (PF) (DILAUDID) injection 0.5 mg  0.5 mg IntraVENous Q4H PRN    oxyCODONE IR (ROXICODONE) tablet 5 mg  5 mg Oral Q6H PRN    pantoprazole (PROTONIX) 40 mg in sodium chloride 0.9 % 10 mL injection  40 mg IntraVENous Q12H    naloxone (NARCAN) injection 0.4 mg  0.4 mg IntraVENous PRN    acetaminophen (TYLENOL) tablet 650 mg  650 mg Oral Q4H PRN    diphenhydrAMINE (BENADRYL) injection 12.5 mg  12.5 mg IntraVENous Q4H PRN    ondansetron (ZOFRAN) injection 4 mg  4 mg IntraVENous Q4H PRN    piperacillin-tazobactam (ZOSYN) 3.375 g in 0.9% sodium chloride (MBP/ADV) 100 mL  3.375 g IntraVENous Q8H        Laboratory data and review:    Recent Labs      03/12/17   0355  03/11/17   2221  03/11/17   1530  03/11/17   0331   WBC  11.6*  14.6*   --   13.5*   HGB  8.4*  8.8*  9.1*  9.0*   HCT  27.8*  28.2*  29.4*  29.4*   PLT  324  336   --   329     Recent Labs      03/12/17   0355  03/11/17   0331  03/10/17   0350   NA  147*  145  144   K  2.3*  2.3*  3.6   CL  110*  110*  112*   CO2  31  25  19*   GLU  130*  159*  139*   BUN  25*  35*  45*   CREA  1.42*  1.59*  1.72*   CA  6.8*  7.1*  7.3*   MG   --   2.0  2.7*   PHOS   --   2.9  3.1   ALB   --   1.9*  1.9*   SGOT   --   25  42*   ALT   --   33  32     No components found for: [place]    Other Diagnostics:    Telemetry reviewed:   normal sinus rhythm    Assessment and Plan:    81 yo WF w/ hx of IPF, HTN, DVT, recent admission for bronchitis p/w abdominal pain, found to have acute cholecystitis     Acute cholecystitis / Leukocytosis / Nausea & vomiting / Diarrhea: Still has some episodes of diarrhea. C. Diff negative. S/p lap guillermina, found to have gangrenous GB. Remains weak but stable. Continue IV Zosyn. PT, OT as tolerated. Gen surgery following. ARF (acute renal failure) / Hyponatremia / CKD (chronic kidney disease) stage 3/ Hypokalemia: Cr better. Adequate urine output. Replete K+ some more. Decrease bumex dosage. Chronic respiratory failure with hypoxia / IPF (idiopathic pulmonary fibrosis): Noted on recent CT chest. Seen by Pulmonology. Continue supplemental O2 and duo-nebs PRN, steroids      Rheumatoid arthritis: DC stress hydrocortisone today afternoon. Continue low dose prednisone. Methotrexate on hold.        Hx of deep venous thrombosis / Hx pulmonary embolism: On coumadin at home. Doppler extremity dopplers negative. With hematuria, heparin discontinued. Monitor Hgb and resume anticoagulation if no recurrence of bleeding      Hypertension: BP stable. Continue adjusted Amlodipine    Weakness / Debilitated patient: severe. Continue PT/OT as tolerated      Anemia, unspecified: with component of acute blood loss anemia: Hgb relatively stable. stable. Monitor       Esophageal reflux: stable.  Continue PPI     Total time spent for the patient's care: [address] discussed with: Patient, Family, Nursing Staff and consulting physicians    Discussed:  Care Plan    Prophylaxis:  heparin gtt (on hold due to hematuria)    Anticipated Disposition:   PT, OT, RN vs SAH/Rehab           ___________________________________________________    Attending Physician:   Munira Coleman MD

## 2017-03-12 NOTE — PROGRESS NOTES
03/12/17 0849   Chart and Patient  Assessment   Pulmonary History 0   Surgical History 0   Chest X-ray 2   Respiratory Pattern 0   Mental Status 0   Breath Sounds 2   Cough 0   Level of Activity/Mobility 1   Respiratory Assessment Total Score 5   Date Last Assessed 03/12/17     Neb frequency changed to PRN per RT Protocol and per Dr. Corky Homans recent note stating DuoNebs PRN. Also, patient has been refusing nebs. She states her breathing has been normal for her--only complaint is soreness in her mouth that RN and MD are already aware of.

## 2017-03-12 NOTE — PROGRESS NOTES
Physical Therapy  Order received and acknowledged. Spoke with nursing prior to attempting to see the patient for PT evaluation/treatment this day. Son at bedside. Patient refusing to get up despite multiple attempts from therapist and son. Patient stating \"I am just too tired and I just want to rest. I had a horrible night and I want to rest today\". PT explained the benefits of getting up and the detrimental affects of bed rest.  PT encouraging patient to weight shift and patient did agree to change her position in the bed. PT assisted her mod/max A for rolling R. She was propped with 2 pillows in semi R side lying. Discussed floating heels and 1-2 hour position changes to decrease risk for pressure sores.       Per son patient was more alert this am and therapy may have better chance getting patient out of bed in the am    Stephanie Harrison, PT, DPT, CLT

## 2017-03-13 LAB
ANION GAP BLD CALC-SCNC: 7 MMOL/L (ref 5–15)
APTT PPP: 25.9 SEC (ref 22.1–32.5)
BACTERIA SPEC CULT: NORMAL
BASOPHILS # BLD AUTO: 0 K/UL (ref 0–0.1)
BASOPHILS # BLD: 0 % (ref 0–1)
BUN SERPL-MCNC: 17 MG/DL (ref 6–20)
BUN/CREAT SERPL: 13 (ref 12–20)
CALCIUM SERPL-MCNC: 6.9 MG/DL (ref 8.5–10.1)
CHLORIDE SERPL-SCNC: 108 MMOL/L (ref 97–108)
CO2 SERPL-SCNC: 29 MMOL/L (ref 21–32)
CREAT SERPL-MCNC: 1.26 MG/DL (ref 0.55–1.02)
DIFFERENTIAL METHOD BLD: ABNORMAL
EOSINOPHIL # BLD: 0 K/UL (ref 0–0.4)
EOSINOPHIL NFR BLD: 0 % (ref 0–7)
ERYTHROCYTE [DISTWIDTH] IN BLOOD BY AUTOMATED COUNT: 20.9 % (ref 11.5–14.5)
GLUCOSE SERPL-MCNC: 114 MG/DL (ref 65–100)
GRAM STN SPEC: NORMAL
GRAM STN SPEC: NORMAL
HCT VFR BLD AUTO: 29.6 % (ref 35–47)
HGB BLD-MCNC: 8.6 G/DL (ref 11.5–16)
INR PPP: 1.4 (ref 0.9–1.1)
LYMPHOCYTES # BLD AUTO: 11 % (ref 12–49)
LYMPHOCYTES # BLD: 1.3 K/UL (ref 0.8–3.5)
MCH RBC QN AUTO: 23.5 PG (ref 26–34)
MCHC RBC AUTO-ENTMCNC: 29.1 G/DL (ref 30–36.5)
MCV RBC AUTO: 80.9 FL (ref 80–99)
METAMYELOCYTES NFR BLD MANUAL: 1 %
MONOCYTES # BLD: 0.7 K/UL (ref 0–1)
MONOCYTES NFR BLD AUTO: 6 % (ref 5–13)
NEUTS BAND NFR BLD MANUAL: 3 % (ref 0–6)
NEUTS SEG # BLD: 9.3 K/UL (ref 1.8–8)
NEUTS SEG NFR BLD AUTO: 79 % (ref 32–75)
PLATELET # BLD AUTO: 300 K/UL (ref 150–400)
POTASSIUM SERPL-SCNC: 3.2 MMOL/L (ref 3.5–5.1)
PROTHROMBIN TIME: 13.8 SEC (ref 9–11.1)
RBC # BLD AUTO: 3.66 M/UL (ref 3.8–5.2)
RBC MORPH BLD: ABNORMAL
SERVICE CMNT-IMP: NORMAL
SERVICE CMNT-IMP: NORMAL
SODIUM SERPL-SCNC: 144 MMOL/L (ref 136–145)
THERAPEUTIC RANGE,PTTT: NORMAL SECS (ref 58–77)
WBC # BLD AUTO: 11.4 K/UL (ref 3.6–11)

## 2017-03-13 PROCEDURE — 85730 THROMBOPLASTIN TIME PARTIAL: CPT | Performed by: INTERNAL MEDICINE

## 2017-03-13 PROCEDURE — 74011636637 HC RX REV CODE- 636/637: Performed by: INTERNAL MEDICINE

## 2017-03-13 PROCEDURE — 74011250637 HC RX REV CODE- 250/637: Performed by: INTERNAL MEDICINE

## 2017-03-13 PROCEDURE — 97161 PT EVAL LOW COMPLEX 20 MIN: CPT

## 2017-03-13 PROCEDURE — 74011250636 HC RX REV CODE- 250/636: Performed by: INTERNAL MEDICINE

## 2017-03-13 PROCEDURE — 85025 COMPLETE CBC W/AUTO DIFF WBC: CPT | Performed by: INTERNAL MEDICINE

## 2017-03-13 PROCEDURE — 97530 THERAPEUTIC ACTIVITIES: CPT

## 2017-03-13 PROCEDURE — 85610 PROTHROMBIN TIME: CPT | Performed by: INTERNAL MEDICINE

## 2017-03-13 PROCEDURE — 80048 BASIC METABOLIC PNL TOTAL CA: CPT | Performed by: INTERNAL MEDICINE

## 2017-03-13 PROCEDURE — 36415 COLL VENOUS BLD VENIPUNCTURE: CPT | Performed by: INTERNAL MEDICINE

## 2017-03-13 PROCEDURE — 77010033678 HC OXYGEN DAILY

## 2017-03-13 PROCEDURE — 65270000029 HC RM PRIVATE

## 2017-03-13 PROCEDURE — 74011000258 HC RX REV CODE- 258: Performed by: INTERNAL MEDICINE

## 2017-03-13 PROCEDURE — 74011250637 HC RX REV CODE- 250/637: Performed by: PHYSICIAN ASSISTANT

## 2017-03-13 RX ORDER — PREDNISONE 5 MG/1
5 TABLET ORAL DAILY
Status: DISCONTINUED | OUTPATIENT
Start: 2017-03-13 | End: 2017-03-15 | Stop reason: HOSPADM

## 2017-03-13 RX ORDER — WARFARIN SODIUM 5 MG/1
5 TABLET ORAL ONCE
Status: COMPLETED | OUTPATIENT
Start: 2017-03-13 | End: 2017-03-13

## 2017-03-13 RX ORDER — POTASSIUM CHLORIDE 1.5 G/1.77G
40 POWDER, FOR SOLUTION ORAL EVERY 4 HOURS
Status: COMPLETED | OUTPATIENT
Start: 2017-03-13 | End: 2017-03-13

## 2017-03-13 RX ADMIN — OXYCODONE HYDROCHLORIDE 5 MG: 5 TABLET ORAL at 03:30

## 2017-03-13 RX ADMIN — PIPERACILLIN SODIUM,TAZOBACTAM SODIUM 3.38 G: 3; .375 INJECTION, POWDER, FOR SOLUTION INTRAVENOUS at 01:01

## 2017-03-13 RX ADMIN — POTASSIUM CHLORIDE 40 MEQ: 1.5 POWDER, FOR SOLUTION ORAL at 13:04

## 2017-03-13 RX ADMIN — PIPERACILLIN SODIUM,TAZOBACTAM SODIUM 3.38 G: 3; .375 INJECTION, POWDER, FOR SOLUTION INTRAVENOUS at 16:45

## 2017-03-13 RX ADMIN — POTASSIUM CHLORIDE 40 MEQ: 1.5 POWDER, FOR SOLUTION ORAL at 09:29

## 2017-03-13 RX ADMIN — WARFARIN SODIUM 5 MG: 5 TABLET ORAL at 17:51

## 2017-03-13 RX ADMIN — PREDNISONE 5 MG: 5 TABLET ORAL at 13:04

## 2017-03-13 RX ADMIN — PIPERACILLIN SODIUM,TAZOBACTAM SODIUM 3.38 G: 3; .375 INJECTION, POWDER, FOR SOLUTION INTRAVENOUS at 09:33

## 2017-03-13 RX ADMIN — BUMETANIDE 0.5 MG: 1 TABLET ORAL at 09:32

## 2017-03-13 RX ADMIN — POTASSIUM CHLORIDE 40 MEQ: 1.5 POWDER, FOR SOLUTION ORAL at 14:33

## 2017-03-13 RX ADMIN — HYDROMORPHONE HYDROCHLORIDE 0.5 MG: 1 INJECTION, SOLUTION INTRAMUSCULAR; INTRAVENOUS; SUBCUTANEOUS at 01:02

## 2017-03-13 RX ADMIN — AMLODIPINE BESYLATE 10 MG: 5 TABLET ORAL at 09:32

## 2017-03-13 RX ADMIN — PANTOPRAZOLE SODIUM 40 MG: 40 TABLET, DELAYED RELEASE ORAL at 09:32

## 2017-03-13 NOTE — PROGRESS NOTES
Garcia Dudley [facility] 79  380 [facility], [address] [place] [address]  [phone]      Medical Progress Note      NAME:         Baldo Gregorio   :        1933  MRM:        [id number]    Date:          3/13/2017      Subjective: Patient has been seen and examined. Chart, labs, diagnostics reviewed. Ms Cindy Alamo remains generally feeble. However, she denies any abdominal discomfort, nausea, vomiting or fever. No more diarrhea. Objective:    Vital Signs:    Visit Vitals    /87 (BP 1 Location: Left arm, BP Patient Position: At rest)    Pulse 61    Temp 97.1 °F (36.2 °C)    Resp 13    Ht 5' 4\" (1.626 m)    Wt 88.5 kg (195 lb)    SpO2 98%    Breastfeeding No    BMI 33.47 kg/m2          Intake/Output Summary (Last 24 hours) at 17 0957  Last data filed at 17 1303   Gross per 24 hour   Intake             1090 ml   Output             1370 ml   Net             -280 ml        Physical Examination:    General:   Weak looking elderly female patient, not in any distress today. Eyes:   pink conjunctivae, PERRLA with no discharge. ENT:   no ottorrhea or rhinorrhea with dry mucous membranes  Neck: no masses, thyroid non-tender and trachea central.  Pulm: generally decreased clear breath sounds without crackles or wheezes  Card:  no JVD or murmurs, has regular and normal S1, S2 without thrills, bruits or peripheral edema  Abd:  Soft, mild discomfort, non-distended, normoactive bowel sounds with no palpable organomegaly  Musc:  No cyanosis, clubbing, atrophy or deformities. Skin:  No rashes, bruising or ulcers. Neuro: Awake and alert.  Generally a non focal exam.   Psych:  Has a fair insight and is oriented x 3    Current Facility-Administered Medications   Medication Dose Route Frequency    potassium chloride (KLOR-CON) packet 40 mEq  40 mEq Oral Q4H    pantoprazole (PROTONIX) tablet 40 mg  40 mg Oral ACB    amLODIPine (NORVASC) tablet 10 mg  10 mg Oral DAILY    bumetanide (BUMEX) tablet 0.5 mg  0.5 mg Oral DAILY    albuterol-ipratropium (DUO-NEB) 2.5 MG-0.5 MG/3 ML  3 mL Nebulization Q4H PRN    HYDROmorphone (PF) (DILAUDID) injection 0.5 mg  0.5 mg IntraVENous Q4H PRN    oxyCODONE IR (ROXICODONE) tablet 5 mg  5 mg Oral Q6H PRN    naloxone (NARCAN) injection 0.4 mg  0.4 mg IntraVENous PRN    acetaminophen (TYLENOL) tablet 650 mg  650 mg Oral Q4H PRN    diphenhydrAMINE (BENADRYL) injection 12.5 mg  12.5 mg IntraVENous Q4H PRN    ondansetron (ZOFRAN) injection 4 mg  4 mg IntraVENous Q4H PRN    piperacillin-tazobactam (ZOSYN) 3.375 g in 0.9% sodium chloride (MBP/ADV) 100 mL  3.375 g IntraVENous Q8H        Laboratory data and review:    Recent Labs      03/13/17   0326  03/12/17   0355  03/11/17   2221   WBC  11.4*  11.6*  14.6*   HGB  8.6*  8.4*  8.8*   HCT  29.6*  27.8*  28.2*   PLT  300  324  336     Recent Labs      03/13/17   0326  03/12/17   0355  03/11/17   0331   NA  144  147*  145   K  3.2*  2.3*  2.3*   CL  108  110*  110*   CO2  29  31  25   GLU  114*  130*  159*   BUN  17  25*  35*   CREA  1.26*  1.42*  1.59*   CA  6.9*  6.8*  7.1*   MG   --    --   2.0   PHOS   --    --   2.9   ALB   --    --   1.9*   SGOT   --    --   25   ALT   --    --   33     No components found for: [place]    Other Diagnostics:    Telemetry reviewed:   normal sinus rhythm    Assessment and Plan:    81 yo WF w/ hx of IPF, HTN, DVT, recent admission for bronchitis p/w abdominal pain, found to have acute cholecystitis     Acute cholecystitis with sepsis / Diarrhea POA: Clinically much better today. C. Diff negative. S/p lap guillermina, found to have gangrenous GB. Continue IV Zosyn while here. PT, OT as tolerated. Will need rehab. Gen surgery following. ARF (acute renal failure) / Hyponatremia / CKD (chronic kidney disease) stage 3/ Hypokalemia: Cr better and stable. Adequate urine output. Replete K+ some more. Continue bumex.  ALEXANDRE mcallister catheter    Chronic respiratory failure with hypoxia / IPF (idiopathic pulmonary fibrosis): Noted on recent CT chest. Seen by Pulmonology. Continue supplemental O2 and duo-nebs PRN      Rheumatoid arthritis: DC stress hydrocortisone today afternoon. Continue low dose prednisone. Methotrexate on hold.        Hx of deep venous thrombosis / Hx pulmonary embolism: On coumadin at home. Doppler extremity dopplers negative. With hematuria, heparin discontinued. Hgb stable. Discussed with surgery. Resume Coumadin. Hypertension: BP stable. Continue Amlodipine    Weakness / Debilitated patient: severe. Continue PT/OT as tolerated. Will need rehab.       Anemia, unspecified: with component of acute blood loss anemia: Hgb relatively stable. stable. Monitor       Esophageal reflux: stable.  Continue PPI     Total time spent for the patient's care: [address] discussed with: Patient, Family, Nursing Staff and consulting physicians    Discussed:  Care Plan    Prophylaxis:  Coumadin    Anticipated Disposition:  SAH/Rehab           ___________________________________________________    Attending Physician:   Danna Chan MD

## 2017-03-13 NOTE — ROUTINE PROCESS
Bedside and Verbal shift change report given to Gilbert Mcclellan RN (oncoming nurse) by Neil Mcgill RN (offgoing nurse). Report included the following information SBAR, Kardex, Intake/Output, MAR and Recent Results.

## 2017-03-13 NOTE — PROGRESS NOTES
[facility] Dosing Services: 3/13/2017    Consult for Warfarin Dosing by Pharmacy by Dr. Chad Parks provided for this 80 y.o.  female , for indication of DVT/PE history . Resumed. Home dose reported as 5 mg on Monday and Thursday, 2.5 mg rest of week  Dose to achieve an INR goal of 2-3    Order entered for  Warfarin 5 (mg) ordered to be given today at 18:00. Significant drug interactions: Vitamin K 10 mg IVPB 3/8 (prior surgery)  Previous dose given 5 mg on 3/7/17   PT/INR Lab Results   Component Value Date/Time    INR 1.4 03/13/2017 12:32 PM      Platelets Lab Results   Component Value Date/Time    PLATELET 410 89/83/2213 03:26 AM      H/H Lab Results   Component Value Date/Time    HGB 8.6 03/13/2017 03:26 AM        Pharmacy to follow daily and will provide subsequent Warfarin dosing based on clinical status.   Evelyn Ayala, [facility] [place])  Contact information [phone]

## 2017-03-13 NOTE — PROGRESS NOTES
Pt has labs orders PTT q6hrs and Hgb q12hrs. Called, spoke with Dr. Ting Elena to confirm that these orders are active with pt's transfer.  New orders to be received to discontinue the above labs, no longer active per MD.

## 2017-03-13 NOTE — CDMP QUERY
=>Sepsis POA secondary to gangrenous gallbladder AEB leucocytosis, elevate neutrophils and tachypnea requiring IV zosyn and lap ccx   =>Other Explanation of clinical findings  =>Unable to Determine (no explanation of clinical findings)    The medical record reflects the following clinical findings, treatment, and risk factors:  Risk Factors: 79 yo  F admitted on 3/7 with acute cholecystitis and underwent lap ccx on 3/9   Clinical Indicators: on admit: WBC 40.4 Neuts 92% RR 35   gangrenous gallbladder   op report 3/9 states \" The patient was already being treated with IV broad-spectrum antibiotics with sepsis. \"  3/10 intensivist note \" Metabolic Acidosis: secondary to sepsis from cholecystitis: improved with LR\"   Treatment: IV zosyn 3.375 mg q 8 h     Please clarify and document your clinical opinion in the progress notes and discharge summary including the definitive and/or presumptive diagnosis, (suspected or probable), related to the above clinical findings. Please include clinical findings supporting your diagnosis.     Thank you for your time   [facility] RN/BSN, [address]  Desk:   [phone]   Other:  [phone]

## 2017-03-13 NOTE — PROGRESS NOTES
3041 Bedside and Verbal shift change report given to MADELEINE Orta RN (oncoming nurse) by Clayton Trejo RN (offgoing nurse). Report included the following information SBAR, Kardex, Intake/Output and MAR. 1045 removed mcallister catheter. Pt has output of 450 ml of clear yellow urine. Pt incontinent of stool after removal of mcallister. Incontinence care provided.

## 2017-03-13 NOTE — PROGRESS NOTES
[address] Yossi Coburn  YOB: 1933          Assessment & Plan:   ARF due to IVVD   · Cr appears to be at baseline  · Continue supportive care    CKD 3   · Not previously followed by us    HTN  · Reasonably controlled with amlodipine    Hypokalemia  · Replete       Subjective:   CC: f/u ARF  HPI: Renal function back to baseline. K still low. ROS: no n/v/sob +diarrhea  Current Facility-Administered Medications   Medication Dose Route Frequency    potassium chloride (KLOR-CON) packet 40 mEq  40 mEq Oral Q4H    predniSONE (DELTASONE) tablet 5 mg  5 mg Oral DAILY    warfarin - pharmacist to dose   Other Rx Dosing/Monitoring    pantoprazole (PROTONIX) tablet 40 mg  40 mg Oral ACB    amLODIPine (NORVASC) tablet 10 mg  10 mg Oral DAILY    bumetanide (BUMEX) tablet 0.5 mg  0.5 mg Oral DAILY    albuterol-ipratropium (DUO-NEB) 2.5 MG-0.5 MG/3 ML  3 mL Nebulization Q4H PRN    HYDROmorphone (PF) (DILAUDID) injection 0.5 mg  0.5 mg IntraVENous Q4H PRN    oxyCODONE IR (ROXICODONE) tablet 5 mg  5 mg Oral Q6H PRN    naloxone (NARCAN) injection 0.4 mg  0.4 mg IntraVENous PRN    acetaminophen (TYLENOL) tablet 650 mg  650 mg Oral Q4H PRN    diphenhydrAMINE (BENADRYL) injection 12.5 mg  12.5 mg IntraVENous Q4H PRN    ondansetron (ZOFRAN) injection 4 mg  4 mg IntraVENous Q4H PRN    piperacillin-tazobactam (ZOSYN) 3.375 g in 0.9% sodium chloride (MBP/ADV) 100 mL  3.375 g IntraVENous Q8H          Objective:     Vitals:  Blood pressure 155/87, pulse 61, temperature 97.1 °F (36.2 °C), resp. rate 13, height 5' 4\" (1.626 m), weight 88.5 kg (195 lb), SpO2 98 %, not currently breastfeeding.   Temp (24hrs), Av.7 °F (36.5 °C), Min:97.1 °F (36.2 °C), Max:98.1 °F (36.7 °C)      Intake and Output:      1901 -  0700  In:  [P.O.:1360; I.V.:700]  Out: 80 [YHBNK:8327; Drains:70]    Physical Exam:               GENERAL ASSESSMENT: NAD  CHEST: CTA  HEART: S1S2  ABDOMEN: Soft,NT  EXTREMITY: min EDEMA  NEURO:Grossly non focal          ECG/rhythm:    Data Review      No results for input(s): TNIPOC in the last 72 hours. No lab exists for component: ITNL   No results for input(s): CPK, CKMB, TROIQ in the last 72 hours. Recent Labs      03/13/17   0326  03/12/17   0355  03/11/17   2221   03/11/17   0331   NA  144  147*   --    --   145   K  3.2*  2.3*   --    --   2.3*   CL  108  110*   --    --   110*   CO2  29  31   --    --   25   BUN  17  25*   --    --   35*   CREA  1.26*  1.42*   --    --   1.59*   GLU  114*  130*   --    --   159*   PHOS   --    --    --    --   2.9   MG   --    --    --    --   2.0   CA  6.9*  6.8*   --    --   7.1*   ALB   --    --    --    --   1.9*   WBC  11.4*  11.6*  14.6*   --   13.5*   HGB  8.6*  8.4*  8.8*   < >  9.0*   HCT  29.6*  27.8*  28.2*   < >  29.4*   PLT  300  324  336   --   329    < > = values in this interval not displayed. Recent Labs      03/13/17   0326  03/12/17   0355  03/11/17   2120   APTT  25.9  28.2  59.5*     Needs: urine analysis, urine sodium, protein and creatinine  Lab Results   Component Value Date/Time    Sodium urine, random 32 03/08/2017 11:29 AM    Creatinine, urine 127.85 03/08/2017 11:29 AM           : Treasure Mcallister MD  3/13/2017        [facility]:  [email or web address]  Maria A Ramirez office:  [address] [place] [address], [address] [place] [address] [address]  [place], [address] [facility]  Phone: [phone]  Fax :     [phone]    [place] office:  [address] [facility]  Phone - [phone]  Fax - [phone]

## 2017-03-13 NOTE — ROUTINE PROCESS
Name: [facility]: [address]   : 1933 Admit Date: 3/7/2017   Phone: [phone]  Room: 336/01   PCP: James Cruz MD  MRN: [id number]   Date: 3/13/2017  Code: DNR          Chart and notes reviewed. Data reviewed. I review the patient's current medications in the medical record at each encounter. I have evaluated and examined the patient. Overnight events  Afebrile  Sats 98% on 2L; remained in upper 90s on RA  WBC 11.4 - improved  Hgb 8.6 - stabl  Creat 1.26 - improved  BLE VD negative for DVT  PFTs with restriction  K 3.2    ROS: Feeling much better this morning. SOB improved and denies further wheeze. Abd pain is controlled. Tolerating clears and hoping for diet to be advanced further today. Has not been OOB yet. Denies fever or chills. Denies CP. Denies LE pain/swelling. Current Facility-Administered Medications   Medication Dose Route Frequency    potassium chloride (KLOR-CON) packet 40 mEq  40 mEq Oral Q4H    pantoprazole (PROTONIX) tablet 40 mg  40 mg Oral ACB    amLODIPine (NORVASC) tablet 10 mg  10 mg Oral DAILY    bumetanide (BUMEX) tablet 0.5 mg  0.5 mg Oral DAILY    albuterol-ipratropium (DUO-NEB) 2.5 MG-0.5 MG/3 ML  3 mL Nebulization Q4H PRN    HYDROmorphone (PF) (DILAUDID) injection 0.5 mg  0.5 mg IntraVENous Q4H PRN    oxyCODONE IR (ROXICODONE) tablet 5 mg  5 mg Oral Q6H PRN    naloxone (NARCAN) injection 0.4 mg  0.4 mg IntraVENous PRN    acetaminophen (TYLENOL) tablet 650 mg  650 mg Oral Q4H PRN    diphenhydrAMINE (BENADRYL) injection 12.5 mg  12.5 mg IntraVENous Q4H PRN    ondansetron (ZOFRAN) injection 4 mg  4 mg IntraVENous Q4H PRN    piperacillin-tazobactam (ZOSYN) 3.375 g in 0.9% sodium chloride (MBP/ADV) 100 mL  3.375 g IntraVENous Q8H         REVIEW OF SYSTEMS   Negative except as stated in the HPI.       Physical Exam:   Visit Vitals    /87 (BP 1 Location: Left arm, BP Patient Position: At rest)    Pulse 61    Temp 97.1 °F (36.2 °C)    Resp 13    Ht 5' 4\" (1.626 m)    Wt 88.5 kg (195 lb)    SpO2 98%    Breastfeeding No    BMI 33.47 kg/m2       General:  Alert, cooperative, no distress, appears stated age. Head:  Normocephalic, without obvious abnormality, atraumatic. Eyes:  Conjunctivae/corneas clear. Nose: Nares normal. Septum midline. Mucosa normal.    Throat: Lips, mucosa, and tongue normal.    Neck: Supple, symmetrical, trachea midline, no adenopathy. Lungs:   CTAB with fine inspiratory crackles in the bilaterally. No wheeze or increased WOB. Chest wall:  No tenderness or deformity. Heart:  Regular rate and rhythm, S1, S2 normal, no murmur, click, rub or gallop. Abdomen:   Soft, mild tenderness to palpation, non-distended. Bowel sounds normal. No masses,  No organomegaly. Extremities: Extremities normal, atraumatic, no cyanosis or edema. Pulses: 2+ and symmetric all extremities. Skin: Skin color, texture, turgor normal. No rashes or lesions   Lymph nodes: Cervical, supraclavicular nodes normal.   Neurologic: Grossly nonfocal       Lab Results   Component Value Date/Time    Sodium 144 03/13/2017 03:26 AM    Potassium 3.2 03/13/2017 03:26 AM    Chloride 108 03/13/2017 03:26 AM    CO2 29 03/13/2017 03:26 AM    BUN 17 03/13/2017 03:26 AM    Creatinine 1.26 03/13/2017 03:26 AM    Glucose 114 03/13/2017 03:26 AM    Calcium 6.9 03/13/2017 03:26 AM    Magnesium 2.0 03/11/2017 03:31 AM    Phosphorus 2.9 03/11/2017 03:31 AM    Lactic acid 1.5 03/07/2017 05:38 PM       Lab Results   Component Value Date/Time    WBC 11.4 03/13/2017 03:26 AM    HGB 8.6 03/13/2017 03:26 AM    PLATELET 626 45/24/2622 03:26 AM    MCV 80.9 03/13/2017 03:26 AM       Lab Results   Component Value Date/Time    INR 1.3 03/09/2017 03:55 AM    aPTT 25.9 03/13/2017 03:26 AM    AST (SGOT) 25 03/11/2017 03:31 AM    Alk.  phosphatase 62 03/11/2017 03:31 AM    Protein, total 6.4 03/11/2017 03:31 AM    Albumin 1.9 03/11/2017 03:31 AM Globulin 4.5 03/11/2017 03:31 AM       Lab Results   Component Value Date/Time    Iron 144 07/29/2015 02:40 AM    TIBC 307 07/29/2015 02:40 AM    Iron % saturation 47 07/29/2015 02:40 AM    Ferritin 232 07/29/2015 02:40 AM       Lab Results   Component Value Date/Time    Sed rate (ESR) 80 05/20/2013 05:00 AM        No results found for: PH, PHI, PCO2, PCO2I, PO2, PO2I, HCO3, HCO3I, FIO2, FIO2I    Lab Results   Component Value Date/Time    CK 57 11/18/2015 05:10 PM    CK-MB Index 2.6 11/18/2015 05:10 PM    Troponin-I, Qt. <0.04 03/01/2017 10:42 PM        Lab Results   Component Value Date/Time    Culture result: Culture performed on Fluid swab specimen 03/09/2017 10:52 AM    Culture result: NO GROWTH 3 DAYS 03/09/2017 10:52 AM    Culture result: NO GROWTH 3 DAYS 03/09/2017 10:52 AM       No results found for: TOXA1, RPR, HBCM, HBSAG, HAAB, HCAB, HCAB1, HAAT, G6PD, CRYAC, HIVGT, HIVR, HIV1, HIV12, HIVPC, HIVRPI    Lab Results   Component Value Date/Time    CK 57 11/18/2015 05:10 PM    CK 46 05/18/2013 12:50 PM       Lab Results   Component Value Date/Time    Color DARK YELLOW 03/08/2017 11:29 AM    Appearance TURBID 03/08/2017 11:29 AM    Specific gravity 1.020 05/18/2013 02:10 PM    pH (UA) 5.0 03/08/2017 11:29 AM    Protein 100 03/08/2017 11:29 AM    Glucose NEGATIVE  03/08/2017 11:29 AM    Ketone NEGATIVE  03/08/2017 11:29 AM    Bilirubin NEGATIVE  03/08/2017 11:29 AM    Blood LARGE 03/08/2017 11:29 AM    Urobilinogen 0.2 03/08/2017 11:29 AM    Nitrites NEGATIVE  03/08/2017 11:29 AM    Leukocyte Esterase NEGATIVE  03/08/2017 11:29 AM    WBC 5-10 03/08/2017 11:29 AM    RBC 10-20 03/08/2017 11:29 AM    Bacteria NEGATIVE  03/08/2017 11:29 AM       Images: no new images this morning    IMPRESSION  · Cholelithiasis with acute cholecystitis and gangrene of the gallbladder; s/p laparoscopic cholecystectomy     · Metabolic Acidosis: secondary to sepsis from cholecystitis: improved with LR  · Acute Pulmonary Edema: improved · Acute/chronic renal failure: improved  · ILD: stable; may be secondary to RA   · Anemia with history of iron deficiency   · Hx of PE/DVT on Coumadin at baseline   · RA   · GERD   · HTN      PLAN  · Gentle diuresis with Bumex  · O2 if needed to keep sats > 90%; 2L O2 at baseline as needed with activity  · Duonebs prn  · IS  · Diet: clear liquids; advance per Surgery  · PT/OT and OOB to chair  · Abx per Surgery/rpimary team  · Replete K  · Patient has been advised to stop methotrexate in the past as it may be contributing to her ILD. She has elected to continue taking it. Her ILD appears stable at this time. · Patient would benefit from outpatient pulmonary follow up. · GI prophylaxis: Protonix  · DVT prophylaxis: Coumadin when okay with surgery    Patient is stable from a pulmonary standpoint. We will sign off and be available as needed. Pleas call with questions.     Alexis Jerry, [phone]

## 2017-03-13 NOTE — PROGRESS NOTES
3- CASE MANAGEMENT NOTE:  I met with the pt and her son, Jerman Gutierrez ([phone]), to introduce myself and explain the role of case management. The son explained that he recently retired but will be returning to at least part-time employment soon. He is anxious for his mother to receive rehab and [facility] is his first choice. I sent updated medicals to include the pt's new face sheet showing her secondary insurance. He has been using [employer] [facility]  for private aides twice weekly and plans to increase the hours as needed depending on the pt's needs after rehab. He is not interested in long-term placement or moving the pt to [place] where his brother and sister live. CM will continue to follow.  Curtis Babin, BSW, CM

## 2017-03-13 NOTE — PROGRESS NOTES
Problem: Mobility Impaired (Adult and Pediatric)  Goal: *Acute Goals and Plan of Care (Insert Text)  Physical Therapy Goals  Initiated 3/13/2017  1. Patient will move from supine to sit and sit to supine , scoot up and down and roll side to side in bed with supervision/set-up within 7 day(s). 2. Patient will transfer from bed to chair and chair to bed with supervision/set-up using the least restrictive device within 7 day(s). 3. Patient will perform sit to stand with supervision/set-up within 7 day(s). 4. Patient will ambulate with minimal assistance/contact guard assist for 100 feet with the least restrictive device within 7 day(s). PHYSICAL THERAPY EVALUATION  Patient: Annie Payne (27 y.o. female)  Date: 3/13/2017  Primary Diagnosis: Acute cholecystitis  gallstones  Procedure(s) (LRB):  CHOLECYSTECTOMY LAPAROSCOPIC (N/A) 4 Days Post-Op   Precautions:   Fall, DNR      ASSESSMENT :  Based on the objective data described below, the patient presents with generalized weakness and debility. Sit on the edge of bed with max assist, sit to stand total assist x 2, attempted to ambulate patient unable to stand straight, unable to step foot forward. assisted back to the edge of bed and stand pivot to the chair total assist x 2. Sitting balance poor need some support at times patient likes to lean back dumont. Son in the room assisting with transfer and agreed with all goals set for the patient. OOB to chair as tolerated, performed some active range of motion exercise on both LE all planes. Notified nurse and agreed to monitor and assist back to bed when ask. Patient will benefit from skilled intervention to address the above impairments.   Patients rehabilitation potential is considered to be Excellent  Factors which may influence rehabilitation potential include:   [ ]         None noted  [ ]         Mental ability/status  [X]         Medical condition  [ ]         Home/family situation and support systems  [X] Safety awareness  [ ]         Pain tolerance/management  [ ]         Other:        PLAN :  Recommendations and Planned Interventions:  [X]           Bed Mobility Training             [ ]    Neuromuscular Re-Education  [X]           Transfer Training                   [ ]    Orthotic/Prosthetic Training  [X]           Gait Training                         [ ]    Modalities  [X]           Therapeutic Exercises           [ ]    Edema Management/Control  [X]           Therapeutic Activities            [ ]    Patient and Family Training/Education  [ ]           Other (comment):     Frequency/Duration: Patient will be followed by physical therapy  5 times a week to address goals. Discharge Recommendations: Rehab  Further Equipment Recommendations for Discharge: already has DME's       SUBJECTIVE:   Patient stated Ok .       OBJECTIVE DATA SUMMARY:   HISTORY:    Past Medical History:   Diagnosis Date    Acid reflux      Arthritis      Hx pulmonary embolism      Hypertension      [facility])       Past Surgical History:   Procedure Laterality Date    HX HYSTERECTOMY        HX KNEE REPLACEMENT         right    HX LUMBAR FUSION        HX TONSILLECTOMY         Prior Level of Function/Home Situation: modified independent with rolling walker at times and has a lift chair at home.   Personal factors and/or comorbidities impacting plan of care:      Home Situation  Home Environment: Private residence  # Steps to Enter: 3  Rails to Enter: Yes  Hand Rails : Right  One/Two Story Residence: Two story  # of Interior Steps: 14  Lift Chair Available: Yes  Living Alone: No  Support Systems: Child(jazmin) (lives with son and daughter in law)  Patient Expects to be Discharged to[de-identified] Private residence  Current DME Used/Available at Home: Walker, rolling, Grab bars, Wheelchair, Oxygen, portable  Tub or Shower Type: Shower     EXAMINATION/PRESENTATION/DECISION MAKING:   Critical Behavior:  Neurologic State: Alert  Orientation Level: Oriented X4  Cognition: Appropriate decision making, Appropriate for age attention/concentration, Follows commands  Safety/Judgement: Fall prevention, Home safety, Awareness of environment  Hearing: Auditory  Auditory Impairment: None  Hearing Aids/Status: Does not own        Range Of Motion:  AROM: Generally decreased, functional                       Strength:    Strength: Generally decreased, functional                    Tone & Sensation:                                  Coordination:  Coordination: Generally decreased, functional  Vision:      Functional Mobility:  Bed Mobility:  Rolling: Maximum assistance; Additional time;Assist x2  Supine to Sit: Maximum assistance; Additional time;Assist x2  Sit to Supine: Maximum assistance     Transfers:  Sit to Stand: Total assistance; Additional time;Assist x2  Stand to Sit: Total assistance; Additional time;Assist x2  Stand Pivot Transfers: Total assistance     Bed to Chair: Additional time;Assist x2              Balance:   Sitting: Impaired; With support  Sitting - Static: Poor (constant support)  Sitting - Dynamic: Poor (constant support)  Standing: Impaired;Pull to stand; With support  Standing - Static: Poor  Standing - Dynamic : Poor  Ambulation/Gait Training:  Distance (ft): 0 Feet (ft)  Assistive Device: Walker, rolling;Gait belt  Ambulation - Level of Assistance: Total assistance;Assist x2     Gait Description (WDL): Exceptions to WDL  Gait Abnormalities: Path deviations        Base of Support: Narrowed     Speed/Giselle: Fluctuations                 Therapeutic Exercises:    Instructed patient to continue active range of motion exercise on both legs while up on chair or on bed.          Functional Measure:  Barthel Index:      Bathin  Bladder: 5  Bowels: 0  Groomin  Dressin  Feeding: 10  Mobility: 5  Stairs: 0  Toilet Use: 0  Transfer (Bed to Chair and Back): 5  Total: 35         Barthel and G-code impairment scale:  Percentage of impairment CH  0% CI  1-19% CJ  20-39% CK  40-59% CL  60-79% CM  80-99% CN  100%   Barthel Score 0-100 100 99-80 79-60 59-40 20-39 1-19    0   Barthel Score 0-20 20 17-19 13-16 9-12 5-8 1-4 0      The Barthel ADL Index: Guidelines  1. The index should be used as a record of what a patient does, not as a record of what a patient could do. 2. The main aim is to establish degree of independence from any help, physical or verbal, however minor and for whatever reason. 3. The need for supervision renders the patient not independent. 4. A patient's performance should be established using the best available evidence. Asking the patient, friends/relatives and nurses are the usual sources, but direct observation and common sense are also important. However direct testing is not needed. 5. Usually the patient's performance over the preceding 24-48 hours is important, but occasionally longer periods will be relevant. 6. Middle categories imply that the patient supplies over 50 per cent of the effort. 7. Use of aids to be independent is allowed. Dickson Cho., Barthel, DCriseldaW. (4350). Functional evaluation: the Barthel Index. [facility] (14)2. Kaylee Luque fauzia Reyes, BELLAJCriseldaMCriseldaF, Fredy Malcolm., Juan Luis Rodgers., [place], [address] [place] [address] (1999). Measuring the change indisability after inpatient rehabilitation; comparison of the responsiveness of the Barthel Index and Functional [place] Measure. Journal of Neurology, Neurosurgery, and Psychiatry, 66(4), 349-782. Josue Serrano, N.J.A, ARNOL Hull, & Lexa Reyes, MCriseldaA. (2004.) Assessment of post-stroke quality of life in cost-effectiveness studies: The usefulness of the Barthel Index and the EuroQoL-5D. Quality of Life Research, 13, 168-03         G codes: In compliance with CMSs Claims Based Outcome Reporting, the following G-code set was chosen for this patient based on their primary functional limitation being treated:      The outcome measure chosen to determine the severity of the functional limitation was the Barthel Index with a score of 35/100 which was correlated with the impairment scale. · Mobility - Walking and Moving Around:               - CURRENT STATUS:    CL - 60%-79% impaired, limited or restricted               - GOAL STATUS:           CK - 40%-59% impaired, limited or restricted               - D/C STATUS:                       ---------------To be determined---------------      Physical Therapy Evaluation Charge Determination   History Examination Presentation Decision-Making   LOW Complexity : Zero comorbidities / personal factors that will impact the outcome / POC LOW Complexity : 1-2 Standardized tests and measures addressing body structure, function, activity limitation and / or participation in recreation  LOW Complexity : Stable, uncomplicated  Other outcome measures barthel  MEDIUM      Based on the above components, the patient evaluation is determined to be of the following complexity level: LOW      Pain:  Pain Scale 1: Numeric (0 - 10)  Pain Intensity 1: 0              Activity Tolerance:   Good. Please refer to the flowsheet for vital signs taken during this treatment. After treatment:   [X]         Patient left in no apparent distress sitting up in chair  [ ]         Patient left in no apparent distress in bed  [X]         Call bell left within reach  [X]         Nursing notified  [X]         Caregiver present  [ ]         Bed alarm activated      COMMUNICATION/EDUCATION:   The patients plan of care was discussed with: Physician and patient. [X]         Fall prevention education was provided and the patient/caregiver indicated understanding. [X]         Patient/family have participated as able in goal setting and plan of care. [X]         Patient/family agree to work toward stated goals and plan of care.   [ ]         Patient understands intent and goals of therapy, but is neutral about his/her participation. [ ]         Patient is unable to participate in goal setting and plan of care. Thank you for this referral.  Chidi Olsen, PT,WCC.    Time Calculation: 25 mins

## 2017-03-13 NOTE — PROGRESS NOTES
NUTRITION    RECOMMENDATIONS:     1. Recommend Roeslia Q daily  2. Advance diet per surgery  3. RD added Ensure Clear to diet as pt has been receiving liquids ( except maybe a couple meals ) x 6 days. ASSESSMENT:   Patient seen d/t clear liquid diet x 6 days. On the 11 th diet advance to regular for ~ a couple meals. Admitted with N/V, spt lap cholecystectomy 3/9/17. Pt and son c/o pt with diarrhea. They deny she has had any weight changes and usually eats very well at home. She agreed to Ensure Clear while receiving clear liquids. Suggest Roselia Q to help decrease diarrhea while receiving abx. Past Medical History:   Diagnosis Date    Acid reflux     Arthritis     Hx pulmonary embolism     Hypertension     IPF [facility]idiopathic pulmonary fibrosis) ([facility])        Diet: clear liquids    Abd:   Diarrhea, active bs         BM: 3/13    Skin Integrity: []Intact  [x]Other: lap incision from surgery, VELMA drains x 2  Edema: []None [x]Other: LLE, RLE    Nutritionally Significant Medications: [x] Reviewed    Labs:    Lab Results   Component Value Date/Time    Sodium 144 03/13/2017 03:26 AM    Potassium 3.2 03/13/2017 03:26 AM    Chloride 108 03/13/2017 03:26 AM    CO2 29 03/13/2017 03:26 AM    Anion gap 7 03/13/2017 03:26 AM    Glucose 114 03/13/2017 03:26 AM    BUN 17 03/13/2017 03:26 AM    Creatinine 1.26 03/13/2017 03:26 AM    Calcium 6.9 03/13/2017 03:26 AM    Magnesium 2.0 03/11/2017 03:31 AM    Phosphorus 2.9 03/11/2017 03:31 AM    Albumin 1.9 03/11/2017 03:31 AM       Anthropometrics:   Weight Source:  (3/13 9984)  Height: 5' 4\" (162.6 cm),    Body mass index is 33.47 kg/(m^2).   IBW : 54.4 kg (120 lb), % IBW (Calculated): 162.5 %,  ,    Wt Readings from Last 5 Encounters:   03/13/17 88.5 kg (195 lb)   03/01/17 90.7 kg (200 lb)   11/18/15 84.4 kg (186 lb)   07/26/15 83.9 kg (185 lb)   05/18/13 86.3 kg (190 lb 3.2 oz)       Estimated Daily Nutrition Requirements:   Weight Used: Actual wt  Kcals: 1933 Kcals/day Based on:Jack Tabares (x 1.3)  Protein: 88 g (1 gm/kg)   Fluid: 2200 ml      Education & Discharge Needs:   [] Pt discussed in ID rounds     Nutrition related discharge needs addressed:     [] Supplements (on d/c instruction &/or coupons provided)    [] Tube Feedings     [] Education    [x]No nutrition related discharge needs at this time     Cultural, Sabianist and ethnic food preferences identified    [x] None   [] Yes     NUTRITION DIAGNOSIS:     Inadequate oral food/beverage intake related to s/p lap cholecystectomy  as evidenced by clear liquid diet x 6 days                     Pt is at Nutrition Risk:  [x]    No Nutrition Risk Identified:  []    RD INTERVENTION / PT GOALS:     Food/Nutrient Delivery:   , Supplements: Commercial supplement (Ensure Clear Q meal),  ,  ,    Nutrition Education: ,    Nutrition Counseling:    Coordination of Care:      Goal: Pt will tolerate diet advancement to solids within 1-3 days    MONITORING & EVALUATION:   Food/Nutrient Intake Outcomes:  Total energy intake, Liquid meal replacement          Previous Nutrition Goals:  Previous Goal Met: N/A  Previous Recommendations:      Previous Recommendations Implemented: N/A       Lizzy Holland RD

## 2017-03-14 LAB
ANION GAP BLD CALC-SCNC: 7 MMOL/L (ref 5–15)
BUN SERPL-MCNC: 15 MG/DL (ref 6–20)
BUN/CREAT SERPL: 13 (ref 12–20)
CALCIUM SERPL-MCNC: 7.4 MG/DL (ref 8.5–10.1)
CHLORIDE SERPL-SCNC: 107 MMOL/L (ref 97–108)
CO2 SERPL-SCNC: 30 MMOL/L (ref 21–32)
CREAT SERPL-MCNC: 1.19 MG/DL (ref 0.55–1.02)
GLUCOSE SERPL-MCNC: 84 MG/DL (ref 65–100)
HCT VFR BLD AUTO: 32.1 % (ref 35–47)
HGB BLD-MCNC: 9.7 G/DL (ref 11.5–16)
INR PPP: 1.3 (ref 0.9–1.1)
POTASSIUM SERPL-SCNC: 3.8 MMOL/L (ref 3.5–5.1)
PROTHROMBIN TIME: 13.7 SEC (ref 9–11.1)
SODIUM SERPL-SCNC: 144 MMOL/L (ref 136–145)

## 2017-03-14 PROCEDURE — 97530 THERAPEUTIC ACTIVITIES: CPT

## 2017-03-14 PROCEDURE — 74011250637 HC RX REV CODE- 250/637: Performed by: INTERNAL MEDICINE

## 2017-03-14 PROCEDURE — 74011250637 HC RX REV CODE- 250/637: Performed by: PHYSICIAN ASSISTANT

## 2017-03-14 PROCEDURE — 80048 BASIC METABOLIC PNL TOTAL CA: CPT | Performed by: INTERNAL MEDICINE

## 2017-03-14 PROCEDURE — 74011636637 HC RX REV CODE- 636/637: Performed by: INTERNAL MEDICINE

## 2017-03-14 PROCEDURE — 74011250636 HC RX REV CODE- 250/636: Performed by: INTERNAL MEDICINE

## 2017-03-14 PROCEDURE — 97110 THERAPEUTIC EXERCISES: CPT

## 2017-03-14 PROCEDURE — 85018 HEMOGLOBIN: CPT | Performed by: INTERNAL MEDICINE

## 2017-03-14 PROCEDURE — 65270000029 HC RM PRIVATE

## 2017-03-14 PROCEDURE — 85610 PROTHROMBIN TIME: CPT | Performed by: INTERNAL MEDICINE

## 2017-03-14 PROCEDURE — 74011000258 HC RX REV CODE- 258: Performed by: INTERNAL MEDICINE

## 2017-03-14 PROCEDURE — 36415 COLL VENOUS BLD VENIPUNCTURE: CPT | Performed by: INTERNAL MEDICINE

## 2017-03-14 RX ORDER — WARFARIN SODIUM 5 MG/1
5 TABLET ORAL ONCE
Status: COMPLETED | OUTPATIENT
Start: 2017-03-14 | End: 2017-03-14

## 2017-03-14 RX ORDER — NEBIVOLOL 2.5 MG/1
5 TABLET ORAL DAILY
Status: DISCONTINUED | OUTPATIENT
Start: 2017-03-14 | End: 2017-03-15 | Stop reason: HOSPADM

## 2017-03-14 RX ADMIN — PREDNISONE 5 MG: 5 TABLET ORAL at 09:24

## 2017-03-14 RX ADMIN — PANTOPRAZOLE SODIUM 40 MG: 40 TABLET, DELAYED RELEASE ORAL at 06:42

## 2017-03-14 RX ADMIN — WARFARIN SODIUM 5 MG: 5 TABLET ORAL at 17:38

## 2017-03-14 RX ADMIN — PIPERACILLIN SODIUM,TAZOBACTAM SODIUM 3.38 G: 3; .375 INJECTION, POWDER, FOR SOLUTION INTRAVENOUS at 16:36

## 2017-03-14 RX ADMIN — OXYCODONE HYDROCHLORIDE 5 MG: 5 TABLET ORAL at 11:04

## 2017-03-14 RX ADMIN — AMLODIPINE BESYLATE 10 MG: 5 TABLET ORAL at 09:23

## 2017-03-14 RX ADMIN — PIPERACILLIN SODIUM,TAZOBACTAM SODIUM 3.38 G: 3; .375 INJECTION, POWDER, FOR SOLUTION INTRAVENOUS at 00:34

## 2017-03-14 RX ADMIN — PIPERACILLIN SODIUM,TAZOBACTAM SODIUM 3.38 G: 3; .375 INJECTION, POWDER, FOR SOLUTION INTRAVENOUS at 09:24

## 2017-03-14 RX ADMIN — BUMETANIDE 0.5 MG: 1 TABLET ORAL at 09:24

## 2017-03-14 RX ADMIN — NEBIVOLOL HYDROCHLORIDE 5 MG: 2.5 TABLET ORAL at 13:48

## 2017-03-14 NOTE — PROGRESS NOTES
Bedside and Verbal shift change report given to Yasmany Palencia RN (oncoming nurse) by Boston Moses RN (offgoing nurse). Report included the following information SBAR and Kardex.

## 2017-03-14 NOTE — PROGRESS NOTES
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 To try solids today. ROS: no n/v/sob  Current Facility-Administered Medications   Medication Dose Route Frequency    warfarin (COUMADIN) tablet 5 mg  5 mg Oral ONCE    predniSONE (DELTASONE) tablet 5 mg  5 mg Oral DAILY    warfarin - pharmacist to dose   Other Rx Dosing/Monitoring    pantoprazole (PROTONIX) tablet 40 mg  40 mg Oral ACB    amLODIPine (NORVASC) tablet 10 mg  10 mg Oral DAILY    bumetanide (BUMEX) tablet 0.5 mg  0.5 mg Oral DAILY    albuterol-ipratropium (DUO-NEB) 2.5 MG-0.5 MG/3 ML  3 mL Nebulization Q4H PRN    HYDROmorphone (PF) (DILAUDID) injection 0.5 mg  0.5 mg IntraVENous Q4H PRN    oxyCODONE IR (ROXICODONE) tablet 5 mg  5 mg Oral Q6H PRN    naloxone (NARCAN) injection 0.4 mg  0.4 mg IntraVENous PRN    acetaminophen (TYLENOL) tablet 650 mg  650 mg Oral Q4H PRN    diphenhydrAMINE (BENADRYL) injection 12.5 mg  12.5 mg IntraVENous Q4H PRN    ondansetron (ZOFRAN) injection 4 mg  4 mg IntraVENous Q4H PRN    piperacillin-tazobactam (ZOSYN) 3.375 g in 0.9% sodium chloride (MBP/ADV) 100 mL  3.375 g IntraVENous Q8H          Objective:     Vitals:  Blood pressure 172/73, pulse 80, temperature 98.7 °F (37.1 °C), resp. rate 20, height 5' 4\" (1.626 m), weight 88.5 kg (195 lb), SpO2 97 %, not currently breastfeeding. Temp (24hrs), Av.1 °F (36.7 °C), Min:97.9 °F (36.6 °C), Max:98.7 °F (37.1 °C)      Intake and Output:  701 - 1900  In: -   Out: 2 [Urine:1]  1901 -  07  In: 7910 [P.O.:730;  I.V.:400]  Out: 1550 [Urine:1400; Drains:50]    Physical Exam:               GENERAL ASSESSMENT: NAD  CHEST: CTA  HEART: S1S2  ABDOMEN: Soft,NT  EXTREMITY: min EDEMA  NEURO:Grossly non focal          ECG/rhythm:    Data Review      No results for input(s): TNIPOC in the last 72 hours. No lab exists for component: ITNL   No results for input(s): CPK, CKMB, TROIQ in the last 72 hours. Recent Labs      03/14/17   0647  03/14/17   0413  03/13/17   0326  03/12/17   0355  03/11/17   2221   NA   --   144  144  147*   --    K   --   3.8  3.2*  2.3*   --    CL   --   107  108  110*   --    CO2   --   30  29  31   --    BUN   --   15  17  25*   --    CREA   --   1.19*  1.26*  1.42*   --    GLU   --   84  114*  130*   --    CA   --   7.4*  6.9*  6.8*   --    WBC   --    --   11.4*  11.6*  14.6*   HGB  9.7*   --   8.6*  8.4*  8.8*   HCT  32.1*   --   29.6*  27.8*  28.2*   PLT   --    --   300  324  336      Recent Labs      03/14/17   0413  03/13/17   1232  03/13/17   0326  03/12/17   0355  03/11/17   2120   INR  1.3*  1.4*   --    --    --    PTP  13.7*  13.8*   --    --    --    APTT   --    --   25.9  28.2  59.5*     Needs: urine analysis, urine sodium, protein and creatinine  Lab Results   Component Value Date/Time    Sodium urine, random 32 03/08/2017 11:29 AM    Creatinine, urine 127.85 03/08/2017 11:29 AM           : Braxton Vyas MD  3/14/2017        [facility]:  [email or web address]  Alber Nuria office:  [address] [place] [address], [address] [place] [address] [address]  [place], [address] [facility]  Phone: [phone]  Fax :     [phone]    [facility] office:  [address] [facility], [address]  Phone - [phone]  Fax - [phone]

## 2017-03-14 NOTE — PROGRESS NOTES
11:14 AM Patient tolerating clears, asked Treutlen Mask to advance, NP states to advance to gi lite. 11:27 AM 2 VELMA drains, uncharged and removed, 4x4 dry dressing placed. Patient tolerated well.

## 2017-03-14 NOTE — PROGRESS NOTES
Bedside and Verbal shift change report given to Satnam Farias (oncoming nurse) by Sandro Ferrer (offgoing nurse). Report included the following information SBAR, Kardex, OR Summary, Intake/Output, MAR and Recent Results.

## 2017-03-14 NOTE — PROGRESS NOTES
Interdisciplinary team rounds were held 3/14/2017 with the following team members:Care Management, Nursing, Nutrition and Physical Therapy and the primary RN. Plan of care discussed. See clinical pathway and/or care plan for interventions and desired outcomes.     Discharge unknown at this time

## 2017-03-14 NOTE — PROGRESS NOTES
[facility] [address], [place], [address] [facility]  [phone]      Medical Progress Note      NAME:         Alfredo Bowman   :        1933  MRM:        [id number]    Date:          3/14/2017      Subjective:  Ms Coburn feels better today. She is now off oxygen. Minimal abdominal discomfort. No fever or chills. No nausea, vomiting      Objective:    Vital Signs:    Visit Vitals    /73 (BP 1 Location: Right arm)    Pulse 80    Temp 98.7 °F (37.1 °C)    Resp 20    Ht 5' 4\" (1.626 m)    Wt 88.5 kg (195 lb)    SpO2 97%    Breastfeeding No    BMI 33.47 kg/m2          Intake/Output Summary (Last 24 hours) at 17 1115  Last data filed at 17 1042   Gross per 24 hour   Intake              340 ml   Output               32 ml   Net              308 ml        Physical Examination:    General:   Weak looking elderly female who is not in distress      Eyes:   pink conjunctivae, PERRLA with no discharge. ENT:   no ottorrhea or rhinorrhea with dry mucous membranes  Neck: no masses, thyroid non-tender and trachea central.  Pulm: generally decreased clear breath sounds without crackles or wheezes  Card:  has regular and normal S1, S2 without thrills, bruits or peripheral edema  Abd:  Soft, mild discomfort, non-distended, normoactive bowel sounds   Musc:  No cyanosis, clubbing, atrophy or deformities. Skin:  No rashes, bruising or ulcers. Neuro: Awake and alert.  Generally a non focal exam.   Psych:  Has a fair insight and is oriented x 3    Current Facility-Administered Medications   Medication Dose Route Frequency    predniSONE (DELTASONE) tablet 5 mg  5 mg Oral DAILY    warfarin - pharmacist to dose   Other Rx Dosing/Monitoring    pantoprazole (PROTONIX) tablet 40 mg  40 mg Oral ACB    amLODIPine (NORVASC) tablet 10 mg  10 mg Oral DAILY    bumetanide (BUMEX) tablet 0.5 mg  0.5 mg Oral DAILY    albuterol-ipratropium (DUO-NEB) 2.5 MG-0.5 MG/3 ML  3 mL Nebulization Q4H PRN    HYDROmorphone (PF) (DILAUDID) injection 0.5 mg  0.5 mg IntraVENous Q4H PRN    oxyCODONE IR (ROXICODONE) tablet 5 mg  5 mg Oral Q6H PRN    naloxone (NARCAN) injection 0.4 mg  0.4 mg IntraVENous PRN    acetaminophen (TYLENOL) tablet 650 mg  650 mg Oral Q4H PRN    diphenhydrAMINE (BENADRYL) injection 12.5 mg  12.5 mg IntraVENous Q4H PRN    ondansetron (ZOFRAN) injection 4 mg  4 mg IntraVENous Q4H PRN    piperacillin-tazobactam (ZOSYN) 3.375 g in 0.9% sodium chloride (MBP/ADV) 100 mL  3.375 g IntraVENous Q8H        Laboratory data and review:    Recent Labs      03/14/17   0647  03/13/17   0326  03/12/17   0355  03/11/17   2221   WBC   --   11.4*  11.6*  14.6*   HGB  9.7*  8.6*  8.4*  8.8*   HCT  32.1*  29.6*  27.8*  28.2*   PLT   --   300  324  336     Recent Labs      03/14/17   0413  03/13/17   1232  03/13/17   0326  03/12/17   0355   NA  144   --   144  147*   K  3.8   --   3.2*  2.3*   CL  107   --   108  110*   CO2  30   --   29  31   GLU  84   --   114*  130*   BUN  15   --   17  25*   CREA  1.19*   --   1.26*  1.42*   CA  7.4*   --   6.9*  6.8*   INR  1.3*  1.4*   --    --      No components found for: [place]    Other Diagnostics:    Telemetry reviewed:   normal sinus rhythm    Assessment and Plan:    81 yo WF w/ hx of IPF, HTN, DVT, recent admission for bronchitis p/w abdominal pain, found to have acute cholecystitis     Acute cholecystitis with sepsis POA: Continues to show good progress. Minimal diarrhea. C. Diff negative. S/p lap guillermina, found to have gangrenous GB. Drains being removed today. Continue IV Zosyn (day 7 today) PT, OT as tolerated. Rehab once stable. ARF (acute renal failure) / Hyponatremia / CKD (chronic kidney disease) stage 3/ Hypokalemia: Cr better and stable. Adequate urine output. Continue low dose bumex.     Chronic respiratory failure with hypoxia / IPF (idiopathic pulmonary fibrosis): Noted on recent CT chest. Seen by Pulmonology. Currently not on any oxygen. Continue supplemental O2 as needed, duo-nebs PRN      Rheumatoid arthritis: Continue low dose prednisone. Methotrexate on hold.        Hx of deep venous thrombosis / Hx pulmonary embolism: On coumadin at home. Doppler extremity dopplers negative. Hgb stable. Continue Coumadin per pharmacy protocol     Hypertension: BP variable. Continue Amlodipine    Weakness / Debilitated patient: severe. Continue PT/OT. Rehab at discharge        Anemia, unspecified: with component of acute blood loss anemia: Hgb relatively stable. stable. Monitor       Esophageal reflux: stable.  Continue PPI     Total time spent for the patient's care: [address] discussed with: Patient, Nursing Staff and consulting physicians    Discussed:  Care Plan    Prophylaxis:  Coumadin    Anticipated Disposition:  [facility]/Rehab           ___________________________________________________    Attending Physician:   Joanna Talavera MD

## 2017-03-14 NOTE — PROGRESS NOTES
Bedside and Verbal shift change report given to [address] (oncoming nurse) by Jazlyn Godfrey (offgoing nurse). Report included the following information SBAR, Kardex, MAR and Recent Results.

## 2017-03-14 NOTE — PROGRESS NOTES
Problem: Self Care Deficits Care Plan (Adult)  Goal: *Acute Goals and Plan of Care (Insert Text)  Occupational Therapy Goals  Initiated 3/11/2017  1. Patient will perform grooming and bathing in seated position with supervision/set-up within 7 day(s). 2. Patient will perform upper body dressing with minimal assistance/contact guard assist within 7 day(s). 3. Patient will perform lower body dressing with maximal assistance within 7 day(s). 4. Patient will perform BSC/toilet transfers with maximal assistance within 7 day(s). 5. Patient will perform all aspects of toileting with maximal assistance within 7 day(s). 6. Patient will participate in upper extremity therapeutic exercise/activities with supervision/set-up and two rest breaks for 8 minutes within 7 day(s). OCCUPATIONAL THERAPY TREATMENT  Patient: Carolyn Cummings (37 y.o. female)  Date: 3/14/2017  Diagnosis: Acute cholecystitis  gallstones Acute cholecystitis  Procedure(s) (LRB):  CHOLECYSTECTOMY LAPAROSCOPIC (N/A) 5 Days Post-Op  Precautions: Fall, DNR  Chart, occupational therapy assessment, plan of care, and goals were reviewed. ASSESSMENT:  Pt agreeable to OT. She needed max assist x 2 for supine to sit. Initially with poor sitting balance but with cues static balance increased to fair. She tends to lean to the right and posterior. Pts son present for treatment and stated his mom maintaining her balance better than prior therapy session. She was able to reach anterior and across her body to touch therapist's hand and with verbal cueing could improve her balance. Pt stood with max assist x 2 and in prep for ADL's. Pt max assist for LB bathing and dressing limited by weakness in LE's. Recommend rehab.   Progression toward goals:  [ ]          Improving appropriately and progressing toward goals  [X]          Improving slowly and progressing toward goals  [ ]          Not making progress toward goals and plan of care will be adjusted PLAN:  Patient continues to benefit from skilled intervention to address the above impairments. Continue treatment per established plan of care. Discharge Recommendations:  Rehab  Further Equipment Recommendations for Discharge:  None       SUBJECTIVE:   Patient stated Take it easy on me.       OBJECTIVE DATA SUMMARY:   Cognitive/Behavioral Status:  Neurologic State: Alert  Orientation Level: Oriented X4  Cognition: Follows commands           Functional Mobility and Transfers for ADLs:              Bed Mobility:  Rolling: Maximum assistance;Assist x2  Supine to Sit: Maximum assistance;Assist x2                    Transfers:      Not attempted. Balance:  Sitting: Impaired;High guard; With support  Sitting - Static: Fair (occasional); Poor (constant support)  Sitting - Dynamic: Poor (constant support)  ADL Intervention:                    Lower Body Bathing  Bathing Assistance: Maximum assistance           Lower Body Dressing Assistance  Dressing Assistance: Maximum assistance     Toileting  Toileting Assistance: Maximum assistance      Therapeutic Exercises:   Pt worked on reaching anterior and across her body 5 x to increase core strength and sitting balance. Pain:  Pain Scale 1: Numeric (0 - 10)  Pain Intensity 1: 0                 Activity Tolerance:    Fair  Please refer to the flowsheet for vital signs taken during this treatment.   After treatment:   [ ]  Patient left in no apparent distress sitting up in chair  [X]  Patient left in no apparent distress in bed  [X]  Call bell left within reach  [X]  Nursing notified  [X]  Caregiver present  [ ]  Bed alarm activated      COMMUNICATION/COLLABORATION:   The patients plan of care was discussed with: Physical Therapy Assistant, Occupational Therapist and Registered Nurse     Hernan Orrville Franck, GILBERT/L  Time Calculation: 30 mins

## 2017-03-14 NOTE — PROGRESS NOTES
General Surgery    Pt doing well, pain controlled, tolerating diet  Abdominal exam appropriate  Incisions healing well, VELMA SSx2  D/c VELMA drains, f/u in office in 2 weeks. OK for Coumadin as discussed with hospitalist yesterday.

## 2017-03-14 NOTE — PROGRESS NOTES
[facility] Dosing Services: 3/14/17    Consult for Warfarin Dosing by Pharmacy by Dr. Jennifer Hi provided for this 80 y.o. female , for indication of DVT/PE history . PTA Dose: 5 mg on Monday and Thursday, 2.5 mg rest of week  Dose to achieve an INR goal of 2-3    Order entered for  Warfarin  5 (mg) ordered to be given today at 18:00. Significant drug interactions: Vitamin K 10 mg IVPB 3/8 (prior to surgery)  Previous dose given 5mg   PT/INR Lab Results   Component Value Date/Time    INR 1.3 03/14/2017 04:13 AM      Platelets Lab Results   Component Value Date/Time    PLATELET 013 76/40/5579 03:26 AM      H/H Lab Results   Component Value Date/Time    HGB 9.7 03/14/2017 06:47 AM        Pharmacy to follow daily and will provide subsequent Warfarin dosing based on clinical status. Lokesh Gregorio, [address] [address] [phone]

## 2017-03-14 NOTE — PROGRESS NOTES
Problem: Mobility Impaired (Adult and Pediatric)  Goal: *Acute Goals and Plan of Care (Insert Text)  Physical Therapy Goals  Initiated 3/13/2017  1. Patient will move from supine to sit and sit to supine , scoot up and down and roll side to side in bed with supervision/set-up within 7 day(s). 2. Patient will transfer from bed to chair and chair to bed with supervision/set-up using the least restrictive device within 7 day(s). 3. Patient will perform sit to stand with supervision/set-up within 7 day(s). 4. Patient will ambulate with minimal assistance/contact guard assist for 100 feet with the least restrictive device within 7 day(s). PHYSICAL THERAPY TREATMENT  Patient: Syed Oropeza (7 y.o. female)  Date: 3/14/2017  Diagnosis: Acute cholecystitis  gallstones Acute cholecystitis  Procedure(s) (LRB):  CHOLECYSTECTOMY LAPAROSCOPIC (N/A) 5 Days Post-Op  Precautions: Fall, DNR  Chart, physical therapy assessment, plan of care and goals were reviewed. ASSESSMENT:  Pt supine to sit with max assist of 2. Pt min assist sitting on EOB. Pt is fearful off falling but is motivated for PT. Pt to stand with mod to max of 2 with bed elevated. Pt was able to stand with RW mod assist of 2. Pt was unable to advance feet standing with RW. Pt was returned to supine max of 2. Pts bed was put in sitting position. Pt progressing slowly. Continue goals. Progression toward goals:  [ ]      Improving appropriately and progressing toward goals  [X]      Improving slowly and progressing toward goals  [ ]      Not making progress toward goals and plan of care will be adjusted       PLAN:  Patient continues to benefit from skilled intervention to address the above impairments. Continue treatment per established plan of care. Discharge Recommendations:  In pt  Rehab vs [facility]  Further Equipment Recommendations for Discharge:  rolling walker       SUBJECTIVE:         OBJECTIVE DATA SUMMARY:   Critical Behavior:  Neurologic State: Alert  Orientation Level: Oriented X4  Cognition: Follows commands  Safety/Judgement: Fall prevention, Home safety, Awareness of environment  Functional Mobility Training:  Bed Mobility:  Rolling: Maximum assistance;Assist x2  Supine to Sit: Moderate assistance;Maximum assistance;Assist x2  Sit to Supine: Maximum assistance;Assist x2                       Transfers:  Sit to Stand: Moderate assistance;Maximum assistance;Assist x2  Stand to Sit: Moderate assistance;Assist x2                              Balance:  Sitting: High guard  Sitting - Static: Fair (occasional)  Sitting - Dynamic: Poor (constant support)  Standing: With support     Pain:  Pain Scale 1: Numeric (0 - 10)  Pain Intensity 1: 0              Activity Tolerance:   Pt tolerated treatment fairly well. Please refer to the flowsheet for vital signs taken during this treatment.   After treatment:   [ ] Patient left in no apparent distress sitting up in chair  [X] Patient left in no apparent distress in bed  [ ] Call bell left within reach  [ ] Nursing notified  [ ] Caregiver present  [ ] Bed alarm activated      COMMUNICATION/COLLABORATION:   The patients plan of care was discussed with: Physical Therapist     Eugenia Perez PTA   Time Calculation: 23 mins

## 2017-03-15 VITALS
SYSTOLIC BLOOD PRESSURE: 121 MMHG | WEIGHT: 195 LBS | DIASTOLIC BLOOD PRESSURE: 59 MMHG | HEART RATE: 81 BPM | BODY MASS INDEX: 33.29 KG/M2 | TEMPERATURE: 98.2 F | HEIGHT: 64 IN | RESPIRATION RATE: 18 BRPM | OXYGEN SATURATION: 94 %

## 2017-03-15 PROBLEM — R19.7 DIARRHEA: Status: RESOLVED | Noted: 2017-03-07 | Resolved: 2017-03-15

## 2017-03-15 PROBLEM — R11.2 NAUSEA & VOMITING: Status: RESOLVED | Noted: 2017-03-07 | Resolved: 2017-03-15

## 2017-03-15 PROBLEM — E87.1 HYPONATREMIA: Status: RESOLVED | Noted: 2017-03-07 | Resolved: 2017-03-15

## 2017-03-15 LAB
ANION GAP BLD CALC-SCNC: 4 MMOL/L (ref 5–15)
BUN SERPL-MCNC: 19 MG/DL (ref 6–20)
BUN/CREAT SERPL: 13 (ref 12–20)
CALCIUM SERPL-MCNC: 7.4 MG/DL (ref 8.5–10.1)
CHLORIDE SERPL-SCNC: 104 MMOL/L (ref 97–108)
CO2 SERPL-SCNC: 31 MMOL/L (ref 21–32)
CREAT SERPL-MCNC: 1.43 MG/DL (ref 0.55–1.02)
GLUCOSE SERPL-MCNC: 105 MG/DL (ref 65–100)
INR PPP: 1.5 (ref 0.9–1.1)
POTASSIUM SERPL-SCNC: 3.3 MMOL/L (ref 3.5–5.1)
PROTHROMBIN TIME: 14.8 SEC (ref 9–11.1)
SODIUM SERPL-SCNC: 139 MMOL/L (ref 136–145)

## 2017-03-15 PROCEDURE — 74011250637 HC RX REV CODE- 250/637: Performed by: INTERNAL MEDICINE

## 2017-03-15 PROCEDURE — 74011250636 HC RX REV CODE- 250/636: Performed by: INTERNAL MEDICINE

## 2017-03-15 PROCEDURE — 36415 COLL VENOUS BLD VENIPUNCTURE: CPT | Performed by: INTERNAL MEDICINE

## 2017-03-15 PROCEDURE — 74011636637 HC RX REV CODE- 636/637: Performed by: INTERNAL MEDICINE

## 2017-03-15 PROCEDURE — 74011000258 HC RX REV CODE- 258: Performed by: INTERNAL MEDICINE

## 2017-03-15 PROCEDURE — 85610 PROTHROMBIN TIME: CPT | Performed by: INTERNAL MEDICINE

## 2017-03-15 PROCEDURE — 80048 BASIC METABOLIC PNL TOTAL CA: CPT | Performed by: INTERNAL MEDICINE

## 2017-03-15 RX ORDER — AMLODIPINE BESYLATE 10 MG/1
10 TABLET ORAL DAILY
Qty: 30 TAB | Refills: 0 | Status: SHIPPED | OUTPATIENT
Start: 2017-03-15 | End: 2017-04-14

## 2017-03-15 RX ORDER — POTASSIUM CHLORIDE 750 MG/1
40 TABLET, FILM COATED, EXTENDED RELEASE ORAL ONCE
Status: COMPLETED | OUTPATIENT
Start: 2017-03-15 | End: 2017-03-15

## 2017-03-15 RX ORDER — WARFARIN SODIUM 5 MG/1
5 TABLET ORAL ONCE
Status: DISCONTINUED | OUTPATIENT
Start: 2017-03-15 | End: 2017-03-15 | Stop reason: HOSPADM

## 2017-03-15 RX ORDER — OXYCODONE HYDROCHLORIDE 5 MG/1
5 TABLET ORAL
Qty: 15 TAB | Refills: 0 | Status: SHIPPED | OUTPATIENT
Start: 2017-03-15

## 2017-03-15 RX ORDER — AMOXICILLIN 250 MG
1 CAPSULE ORAL DAILY
Qty: 30 TAB | Refills: 0 | Status: SHIPPED | OUTPATIENT
Start: 2017-03-15 | End: 2017-04-14

## 2017-03-15 RX ADMIN — PANTOPRAZOLE SODIUM 40 MG: 40 TABLET, DELAYED RELEASE ORAL at 06:40

## 2017-03-15 RX ADMIN — AMLODIPINE BESYLATE 10 MG: 5 TABLET ORAL at 05:00

## 2017-03-15 RX ADMIN — BUMETANIDE 0.5 MG: 1 TABLET ORAL at 09:11

## 2017-03-15 RX ADMIN — PIPERACILLIN SODIUM,TAZOBACTAM SODIUM 3.38 G: 3; .375 INJECTION, POWDER, FOR SOLUTION INTRAVENOUS at 01:01

## 2017-03-15 RX ADMIN — PREDNISONE 5 MG: 5 TABLET ORAL at 09:10

## 2017-03-15 RX ADMIN — PIPERACILLIN SODIUM,TAZOBACTAM SODIUM 3.38 G: 3; .375 INJECTION, POWDER, FOR SOLUTION INTRAVENOUS at 09:10

## 2017-03-15 RX ADMIN — POTASSIUM CHLORIDE 40 MEQ: 750 TABLET, FILM COATED, EXTENDED RELEASE ORAL at 09:10

## 2017-03-15 RX ADMIN — NEBIVOLOL HYDROCHLORIDE 5 MG: 2.5 TABLET ORAL at 09:11

## 2017-03-15 NOTE — PROGRESS NOTES
Pharmacist Discharge Medication Reconciliation    Discharge Provider:  Dr José Briggs       Discharge Medications:      Current Discharge Medication List        START taking these medications         Dose & Instructions Dispensing Information Comments   Morning Noon Evening Bedtime      oxyCODONE IR 5 mg immediate release tablet   Commonly known as:  Steven Gonsalves       Your last dose was: Your next dose is:              Dose:  5 mg   Take 1 Tab by mouth every six (6) hours as needed for up to 15 doses. Max Daily Amount: 20 mg.    Quantity:  15 Tab   Refills:  0                         senna-docusate 8.6-50 mg per tablet   Commonly known as:  SENNA WITH DOCUSATE SODIUM       Your last dose was: Your next dose is:              Dose:  1 Tab   Take 1 Tab by mouth daily for 30 days. Quantity:  30 Tab   Refills:  0                               CONTINUE these medications which have CHANGED         Dose & Instructions Dispensing Information Comments   Morning Noon Evening Bedtime      amLODIPine 10 mg tablet   Commonly known as:  NORVASC   What changed:    - medication strength  - how much to take       Your last dose was: Your next dose is:              Dose:  10 mg   Take 1 Tab by mouth daily for 30 days. Quantity:  30 Tab   Refills:  0                               CONTINUE these medications which have NOT CHANGED         Dose & Instructions Dispensing Information Comments   Morning Noon Evening Bedtime      albuterol-ipratropium 2.5 mg-0.5 mg/3 ml Nebu   Commonly known as:  DUO-NEB       Your last dose was: Your next dose is:              Dose:  3 mL   3 mL by Nebulization route every six (6) hours as needed. Quantity:  30 Nebule   Refills:  0                         BYSTOLIC 5 mg tablet   Generic drug:  nebivolol       Your last dose was: Your next dose is:              Dose:  5 mg   Take 5 mg by mouth daily.  Indications: hypertension    Refills:  0 ergocalciferol 50,000 unit capsule   Commonly known as:  ERGOCALCIFEROL       Your last dose was: Your next dose is:              Dose:  28070 Units   Take 50,000 Units by mouth every thirty (30) days. Refills:  0                         folic acid 1 mg tablet   Commonly known as:  FOLVITE       Your last dose was: Your next dose is:              Dose:  1 mg   Take 1 Tab by mouth daily. Quantity:  30 Tab   Refills:  0                         mirabegron ER 50 mg ER tablet   Commonly known as:  MYRBETRIQ       Your last dose was: Your next dose is:              Dose:  50 mg   Take 50 mg by mouth daily. Refills:  0                         predniSONE 5 mg tablet   Commonly known as:  DELTASONE       Your last dose was: Your next dose is:              Dose:  5 mg   Take 5 mg by mouth daily. Refills:  0                         PROTONIX 40 mg tablet   Generic drug:  pantoprazole       Your last dose was: Your next dose is:              Dose:  40 mg   Take 40 mg by mouth daily. Refills:  0                         * warfarin 2.5 mg tablet   Commonly known as:  COUMADIN       Your last dose was: Your next dose is:              Dose:  5 mg   Take 5 mg by mouth every Monday and Thursday. Refills:  0                         * warfarin 2.5 mg tablet   Commonly known as:  COUMADIN       Your last dose was: Your next dose is:              Dose:  2.5 mg   Take 2.5 mg by mouth five (5) days a week. Takes 2.5mg on Sunday, Tuesday, Wednesday, Friday, Saturday    Refills:  0                         * Notice: This list has 2 medication(s) that are the same as other medications prescribed for you. Read the directions carefully, and ask your doctor or other care provider to review them with you.           STOP taking these medications              DULCOLAX (BISACODYL) 5 mg EC tablet   Generic drug:  bisacodyl           REMICADE 100 mg injection   Generic drug: inFLIXimab                    Where to Get Your Medications        Information on where to get these meds will be given to you by the nurse or doctor. ! Ask your nurse or doctor about these medications     amLODIPine 10 mg tablet    oxyCODONE IR 5 mg immediate release tablet    senna-docusate 8.6-50 mg per tablet                  The patient's chart, MAR, and AVS were reviewed by   Liberty Parra.  Butch Plascencia [facility] 23: [phone]

## 2017-03-15 NOTE — PROGRESS NOTES
Interdisciplinary team rounds were held 3/15/2017 with the following team members:Care Management, Nursing, Physical Therapy and Clinical Coordinator and the primary RN.     Plan of care discussed.  See clinical pathway and/or care plan for interventions and desired outcomes.     Discharge today

## 2017-03-15 NOTE — PROGRESS NOTES
Rounded on [facility] patients and provided Anointing of the Sick at request of patient    Fr. Katelin Damon.

## 2017-03-15 NOTE — PROGRESS NOTES
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 · On Norvasc and Bystolic    Hypokalemia  · Repleted       Subjective:   CC: f/u ARF  HPI: Cr stable at 1.4. HTN is better. ROS: no n/v/sob  Current Facility-Administered Medications   Medication Dose Route Frequency    nebivolol (BYSTOLIC) tablet 5 mg  5 mg Oral DAILY    predniSONE (DELTASONE) tablet 5 mg  5 mg Oral DAILY    warfarin - pharmacist to dose   Other Rx Dosing/Monitoring    pantoprazole (PROTONIX) tablet 40 mg  40 mg Oral ACB    amLODIPine (NORVASC) tablet 10 mg  10 mg Oral DAILY    bumetanide (BUMEX) tablet 0.5 mg  0.5 mg Oral DAILY    albuterol-ipratropium (DUO-NEB) 2.5 MG-0.5 MG/3 ML  3 mL Nebulization Q4H PRN    HYDROmorphone (PF) (DILAUDID) injection 0.5 mg  0.5 mg IntraVENous Q4H PRN    oxyCODONE IR (ROXICODONE) tablet 5 mg  5 mg Oral Q6H PRN    naloxone (NARCAN) injection 0.4 mg  0.4 mg IntraVENous PRN    acetaminophen (TYLENOL) tablet 650 mg  650 mg Oral Q4H PRN    diphenhydrAMINE (BENADRYL) injection 12.5 mg  12.5 mg IntraVENous Q4H PRN    ondansetron (ZOFRAN) injection 4 mg  4 mg IntraVENous Q4H PRN    piperacillin-tazobactam (ZOSYN) 3.375 g in 0.9% sodium chloride (MBP/ADV) 100 mL  3.375 g IntraVENous Q8H          Objective:     Vitals:  Blood pressure 154/66, pulse 67, temperature 97.8 °F (36.6 °C), resp. rate 16, height 5' 4\" (1.626 m), weight 88.5 kg (195 lb), SpO2 95 %, not currently breastfeeding.   Temp (24hrs), Av.1 °F (36.7 °C), Min:97.8 °F (36.6 °C), Max:98.5 °F (36.9 °C)      Intake and Output:     1901 - 03/15 0700  In: -   Out: 271 [Urine:401; Drains:30]    Physical Exam:               GENERAL ASSESSMENT: NAD  CHEST: CTA  HEART: S1S2  ABDOMEN: Soft,NT  EXTREMITY: min EDEMA  NEURO:Grossly non focal ECG/rhythm:    Data Review      No results for input(s): TNIPOC in the last 72 hours. No lab exists for component: ITNL   No results for input(s): CPK, CKMB, TROIQ in the last 72 hours. Recent Labs      03/15/17   0418  03/14/17   0647  03/14/17   0413  03/13/17   0326   NA  139   --   144  144   K  3.3*   --   3.8  3.2*   CL  104   --   107  108   CO2  31   --   30  29   BUN  19   --   15  17   CREA  1.43*   --   1.19*  1.26*   GLU  105*   --   84  114*   CA  7.4*   --   7.4*  6.9*   WBC   --    --    --   11.4*   HGB   --   9.7*   --   8.6*   HCT   --   32.1*   --   29.6*   PLT   --    --    --   300      Recent Labs      03/15/17   0418  03/14/17   0413  03/13/17   1232  03/13/17   0326   INR  1.5*  1.3*  1.4*   --    PTP  14.8*  13.7*  13.8*   --    APTT   --    --    --   25.9     Needs: urine analysis, urine sodium, protein and creatinine  Lab Results   Component Value Date/Time    Sodium urine, random 32 03/08/2017 11:29 AM    Creatinine, urine 127.85 03/08/2017 11:29 AM           : Tomer Eubanks MD  3/15/2017        [facility]:  [email or web address]  [place]onda [place] office:  [address] [place] [address], [address] [place] [address] [address]  [place], [address] [place]  Phone: [phone]  Fax :     [phone]    [place] office:  [address] [facility], [address] [facility]  Phone - [phone]  Fax - [phone]

## 2017-03-15 NOTE — PROGRESS NOTES
3/15/17  CM placed call to [facility] on confirm that they are able to offer pt a bed today. CM sent d/c paperwork/ AVS. They are able to accept pt today. Nursing, please call report to [phone]. CM will speak with pt/family re transport and assist. Niki Sheets LCSW    3/15/17 CM spoke with pt and son re transport. CM will set up wheelchair [facility] [place] transport for 4pm today. [facility] notified. Niki Sheets.  Manuel Orta

## 2017-03-15 NOTE — DISCHARGE SUMMARY
Garcia Dudley tammy [address]  [address]  Tel: [phone]    Physician Discharge Summary    Patient ID:  Charles Leiva  Age: 80 y.o.    : 1933  MRN: [id number]     PCP: Bentley Schmidt MD     Admit date: 3/7/2017    Discharge date: 3/15/2017    Principal admission Diagnosis:   Acute cholecystitis    Discharge Diagnoses:  Principal Problem:    Acute cholecystitis (3/7/2017)    Esophageal reflux (2013)    ARF (acute renal failure) ([facility] (2013)    Rheumatoid arthritis ([facility] (2015)    Hx of deep venous thrombosis (2015)    CKD (chronic kidney disease) stage 3, GFR 30-59 ml/min (3/1/2017)    IPF (idiopathic pulmonary fibrosis) (HCC)     Hypertension     Arthritis     Hx pulmonary embolism     Weakness (3/7/2017)    Debilitated patient (3/7/2017)    Consults: Pulmonary/Intensive care, Nephrology and General Surgery    Hospital Course:     81 yo WF w/ hx of IPF, HTN, DVT, recent admission for bronchitis p/w abdominal pain, found to have acute cholecystitis      Acute cholecystitis with sepsis POA: She was seen by general surgery and had a lap guillermina 3/9/17 which showed a gangrenous gallbladder. Pathology shoed an edematous, necrotic gallbladder with cholelithiasis. She has done well post operatively. Had episodic diarrhea and C. Diff negative. Drains were removed and she has tolerated her diet well. She will be discharged to rehab for further management with a surgical follow up in 2 weeks.        ARF (acute renal failure) / Hyponatremia / CKD (chronic kidney disease) stage 3/ Hypokalemia: Cr stable and back to baseline.      Chronic respiratory failure with hypoxia / IPF (idiopathic pulmonary fibrosis): Noted on recent CT chest. Seen by Pulmonology and she is currently not on any oxygen. On low dose prednisone.       Rheumatoid arthritis: Continue prednisone. Resume Remicade once fully recovered.     Hx of deep venous thrombosis / Hx pulmonary embolism: On coumadin at home. Doppler extremity dopplers negative. Hgb stable. Continue Coumadin       Hypertension: BP has been stable. Continue Amlodipine     Weakness / Debilitated patient: severe. Continue PT/OT. Being discharged to rehab         Anemia, unspecified: with component of acute blood loss anemia: Hgb relatively stable.        Esophageal reflux: stable. Continue PPI    Discharge Exam:    Visit Vitals    /66 (BP 1 Location: Left arm, BP Patient Position: At rest)    Pulse 67    Temp 97.8 °F (36.6 °C)    Resp 16    Ht 5' 4\" (1.626 m)    Wt 88.5 kg (195 lb)    SpO2 95%    Breastfeeding No    BMI 33.47 kg/m2      General: well looking and stable patient in no acute distress  Pulm: clear breath sounds without wheezes  Card: no murmurs, normal S1, S2 without thrills, bruits   Abd:    soft, non-tender, normoactive bowel sounds  Skin: no rashes or ulcers, skin turgor is good  Neuro: awake, alert and has a non focal     Activity: Activity as tolerated    Diet: Regular Diet    Current Discharge Medication List      START taking these medications    Details   oxyCODONE IR (ROXICODONE) 5 mg immediate release tablet Take 1 Tab by mouth every six (6) hours as needed for up to 15 doses. Max Daily Amount: 20 mg.  Qty: 15 Tab, Refills: 0      senna-docusate (SENNA WITH DOCUSATE SODIUM) 8.6-50 mg per tablet Take 1 Tab by mouth daily for 30 days. Qty: 30 Tab, Refills: 0         CONTINUE these medications which have CHANGED    Details   amLODIPine (NORVASC) 10 mg tablet Take 1 Tab by mouth daily for 30 days. Qty: 30 Tab, Refills: 0         CONTINUE these medications which have NOT CHANGED    Details   warfarin (COUMADIN) 2.5 mg tablet Take 2.5 mg by mouth five (5) days a week. Takes 2.5mg on Sunday, Tuesday, Wednesday, Friday, Saturday      ergocalciferol (ERGOCALCIFEROL) 50,000 unit capsule Take 50,000 Units by mouth every thirty (30) days. mirabegron ER (MYRBETRIQ) 50 mg ER tablet Take 50 mg by mouth daily. warfarin (COUMADIN) 2.5 mg tablet Take 5 mg by mouth every Monday and Thursday. folic acid (FOLVITE) 1 mg tablet Take 1 Tab by mouth daily. Qty: 30 Tab, Refills: 0      pantoprazole (PROTONIX) 40 mg tablet Take 40 mg by mouth daily. nebivolol (BYSTOLIC) 5 mg tablet Take 5 mg by mouth daily. Indications: hypertension      predniSONE (DELTASONE) 5 mg tablet Take 5 mg by mouth daily. albuterol-ipratropium (DUO-NEB) 2.5 mg-0.5 mg/3 ml nebu 3 mL by Nebulization route every six (6) hours as needed. Qty: 30 Nebule, Refills: 0         STOP taking these medications       bisacodyl (DULCOLAX, BISACODYL,) 5 mg EC tablet Comments:   Reason for Stopping:         inFLIXimab (REMICADE) 100 mg injection Comments:   Reason for Stopping: Follow-up Information     Follow up With Details Comments Contact Info    Marlee Agustin MD  to schedule follow up after rehab [address] [facility]      Bisi Alvarado MD Call to confirm follow up in 2 weeks [address] [place] [address]  [phone]            Follow-up tests or labs: None    Discharge Condition: Stable    Disposition: Rehab    Time taken to arrange discharge:  35 minutes.     Signed:  Danna Chan MD     [facility]  3/15/2017   9:02 AM

## 2017-03-15 NOTE — PROGRESS NOTES
Bedside and Verbal shift change report given to Erum Varner RN (oncoming nurse) by Claudia Trinidad RN (offgoing nurse). Report included the following information SBAR, Kardex, Intake/Output, MAR, Accordion and Recent Results.

## 2017-03-15 NOTE — PROGRESS NOTES
[facility] Dosing Services: 3/15/17    Consult for Warfarin Dosing by Pharmacy by Dr. Tayo Garcia provided for this 80 y.o. female , for indication of DVT/PE history . PTA Dose: 5 mg on Monday and Thursday, 2.5 mg rest of week  Dose to achieve an INR goal of 2-3    Order entered for  Warfarin  5 (mg) ordered to be given today at 18:00. Significant drug interactions: Vit K 10 mg IVPB 3/8 (prior to surgery)  Previous dose given 5mg   PT/INR Lab Results   Component Value Date/Time    INR 1.5 03/15/2017 04:18 AM      Platelets Lab Results   Component Value Date/Time    PLATELET 968 14/88/4866 03:26 AM      H/H Lab Results   Component Value Date/Time    HGB 9.7 03/14/2017 06:47 AM        Pharmacy to follow daily and will provide subsequent Warfarin dosing based on clinical status. SAMANTHA Reyes  [facility] [address] [id number] [phone]

## 2017-03-15 NOTE — DISCHARGE INSTRUCTIONS
HOSPITALIST DISCHARGE INSTRUCTIONS    NAME: Amira Aguilar   :  1933   MRN:  [id number]     Date:     3/15/2017    ADMIT DATE: 3/7/2017     DISCHARGE DATE: 3/15/2017     PRINCIPAL ADMITTING DIAGNOSIS:  Acute cholecystitis  gallstones    DISCHARGE DIAGNOSES:  Principal Problem:    Acute cholecystitis (3/7/2017)    Esophageal reflux (2013)    ARF (acute renal failure) ([facility] [address] (2013)    Rheumatoid arthritis ([facility] [address] (2015)    Hx of deep venous thrombosis (2015)    CKD (chronic kidney disease) stage 3, GFR 30-59 ml/min (3/1/2017)    IPF (idiopathic pulmonary fibrosis) ([facility]) ()    Hypertension     Arthritis     Hx pulmonary embolism     Weakness (3/7/2017)    Debilitated patient (3/7/2017)    MEDICATIONS:    · It is important that medications are taken exactly as they are prescribed on the discharge medication instructions and keep them your  in the bottles provided by the pharmacist.   · Keep a list of the medication names, dosages, and times to be taken at all times. · Do not take other medications without consulting your doctor.    · Monitor INR weekly start 3/17    Recommended diet:  Regular Diet    Recommended activity: Activity as tolerated    Post discharge care:    Notify follow up health care provider or return to the emergency department if you cannot get hold of your doctor if you feel worse or experience symptoms similar to those that brought you to hospital    Follow-up Information     Follow up With Details Comments Aishwarya Yarbrough MD  to schedule follow up after rehab [address] [place] [address]  [phone]      Laurel Wheeler MD Call to confirm follow up in 2 weeks [address] [place] [address]  [phone]            Information obtained by :  I understand that if any problems occur once I am at home I am to contact my physician and I understand and acknowledge receipt of the instructions indicated above.                                                                                                                                            Physician's or R.N.'s Signature                                                                  Date/Time                                                                                                                                              Patient or Representative Signature                                                          Date/Time

## 2017-03-15 NOTE — PROGRESS NOTES
1:43 PM Called [facility] [place], gave report to [facility] [place]. Using SBAR, provided opportunity for questions and number for unit if any questions arise. 2:05 PM I have reviewed discharge instructions with the patient. The patient verbalized understanding. Gave opportunity for questions. Removed peripheral IVs. Awaiting medical transport. 3:48 PM Bedside and Verbal shift change report given to Wil Reyes RN (oncoming nurse) by Mery Ivey RN (offgoing nurse). Report included the following information SBAR and Kardex.

## 2017-05-01 NOTE — CONSULTS
Surgery Consult    Subjective:      Ramesh Briggs is a 80 y.o. female who presented to the ED with c/o abdominal pain. She had been admitted 3/1-3/3 for CAP. Per her son, whom she lives with, patient developed generalized weakness, N/V, and RUQ abdominal pain. ED workup showed evidence of acute cholecystitis on CT and US. Son states that, in retrospect, patient had has some post-prandial discomfort that they have been attributing to GERD. Pt has hx of DVT/PE and is on Coumadin. She also has hx of idiopathic pulmonary fibrosis and is followed by Dr. Violet Gaona. She is not on home O2. Past Medical History:   Diagnosis Date    Acid reflux     Arthritis     Hx pulmonary embolism     Hypertension     IPF (idiopathic pulmonary fibrosis) (HCC)      Past Surgical History:   Procedure Laterality Date    HX HYSTERECTOMY      HX KNEE REPLACEMENT      right    HX LUMBAR FUSION      HX TONSILLECTOMY        History reviewed. No pertinent family history.   Social History     Social History    Marital status:      Spouse name: N/A    Number of children: N/A    Years of education: N/A     Social History Main Topics    Smoking status: Never Smoker    Smokeless tobacco: Never Used    Alcohol use No    Drug use: No    Sexual activity: Not Asked     Other Topics Concern    None     Social History Narrative      Current Facility-Administered Medications   Medication Dose Route Frequency    HYDROmorphone (PF) (DILAUDID) injection 1 mg  1 mg IntraVENous Q4H PRN    hydrocortisone Sod Succ (PF) (SOLU-CORTEF) injection 100 mg  100 mg IntraVENous Q6H    pantoprazole (PROTONIX) 40 mg in sodium chloride 0.9 % 10 mL injection  40 mg IntraVENous Q12H    naloxone (NARCAN) injection 0.4 mg  0.4 mg IntraVENous PRN    0.9% sodium chloride infusion  100 mL/hr IntraVENous CONTINUOUS    acetaminophen (TYLENOL) tablet 650 mg  650 mg Oral Q4H PRN    diphenhydrAMINE (BENADRYL) injection 12.5 mg  12.5 mg IntraVENous Q4H PRN  ondansetron (ZOFRAN) injection 4 mg  4 mg IntraVENous Q4H PRN    piperacillin-tazobactam (ZOSYN) 3.375 g in 0.9% sodium chloride (MBP/ADV) 100 mL  3.375 g IntraVENous Q8H    albuterol-ipratropium (DUO-NEB) 2.5 MG-0.5 MG/3 ML  3 mL Nebulization Q6H PRN      Allergies   Allergen Reactions    Ciprofloxacin Other (comments)     \"Makes her act weird\" per son       Review of Systems:REVIEW OF SYSTEMS:     []     Unable to obtain  ROS due to  []    mental status change  []    sedated   []    intubated   []    Total of 12 systems reviewed as follows:    Constitutional: neg for fevers, chills, + generalized weakness  Eyes: negative for blurry vision  Ears, nose, mouth, throat, and face: negative for sore throat  Respiratory: + for SOB  Cardiovascular: negative for CP  Gastrointestinal: positive for nausea, vomiting and abdominal pain  Genitourinary: negative for dysuria  Integument/breast: neg for skin rash  Hematologic/lymphatic: neg for bruising  Musculoskeletal: negative for muscle aches  Neurological: no dizziness or h/a    Objective:      Patient Vitals for the past 8 hrs:   BP Temp Pulse Resp SpO2   17 1030 108/50 - 67 15 96 %   17 1000 (!) 125/39 - 71 13 96 %   17 0930 125/60 - 69 11 96 %   17 0900 (!) 119/99 - 69 18 97 %   17 0830 120/68 - 69 13 96 %   17 0800 139/59 - 68 16 97 %   17 0730 128/70 - 72 13 99 %   17 0710 - - 69 - -   17 0700 141/53 97.9 °F (36.6 °C) 67 12 96 %   17 0600 121/79 - 69 12 96 %   17 0500 136/55 - 87 29 97 %   17 0400 146/59 98.2 °F (36.8 °C) 87 19 97 %       Temp (24hrs), Av.1 °F (36.7 °C), Min:97.8 °F (36.6 °C), Max:98.5 °F (36.9 °C)      Physical Exam:  General:  Alert, cooperative, no distress, appears stated age. Eyes:  Conjunctivae/corneas clear. Nose: Nares normal. Septum midline. Mouth/Throat: Lips, mucosa, and tongue normal.   Neck: Supple, symmetrical, trachea midline.    Lungs:   Mild dyspnea, crackles and wheezing bilaterallly   Heart:  Regular rate and rhythm   Abdomen:   Soft, RUQ tenderness   Extremities: Extremities normal, atraumatic, no cyanosis or edema. Skin: Skin color, texture, turgor normal. No rashes or lesions   Neuro: Alert, oriented, speech clear     Labs: Recent Labs      03/08/17   0529   WBC  31.0*   HGB  10.8*   HCT  34.2*   PLT  336     Recent Labs      03/08/17   0529   NA  138   K  3.6   CL  104   CO2  19*   GLU  140*   BUN  42*   CREA  2.18*   CA  7.9*   MG  2.5*   PHOS  4.8*   ALB  2.1*   TBILI  0.8   SGOT  28   ALT  18     Recent Labs      03/08/17   0529   INR  2.2*         Assessment and Plan:     Acute cholecystitis    Discussed option of lap guillermina vs cholecystostomy placement with patient and her son. Pulmonary to evaluate for pre-op risk assessment. Will plan to go ahead with IR cholecystostomy today. Will need coumadin reversal prior to procedure. Keep NPO. Patient evaluated with Dr. Talavera Labor and plan discussed. Thank you for the consultation.      Signed By: SOHAN Flores     March 8, 2017 Left LE

## 2017-06-11 NOTE — PERIOP NOTES
TRANSFER - OUT REPORT:    Verbal report given to yolanda Greenberg on Freddy Wright  being transferred to [facility] for routine post - op       Report consisted of patients Situation, Background, Assessment and   Recommendations(SBAR). Information from the following report(s) SBAR, Kardex, OR Summary, Procedure Summary, Intake/Output and MAR was reviewed with the receiving nurse. Lines:   Peripheral IV 03/07/17 Left Wrist (Active)   Site Assessment Clean, dry, & intact 3/9/2017  1:44 PM   Phlebitis Assessment 0 3/9/2017  1:44 PM   Infiltration Assessment 0 3/9/2017  1:44 PM   Dressing Status Clean, dry, & intact; Occlusive 3/9/2017  1:44 PM   Dressing Type Tape;Transparent 3/9/2017  1:44 PM   Hub Color/Line Status Pink; Infusing 3/9/2017  1:44 PM   Action Taken Open ports on tubing capped 3/9/2017  7:35 AM   Alcohol Cap Used Yes 3/9/2017  7:35 AM       Peripheral IV 03/09/17 Right Wrist (Active)   Site Assessment Intact 3/9/2017 12:27 PM   Phlebitis Assessment 0 3/9/2017 12:27 PM   Infiltration Assessment 0 3/9/2017 12:27 PM   Dressing Status Clean, dry, & intact; Occlusive 3/9/2017 12:27 PM   Dressing Type Tape;Transparent 3/9/2017 12:27 PM   Hub Color/Line Status Pink; Infusing 3/9/2017 12:27 PM   Action Taken Open ports on tubing capped 3/9/2017  7:49 AM   Alcohol Cap Used Yes 3/9/2017  7:49 AM        Opportunity for questions and clarification was provided.       Patient transported with:   O2 @ 2 liters  Registered Nurse ear pain

## (undated) DEVICE — SURGICAL PROCEDURE KIT GEN LAPAROSCOPY LF

## (undated) DEVICE — STERILE POLYISOPRENE POWDER-FREE SURGICAL GLOVES: Brand: PROTEXIS

## (undated) DEVICE — UNIVERSAL STAPLER: Brand: ENDO GIA ULTRA

## (undated) DEVICE — Device

## (undated) DEVICE — KENDALL SCD EXPRESS SLEEVES, KNEE LENGTH, MEDIUM: Brand: KENDALL SCD

## (undated) DEVICE — NEEDLE HYPO 22GA L1.5IN BLK S STL HUB POLYPR SHLD REG BVL

## (undated) DEVICE — TOWEL SURG W17XL27IN STD BLU COT NONFENESTRATED PREWASHED

## (undated) DEVICE — SPECIMEN RETRIEVAL POUCH: Brand: ENDO CATCH GOLD

## (undated) DEVICE — BLADELESS OPTICAL TROCAR WITH FIXATION CANNULA: Brand: VERSAONE

## (undated) DEVICE — DERMABOND SKIN ADH 0.7ML -- DERMABOND ADVANCED 12/BX

## (undated) DEVICE — CLICKLINE SCISSORS INSERT: Brand: CLICKLINE

## (undated) DEVICE — 3M™ TEGADERM™ TRANSPARENT FILM DRESSING FRAME STYLE, 1628, 6 IN X 8 IN (15 CM X 20 CM), 10/CT 8CT/CASE: Brand: 3M™ TEGADERM™

## (undated) DEVICE — REM POLYHESIVE ADULT PATIENT RETURN ELECTRODE: Brand: VALLEYLAB

## (undated) DEVICE — SOL IRRIGATION INJ NACL 0.9% 500ML BTL

## (undated) DEVICE — SUTURE MCRYL SZ 4-0 L27IN ABSRB UD L19MM PS-2 1/2 CIR PRIM Y426H

## (undated) DEVICE — TUBING FLTR PLUME AWAY EVAC W/ SUCT DEV DISP PUREVIEW

## (undated) DEVICE — DRAIN SURG WND 15 FR RND TIP SIL STRL LF DISP

## (undated) DEVICE — SUT ETHLN 2-0 18IN FS BLK --

## (undated) DEVICE — APPLIER CLP M/L SHFT DIA5MM 15 LIG LIGAMAX 5

## (undated) DEVICE — UNIVERSAL FIXATION CANNULA: Brand: VERSAONE

## (undated) DEVICE — KIT INFECTION CTRL ST FRAN --

## (undated) DEVICE — TUBING INSUFLTN 10FT LUER -- CONVERT TO ITEM 368568

## (undated) DEVICE — INSUFFLATION NEEDLE: Brand: SURGINEEDLE

## (undated) DEVICE — AGENT HEMSTAT 5GM ARISTA AH

## (undated) DEVICE — E-Z CLEAN, PTFE COATED, ELECTROSURGICAL LAPAROSCOPIC ELECTRODE, L-HOOK, 33 CM., SINGLE-USE, FOR USE WITH HAND CONTROL PENCIL: Brand: MEGADYNE

## (undated) DEVICE — COVER LT HNDL BLU PLAS

## (undated) DEVICE — ARTICULATION RELOAD WITH TRI-STAPLE TECHNOLOGY: Brand: ENDO GIA

## (undated) DEVICE — DISPOSABLE GRASPER CARTRIDGE: Brand: DIRECT DRIVE REPOSABLE GRASPERS

## (undated) DEVICE — BLADELESS OPTICAL TROCAR WITH FIXATION CANNULA: Brand: VERSAPORT

## (undated) DEVICE — DEVICE TRNSF SPIK STL 2008S] MICROTEK MEDICAL INC]

## (undated) DEVICE — (D)PREP SKN CHLRAPRP APPL 26ML -- CONVERT TO ITEM 371833

## (undated) DEVICE — SUTURE SZ 0 27IN 5/8 CIR UR-6  TAPER PT VIOLET ABSRB VICRYL J603H

## (undated) DEVICE — DRAPE,REIN 53X77,STERILE: Brand: MEDLINE